# Patient Record
Sex: MALE | Race: WHITE | NOT HISPANIC OR LATINO | Employment: OTHER | URBAN - METROPOLITAN AREA
[De-identification: names, ages, dates, MRNs, and addresses within clinical notes are randomized per-mention and may not be internally consistent; named-entity substitution may affect disease eponyms.]

---

## 2023-04-03 LAB
ALANINE AMINOTRANSFERASE (SGPT) (U/L) IN SER/PLAS: 22 U/L (ref 10–52)
ALBUMIN (G/DL) IN SER/PLAS: 3.8 G/DL (ref 3.4–5)
ALKALINE PHOSPHATASE (U/L) IN SER/PLAS: 84 U/L (ref 33–136)
ANION GAP IN SER/PLAS: 12 MMOL/L (ref 10–20)
ASPARTATE AMINOTRANSFERASE (SGOT) (U/L) IN SER/PLAS: 21 U/L (ref 9–39)
BILIRUBIN TOTAL (MG/DL) IN SER/PLAS: 0.6 MG/DL (ref 0–1.2)
CALCIUM (MG/DL) IN SER/PLAS: 9 MG/DL (ref 8.6–10.3)
CARBON DIOXIDE, TOTAL (MMOL/L) IN SER/PLAS: 29 MMOL/L (ref 21–32)
CHLORIDE (MMOL/L) IN SER/PLAS: 102 MMOL/L (ref 98–107)
CHOLESTEROL (MG/DL) IN SER/PLAS: 99 MG/DL (ref 0–199)
CHOLESTEROL IN HDL (MG/DL) IN SER/PLAS: 40.3 MG/DL
CHOLESTEROL/HDL RATIO: 2.5
CREATININE (MG/DL) IN SER/PLAS: 1.6 MG/DL (ref 0.5–1.3)
ESTIMATED AVERAGE GLUCOSE FOR HBA1C: 194 MG/DL
GFR MALE: 41 ML/MIN/1.73M2
GLUCOSE (MG/DL) IN SER/PLAS: 93 MG/DL (ref 74–99)
HEMOGLOBIN A1C/HEMOGLOBIN TOTAL IN BLOOD: 8.4 %
LDL: 46 MG/DL (ref 0–99)
POTASSIUM (MMOL/L) IN SER/PLAS: 4.1 MMOL/L (ref 3.5–5.3)
PROTEIN TOTAL: 6.7 G/DL (ref 6.4–8.2)
SODIUM (MMOL/L) IN SER/PLAS: 139 MMOL/L (ref 136–145)
THYROTROPIN (MIU/L) IN SER/PLAS BY DETECTION LIMIT <= 0.05 MIU/L: 4.88 MIU/L (ref 0.44–3.98)
THYROXINE (T4) FREE (NG/DL) IN SER/PLAS: 1.33 NG/DL (ref 0.61–1.12)
TRIGLYCERIDE (MG/DL) IN SER/PLAS: 66 MG/DL (ref 0–149)
URATE (MG/DL) IN SER/PLAS: 7.9 MG/DL (ref 4–7.5)
UREA NITROGEN (MG/DL) IN SER/PLAS: 24 MG/DL (ref 6–23)
VLDL: 13 MG/DL (ref 0–40)

## 2023-04-19 ENCOUNTER — OFFICE VISIT (OUTPATIENT)
Dept: PRIMARY CARE | Facility: CLINIC | Age: 88
End: 2023-04-19
Payer: MEDICARE

## 2023-04-19 VITALS
BODY MASS INDEX: 27.79 KG/M2 | WEIGHT: 188.2 LBS | DIASTOLIC BLOOD PRESSURE: 64 MMHG | HEART RATE: 66 BPM | TEMPERATURE: 96.4 F | SYSTOLIC BLOOD PRESSURE: 144 MMHG | OXYGEN SATURATION: 97 %

## 2023-04-19 DIAGNOSIS — E11.9 TYPE 2 DIABETES MELLITUS WITHOUT COMPLICATION, WITHOUT LONG-TERM CURRENT USE OF INSULIN (MULTI): ICD-10-CM

## 2023-04-19 DIAGNOSIS — Z00.00 MEDICARE ANNUAL WELLNESS VISIT, SUBSEQUENT: Primary | ICD-10-CM

## 2023-04-19 DIAGNOSIS — Z13.39 ALCOHOL SCREENING: ICD-10-CM

## 2023-04-19 DIAGNOSIS — I48.11 LONGSTANDING PERSISTENT ATRIAL FIBRILLATION (MULTI): ICD-10-CM

## 2023-04-19 DIAGNOSIS — E03.9 ACQUIRED HYPOTHYROIDISM: ICD-10-CM

## 2023-04-19 DIAGNOSIS — Z13.31 DEPRESSION SCREEN: ICD-10-CM

## 2023-04-19 PROCEDURE — G0444 DEPRESSION SCREEN ANNUAL: HCPCS | Performed by: INTERNAL MEDICINE

## 2023-04-19 PROCEDURE — 1159F MED LIST DOCD IN RCRD: CPT | Performed by: INTERNAL MEDICINE

## 2023-04-19 PROCEDURE — 1170F FXNL STATUS ASSESSED: CPT | Performed by: INTERNAL MEDICINE

## 2023-04-19 PROCEDURE — G0439 PPPS, SUBSEQ VISIT: HCPCS | Performed by: INTERNAL MEDICINE

## 2023-04-19 PROCEDURE — G0442 ANNUAL ALCOHOL SCREEN 15 MIN: HCPCS | Performed by: INTERNAL MEDICINE

## 2023-04-19 PROCEDURE — 99213 OFFICE O/P EST LOW 20 MIN: CPT | Performed by: INTERNAL MEDICINE

## 2023-04-19 RX ORDER — GLIPIZIDE 5 MG/1
TABLET ORAL
COMMUNITY
Start: 2023-03-04

## 2023-04-19 RX ORDER — IBUPROFEN 200 MG
CAPSULE ORAL 2 TIMES DAILY
COMMUNITY
Start: 2016-06-01 | End: 2023-04-19 | Stop reason: SDUPTHER

## 2023-04-19 RX ORDER — WARFARIN 2.5 MG/1
1 TABLET ORAL DAILY
COMMUNITY
Start: 2015-08-27 | End: 2024-02-27 | Stop reason: SDUPTHER

## 2023-04-19 RX ORDER — ATORVASTATIN CALCIUM 10 MG/1
1 TABLET, FILM COATED ORAL NIGHTLY
COMMUNITY
Start: 2018-02-05 | End: 2024-02-27 | Stop reason: SDUPTHER

## 2023-04-19 RX ORDER — PANTOPRAZOLE SODIUM 40 MG/1
1 TABLET, DELAYED RELEASE ORAL DAILY
COMMUNITY
Start: 2022-09-26 | End: 2024-02-27 | Stop reason: SDUPTHER

## 2023-04-19 RX ORDER — KETOCONAZOLE 20 MG/ML
SHAMPOO, SUSPENSION TOPICAL WEEKLY
COMMUNITY

## 2023-04-19 RX ORDER — LEVOTHYROXINE SODIUM 100 UG/1
1 TABLET ORAL DAILY
COMMUNITY
Start: 2015-11-18 | End: 2023-09-08 | Stop reason: SDUPTHER

## 2023-04-19 RX ORDER — FERROUS SULFATE 325(65) MG
1 TABLET ORAL 2 TIMES DAILY
COMMUNITY
Start: 2018-02-05

## 2023-04-19 RX ORDER — PEN NEEDLE, DIABETIC 31 GX5/16"
NEEDLE, DISPOSABLE MISCELLANEOUS
COMMUNITY
Start: 2022-10-12 | End: 2023-10-18 | Stop reason: SDUPTHER

## 2023-04-19 RX ORDER — TORSEMIDE 10 MG/1
1 TABLET ORAL DAILY
COMMUNITY
Start: 2018-02-05 | End: 2024-02-27 | Stop reason: SDUPTHER

## 2023-04-19 RX ORDER — INSULIN DEGLUDEC 100 U/ML
INJECTION, SOLUTION SUBCUTANEOUS
COMMUNITY
Start: 2020-12-02 | End: 2023-04-19 | Stop reason: SDUPTHER

## 2023-04-19 RX ORDER — INSULIN DEGLUDEC 100 U/ML
8 INJECTION, SOLUTION SUBCUTANEOUS EVERY MORNING
Qty: 3 ML | Refills: 3 | Status: SHIPPED | OUTPATIENT
Start: 2023-04-19 | End: 2023-10-18 | Stop reason: SDUPTHER

## 2023-04-19 RX ORDER — IBUPROFEN 200 MG
1 CAPSULE ORAL 2 TIMES DAILY
Qty: 200 STRIP | Refills: 3 | Status: SHIPPED | OUTPATIENT
Start: 2023-04-19 | End: 2023-10-18 | Stop reason: SDUPTHER

## 2023-04-19 ASSESSMENT — ACTIVITIES OF DAILY LIVING (ADL)
DRESSING: INDEPENDENT
TAKING_MEDICATION: INDEPENDENT
GROCERY_SHOPPING: INDEPENDENT
DOING_HOUSEWORK: INDEPENDENT
BATHING: INDEPENDENT
MANAGING_FINANCES: INDEPENDENT

## 2023-04-19 ASSESSMENT — PATIENT HEALTH QUESTIONNAIRE - PHQ9
2. FEELING DOWN, DEPRESSED OR HOPELESS: NOT AT ALL
1. LITTLE INTEREST OR PLEASURE IN DOING THINGS: NOT AT ALL
SUM OF ALL RESPONSES TO PHQ9 QUESTIONS 1 AND 2: 0

## 2023-04-19 NOTE — PATIENT INSTRUCTIONS
Plan:   Medicare wellness done   Recent lab results were discussed with patient. Questions related to it answered. Patient felt satisfied with it.  Current medications are effective. advised to continue current medications.  F/U with cardiologist as advised   F/U 6 months or as needed

## 2023-04-19 NOTE — PROGRESS NOTES
My nurse note reviewed. Patient is here for:  Here for medicare wellness and follow up    Hx of a fib,thyroid, DM    Hx of L knee surgery 8 yrs ago. Uses cane    Patient is independent with ADLs. Patient does drive.     No recent falls.    During Medicare wellness apart from looking at assessment done by nurse,  I also asked following :    Alcohol use : Alcohol screening  was  done during this visit. Patient is not having any issue with increase alcohol use . No ETOH abuse was observed by history .occ wine drinking   HIV test: patient was not found to be high risk for HIV     Cognitive issue : None     Advanced Directive: Patient has advanced directive including living will and Health care power of . Health POA is son      I have reviewed all active medications patient is currently on . Questions related to medication answered to patient's satisfaction.    Patient denies any shortness of breath, PND, orthopnea, chest pain , palpitation, syncope or edema in legs  patient denies any abdominal pain, tenderness, nausea, vomiting, change in bowel habits or blood in stool.  Patient denies any weakness in extremities.. Denies any headache, visual symptoms , speech problems or  tremors . No TIA or stroke like symptoms..    Taking meds ok     OBJECTIVE :  Vitals noted   Not in acute distress  Conj Pink, No icterus  Neck:No Cervical LN enlargement, No Thyroid enlargement No carotid bruit  Lungs: good air entry bilaterally, no rales or rhonchi  CVS: S1 S2 + ,irr irr, rate controlled  no S3. Sys heart murmur heard.   Abdomen: Soft, non tender , BS +. no organomegaly , no CVA tenderness  MSK: No spine tenderness or muscle tenderness noted on gross examination  CNS: Pt is alert, moving all 4 extremities. no motor weakness or abnormal movements noted on gross examination.  Extremities: No edema, No calf tenderness, Francisco's sign negative.      Assessment:  1. Medicare annual wellness visit, subsequent - done   2. Alcohol  screening    3. Depression screen    4. Type 2 diabetes mellitus without complication, without long-term current use of insulin (CMS/[facility])- sees Dr Rushing    5. Longstanding persistent atrial fibrillation (CMS/[facility]) -rate controlled   6. Acquired hypothyroidism -stable on med   7 sys murmur- had echo done. Reviewed.       Plan:   Medicare wellness done  Alcohol screen was  done during this visit. Patient is not having any issue with increase alcohol use . No ETOH abuse was observed by history .  Occ wine drinking    Recent lab results were discussed with patient. Questions related to it answered. Patient felt satisfied with it.  Current medications are effective. advised to continue current medications.  F/U with cardiologist as advised   F/U 6 months or as needed

## 2023-08-24 LAB
ALANINE AMINOTRANSFERASE (SGPT) (U/L) IN SER/PLAS: 29 U/L (ref 10–52)
ALBUMIN (G/DL) IN SER/PLAS: 4 G/DL (ref 3.4–5)
ALBUMIN (MG/L) IN URINE: 21.8 MG/L
ALBUMIN/CREATININE (UG/MG) IN URINE: 66.5 UG/MG CRT (ref 0–30)
ALKALINE PHOSPHATASE (U/L) IN SER/PLAS: 89 U/L (ref 33–136)
ANION GAP IN SER/PLAS: 12 MMOL/L (ref 10–20)
ASPARTATE AMINOTRANSFERASE (SGOT) (U/L) IN SER/PLAS: 29 U/L (ref 9–39)
BASOPHILS (10*3/UL) IN BLOOD BY AUTOMATED COUNT: 0.03 X10E9/L (ref 0–0.1)
BASOPHILS/100 LEUKOCYTES IN BLOOD BY AUTOMATED COUNT: 0.5 % (ref 0–2)
BILIRUBIN TOTAL (MG/DL) IN SER/PLAS: 0.7 MG/DL (ref 0–1.2)
CALCIUM (MG/DL) IN SER/PLAS: 8.7 MG/DL (ref 8.6–10.3)
CARBON DIOXIDE, TOTAL (MMOL/L) IN SER/PLAS: 26 MMOL/L (ref 21–32)
CHLORIDE (MMOL/L) IN SER/PLAS: 103 MMOL/L (ref 98–107)
CREATININE (MG/DL) IN SER/PLAS: 1.44 MG/DL (ref 0.5–1.3)
CREATININE (MG/DL) IN URINE: 32.8 MG/DL (ref 20–370)
EOSINOPHILS (10*3/UL) IN BLOOD BY AUTOMATED COUNT: 0.19 X10E9/L (ref 0–0.4)
EOSINOPHILS/100 LEUKOCYTES IN BLOOD BY AUTOMATED COUNT: 3.4 % (ref 0–6)
ERYTHROCYTE DISTRIBUTION WIDTH (RATIO) BY AUTOMATED COUNT: 14.6 % (ref 11.5–14.5)
ERYTHROCYTE MEAN CORPUSCULAR HEMOGLOBIN CONCENTRATION (G/DL) BY AUTOMATED: 31 G/DL (ref 32–36)
ERYTHROCYTE MEAN CORPUSCULAR VOLUME (FL) BY AUTOMATED COUNT: 98 FL (ref 80–100)
ERYTHROCYTES (10*6/UL) IN BLOOD BY AUTOMATED COUNT: 3.45 X10E12/L (ref 4.5–5.9)
ESTIMATED AVERAGE GLUCOSE FOR HBA1C: 186 MG/DL
GFR MALE: 46 ML/MIN/1.73M2
GLUCOSE (MG/DL) IN SER/PLAS: 201 MG/DL (ref 74–99)
HEMATOCRIT (%) IN BLOOD BY AUTOMATED COUNT: 33.9 % (ref 41–52)
HEMOGLOBIN (G/DL) IN BLOOD: 10.5 G/DL (ref 13.5–17.5)
HEMOGLOBIN A1C/HEMOGLOBIN TOTAL IN BLOOD: 8.1 %
IMMATURE GRANULOCYTES/100 LEUKOCYTES IN BLOOD BY AUTOMATED COUNT: 0.4 % (ref 0–0.9)
LEUKOCYTES (10*3/UL) IN BLOOD BY AUTOMATED COUNT: 5.6 X10E9/L (ref 4.4–11.3)
LYMPHOCYTES (10*3/UL) IN BLOOD BY AUTOMATED COUNT: 0.82 X10E9/L (ref 0.8–3)
LYMPHOCYTES/100 LEUKOCYTES IN BLOOD BY AUTOMATED COUNT: 14.6 % (ref 13–44)
MONOCYTES (10*3/UL) IN BLOOD BY AUTOMATED COUNT: 0.52 X10E9/L (ref 0.05–0.8)
MONOCYTES/100 LEUKOCYTES IN BLOOD BY AUTOMATED COUNT: 9.2 % (ref 2–10)
NEUTROPHILS (10*3/UL) IN BLOOD BY AUTOMATED COUNT: 4.05 X10E9/L (ref 1.6–5.5)
NEUTROPHILS/100 LEUKOCYTES IN BLOOD BY AUTOMATED COUNT: 71.9 % (ref 40–80)
NRBC (PER 100 WBCS) BY AUTOMATED COUNT: 0 /100 WBC (ref 0–0)
PARATHYRIN INTACT (PG/ML) IN SER/PLAS: 107.6 PG/ML (ref 18.5–88)
PHOSPHATE (MG/DL) IN SER/PLAS: 3.4 MG/DL (ref 2.5–4.9)
PLATELETS (10*3/UL) IN BLOOD AUTOMATED COUNT: 171 X10E9/L (ref 150–450)
POTASSIUM (MMOL/L) IN SER/PLAS: 4.9 MMOL/L (ref 3.5–5.3)
PROTEIN TOTAL: 6.8 G/DL (ref 6.4–8.2)
SODIUM (MMOL/L) IN SER/PLAS: 136 MMOL/L (ref 136–145)
THYROTROPIN (MIU/L) IN SER/PLAS BY DETECTION LIMIT <= 0.05 MIU/L: 3.86 MIU/L (ref 0.44–3.98)
UREA NITROGEN (MG/DL) IN SER/PLAS: 24 MG/DL (ref 6–23)

## 2023-09-08 DIAGNOSIS — E03.9 ACQUIRED HYPOTHYROIDISM: Primary | ICD-10-CM

## 2023-09-08 RX ORDER — LEVOTHYROXINE SODIUM 100 UG/1
100 TABLET ORAL DAILY
Qty: 90 TABLET | Refills: 3 | Status: SHIPPED | OUTPATIENT
Start: 2023-09-08 | End: 2024-04-22 | Stop reason: SDUPTHER

## 2023-10-12 ENCOUNTER — ANTICOAGULATION - WARFARIN VISIT (OUTPATIENT)
Dept: PHARMACY | Facility: CLINIC | Age: 88
End: 2023-10-12
Payer: MEDICARE

## 2023-10-12 DIAGNOSIS — I48.91 ATRIAL FIBRILLATION, UNSPECIFIED TYPE (MULTI): Primary | ICD-10-CM

## 2023-10-12 PROBLEM — K80.20 ASYMPTOMATIC CHOLELITHIASIS: Status: ACTIVE | Noted: 2023-10-12

## 2023-10-12 PROBLEM — M25.551 RIGHT HIP PAIN: Status: ACTIVE | Noted: 2023-10-12

## 2023-10-12 PROBLEM — M79.10 MUSCULAR PAIN: Status: ACTIVE | Noted: 2023-10-12

## 2023-10-12 PROBLEM — E03.9 ACQUIRED HYPOTHYROIDISM: Status: ACTIVE | Noted: 2023-10-12

## 2023-10-12 PROBLEM — E66.9 OBESITY, CLASS I, BMI 30.0-34.9 (SEE ACTUAL BMI): Status: ACTIVE | Noted: 2023-10-12

## 2023-10-12 PROBLEM — R10.9 ABDOMINAL PAIN: Status: ACTIVE | Noted: 2023-10-12

## 2023-10-12 PROBLEM — K44.9 HH (HIATUS HERNIA): Status: ACTIVE | Noted: 2023-10-12

## 2023-10-12 PROBLEM — K25.9 GASTRIC ULCER: Status: ACTIVE | Noted: 2023-10-12

## 2023-10-12 PROBLEM — Z86.19 PERSONAL HISTORY OF HELICOBACTER INFECTION: Status: ACTIVE | Noted: 2023-10-12

## 2023-10-12 PROBLEM — Z96.659 PRESENCE OF ARTIFICIAL KNEE JOINT: Status: ACTIVE | Noted: 2023-10-12

## 2023-10-12 PROBLEM — K22.70 BARRETT'S ESOPHAGUS: Status: ACTIVE | Noted: 2023-10-12

## 2023-10-12 PROBLEM — R93.89 ABNORMAL CHEST XRAY: Status: ACTIVE | Noted: 2023-10-12

## 2023-10-12 PROBLEM — D50.9 IRON DEFICIENCY ANEMIA: Status: ACTIVE | Noted: 2023-10-12

## 2023-10-12 PROBLEM — R14.0 ABDOMINAL BLOATING: Status: ACTIVE | Noted: 2023-10-12

## 2023-10-12 PROBLEM — J18.9 PNEUMONIA: Status: ACTIVE | Noted: 2023-10-12

## 2023-10-12 PROBLEM — D36.9 TUBULAR ADENOMA: Status: ACTIVE | Noted: 2023-10-12

## 2023-10-12 PROBLEM — Z86.010 HISTORY OF COLON POLYPS: Status: ACTIVE | Noted: 2023-10-12

## 2023-10-12 PROBLEM — Z86.0100 HISTORY OF COLON POLYPS: Status: ACTIVE | Noted: 2023-10-12

## 2023-10-12 PROBLEM — E78.5 HYPERLIPIDEMIA: Status: ACTIVE | Noted: 2023-10-12

## 2023-10-12 PROBLEM — A04.8 HELICOBACTER PYLORI (H. PYLORI) INFECTION: Status: ACTIVE | Noted: 2023-10-12

## 2023-10-12 PROBLEM — I35.0 MODERATE AORTIC STENOSIS: Status: ACTIVE | Noted: 2023-10-12

## 2023-10-12 PROBLEM — M25.50 LEG JOINT PAIN: Status: ACTIVE | Noted: 2023-10-12

## 2023-10-12 PROBLEM — E11.9 DIABETES MELLITUS (MULTI): Status: ACTIVE | Noted: 2023-10-12

## 2023-10-12 PROBLEM — E66.811 OBESITY, CLASS I, BMI 30.0-34.9 (SEE ACTUAL BMI): Status: ACTIVE | Noted: 2023-10-12

## 2023-10-12 PROBLEM — K58.9 IRRITABLE BOWEL SYNDROME: Status: ACTIVE | Noted: 2023-10-12

## 2023-10-12 LAB
POC INR: 2.2
POC PROTHROMBIN TIME: NORMAL

## 2023-10-12 PROCEDURE — 99212 OFFICE O/P EST SF 10 MIN: CPT | Performed by: PHARMACIST

## 2023-10-12 PROCEDURE — 85610 PROTHROMBIN TIME: CPT | Performed by: PHARMACIST

## 2023-10-12 RX ORDER — GLYBURIDE 5 MG/1
2 TABLET ORAL 2 TIMES DAILY
COMMUNITY
Start: 2015-06-26

## 2023-10-12 RX ORDER — PSYLLIUM HUSK 3.4 G/5.8G
1 POWDER ORAL 2 TIMES DAILY
COMMUNITY
Start: 2020-10-05

## 2023-10-12 RX ORDER — SIMETHICONE 125 MG
125 CAPSULE ORAL EVERY 12 HOURS PRN
COMMUNITY
Start: 2020-10-05

## 2023-10-12 RX ORDER — PIOGLITAZONEHYDROCHLORIDE 45 MG/1
45 TABLET ORAL EVERY MORNING
COMMUNITY

## 2023-10-12 RX ORDER — BRIMONIDINE TARTRATE, TIMOLOL MALEATE 2; 5 MG/ML; MG/ML
1 SOLUTION/ DROPS OPHTHALMIC EVERY 12 HOURS
COMMUNITY

## 2023-10-12 RX ORDER — METRONIDAZOLE 7.5 MG/G
LOTION TOPICAL 2 TIMES DAILY
COMMUNITY

## 2023-10-12 RX ORDER — OXYMETAZOLINE HYDROCHLORIDE 0.05 G/100ML
2 SPRAY NASAL EVERY 12 HOURS PRN
COMMUNITY
Start: 2022-06-06

## 2023-10-12 NOTE — PROGRESS NOTES
Coumadin Clinic Visit Note    Patient verified warfarin dose  No missed doses  No unusual bruising or bleeding  No changes to medications  Consistent dietary green intake  No anticipated procedures at this time  INR Therapeutic today at 2.2  No changes to warfarin dose today  Next appointment 5 weeks      Paolo MaresD

## 2023-10-18 ENCOUNTER — OFFICE VISIT (OUTPATIENT)
Dept: PRIMARY CARE | Facility: CLINIC | Age: 88
End: 2023-10-18
Payer: MEDICARE

## 2023-10-18 VITALS
WEIGHT: 183.6 LBS | BODY MASS INDEX: 27.11 KG/M2 | HEART RATE: 49 BPM | DIASTOLIC BLOOD PRESSURE: 80 MMHG | SYSTOLIC BLOOD PRESSURE: 124 MMHG | TEMPERATURE: 98 F | OXYGEN SATURATION: 98 %

## 2023-10-18 DIAGNOSIS — N18.4 STAGE 4 CHRONIC KIDNEY DISEASE (MULTI): ICD-10-CM

## 2023-10-18 DIAGNOSIS — E03.9 ACQUIRED HYPOTHYROIDISM: ICD-10-CM

## 2023-10-18 DIAGNOSIS — E11.9 TYPE 2 DIABETES MELLITUS WITHOUT COMPLICATION, WITHOUT LONG-TERM CURRENT USE OF INSULIN (MULTI): Primary | ICD-10-CM

## 2023-10-18 PROCEDURE — 1159F MED LIST DOCD IN RCRD: CPT | Performed by: INTERNAL MEDICINE

## 2023-10-18 PROCEDURE — 99214 OFFICE O/P EST MOD 30 MIN: CPT | Performed by: INTERNAL MEDICINE

## 2023-10-18 PROCEDURE — 1126F AMNT PAIN NOTED NONE PRSNT: CPT | Performed by: INTERNAL MEDICINE

## 2023-10-18 RX ORDER — INSULIN DEGLUDEC 100 U/ML
8 INJECTION, SOLUTION SUBCUTANEOUS EVERY MORNING
Qty: 3 ML | Refills: 3 | Status: SHIPPED | OUTPATIENT
Start: 2023-10-18

## 2023-10-18 RX ORDER — IBUPROFEN 200 MG
1 CAPSULE ORAL 2 TIMES DAILY
Qty: 200 STRIP | Refills: 3 | Status: SHIPPED | OUTPATIENT
Start: 2023-10-18

## 2023-10-18 RX ORDER — PEN NEEDLE, DIABETIC 30 GX3/16"
NEEDLE, DISPOSABLE MISCELLANEOUS
Qty: 100 EACH | Refills: 3 | Status: SHIPPED | OUTPATIENT
Start: 2023-10-18

## 2023-10-18 RX ORDER — INSULIN DEGLUDEC 100 U/ML
8 INJECTION, SOLUTION SUBCUTANEOUS EVERY MORNING
Qty: 3 ML | Refills: 3 | Status: SHIPPED | OUTPATIENT
Start: 2023-10-18 | End: 2023-10-18 | Stop reason: SDUPTHER

## 2023-10-18 RX ORDER — IBUPROFEN 200 MG
1 CAPSULE ORAL 2 TIMES DAILY
Qty: 200 STRIP | Refills: 3 | Status: SHIPPED | OUTPATIENT
Start: 2023-10-18 | End: 2023-10-18 | Stop reason: SDUPTHER

## 2023-10-18 RX ORDER — PEN NEEDLE, DIABETIC 31 GX5/16"
NEEDLE, DISPOSABLE MISCELLANEOUS
Qty: 100 EACH | Refills: 3 | Status: SHIPPED | OUTPATIENT
Start: 2023-10-18 | End: 2023-10-18 | Stop reason: SDUPTHER

## 2023-10-18 ASSESSMENT — PATIENT HEALTH QUESTIONNAIRE - PHQ9
SUM OF ALL RESPONSES TO PHQ9 QUESTIONS 1 AND 2: 0
1. LITTLE INTEREST OR PLEASURE IN DOING THINGS: NOT AT ALL
2. FEELING DOWN, DEPRESSED OR HOPELESS: NOT AT ALL

## 2023-10-18 NOTE — PROGRESS NOTES
My nurse note reviewed. Patient is here for:  Follow-up (6 mo follow up/Needs refills on needles, tresiba, and test strips - would like them printed out)       Here for f/U on DM , thyrod, a fib    Patient denies any shortness of breath, PND, orthopnea, chest pain , palpitation, syncope or edema in legs  Taking meds ok  Accuchecks 110-170 range most of the time , check it twice daily   Non smoker for 65 yrs   Gets echo done by cardiologist, results from 2/2023 reviewed  Lives alone  Patient is independent with ADLs. Patient does drive.   walks with cane's assistance.   No recent falls.    OBJECTIVE :  /80   Pulse (!) 49   Temp 36.7 °C (98 °F) (Temporal)   Wt 83.3 kg (183 lb 9.6 oz)   SpO2 98%   BMI 27.11 kg/m²   Repeat bp is ok  CVS: S1 S2 + , no S3. Sys heart murmur appreciated. Lungs clear, No edema  [place]   Alert, oriented     Assessment:  1. Type 2 diabetes mellitus without complication, without long-term current use of insulin (CMS/[facility])  Doing well. Continue med. refill      2. Stage 4 chronic kidney disease (CMS/HCC)  Hx of.       3. Acquired hypothyroidism  Hx of. On med.         Plan  Current medications are effective. advised to continue current medications.  Monitor sugar at home   Requested prescription/s refill done to the pharmacy of patient choice .  Declines flu shot   F/U in April as scheduled

## 2023-10-18 NOTE — PATIENT INSTRUCTIONS
Plan  Current medications are effective. advised to continue current medications.  Monitor sugar at home   Requested prescription/s refill done to the pharmacy of patient choice .  Declines flu shot   F/U in April as scheduled

## 2023-11-16 ENCOUNTER — ANTICOAGULATION - WARFARIN VISIT (OUTPATIENT)
Dept: PHARMACY | Facility: CLINIC | Age: 88
End: 2023-11-16
Payer: MEDICARE

## 2023-11-16 DIAGNOSIS — I48.91 ATRIAL FIBRILLATION, UNSPECIFIED TYPE (MULTI): Primary | ICD-10-CM

## 2023-11-16 LAB
POC INR: 2.3
POC PROTHROMBIN TIME: NORMAL

## 2023-11-16 PROCEDURE — 99212 OFFICE O/P EST SF 10 MIN: CPT | Performed by: PHARMACIST

## 2023-11-16 PROCEDURE — 85610 PROTHROMBIN TIME: CPT | Performed by: PHARMACIST

## 2023-11-16 NOTE — PROGRESS NOTES
Verified current dose with pt.    No new meds or med changes since last visit.  Pt denies unusual bleed/bruise.  No upcoming procedures.  Inr = 2.3  Continue same dose and check again in 5 weeks.    Pt had skin cancer removed and held 1 & 1/2 doses without letting us know.  He will have another skin cancer removal on his chest area and he does not need to hold warfarin again.

## 2023-12-13 ENCOUNTER — LAB (OUTPATIENT)
Dept: LAB | Facility: LAB | Age: 88
End: 2023-12-13
Payer: MEDICARE

## 2023-12-13 DIAGNOSIS — E11.9 TYPE 2 DIABETES MELLITUS WITHOUT COMPLICATIONS (MULTI): ICD-10-CM

## 2023-12-13 DIAGNOSIS — E78.5 HYPERLIPIDEMIA, UNSPECIFIED: Primary | ICD-10-CM

## 2023-12-13 LAB
ALBUMIN SERPL BCP-MCNC: 4 G/DL (ref 3.4–5)
ALP SERPL-CCNC: 89 U/L (ref 33–136)
ALT SERPL W P-5'-P-CCNC: 26 U/L (ref 10–52)
ANION GAP SERPL CALC-SCNC: 11 MMOL/L (ref 10–20)
AST SERPL W P-5'-P-CCNC: 25 U/L (ref 9–39)
BILIRUB SERPL-MCNC: 0.6 MG/DL (ref 0–1.2)
BUN SERPL-MCNC: 28 MG/DL (ref 6–23)
CALCIUM SERPL-MCNC: 8.7 MG/DL (ref 8.6–10.3)
CHLORIDE SERPL-SCNC: 104 MMOL/L (ref 98–107)
CO2 SERPL-SCNC: 29 MMOL/L (ref 21–32)
CREAT SERPL-MCNC: 1.55 MG/DL (ref 0.5–1.3)
GFR SERPL CREATININE-BSD FRML MDRD: 42 ML/MIN/1.73M*2
GLUCOSE SERPL-MCNC: 184 MG/DL (ref 74–99)
POTASSIUM SERPL-SCNC: 4.6 MMOL/L (ref 3.5–5.3)
PROT SERPL-MCNC: 6.7 G/DL (ref 6.4–8.2)
SODIUM SERPL-SCNC: 139 MMOL/L (ref 136–145)

## 2023-12-13 PROCEDURE — 36415 COLL VENOUS BLD VENIPUNCTURE: CPT

## 2023-12-13 PROCEDURE — 80053 COMPREHEN METABOLIC PANEL: CPT

## 2023-12-13 PROCEDURE — 83036 HEMOGLOBIN GLYCOSYLATED A1C: CPT

## 2023-12-14 LAB
EST. AVERAGE GLUCOSE BLD GHB EST-MCNC: 186 MG/DL
HBA1C MFR BLD: 8.1 %

## 2023-12-21 ENCOUNTER — ANTICOAGULATION - WARFARIN VISIT (OUTPATIENT)
Dept: PHARMACY | Facility: CLINIC | Age: 88
End: 2023-12-21
Payer: MEDICARE

## 2023-12-21 DIAGNOSIS — I48.91 ATRIAL FIBRILLATION, UNSPECIFIED TYPE (MULTI): Primary | ICD-10-CM

## 2023-12-21 LAB
POC INR: 2.4
POC PROTHROMBIN TIME: NORMAL

## 2023-12-21 PROCEDURE — 85610 PROTHROMBIN TIME: CPT

## 2023-12-21 PROCEDURE — 99212 OFFICE O/P EST SF 10 MIN: CPT

## 2024-01-25 ENCOUNTER — CLINICAL SUPPORT (OUTPATIENT)
Dept: PHARMACY | Facility: CLINIC | Age: 89
End: 2024-01-25
Payer: MEDICARE

## 2024-01-25 ENCOUNTER — ANTICOAGULATION - WARFARIN VISIT (OUTPATIENT)
Dept: PHARMACY | Facility: CLINIC | Age: 89
End: 2024-01-25
Payer: MEDICARE

## 2024-01-25 DIAGNOSIS — I48.91 ATRIAL FIBRILLATION, UNSPECIFIED TYPE (MULTI): Primary | ICD-10-CM

## 2024-01-25 LAB
POC INR: 2.8
POC PROTHROMBIN TIME: NORMAL

## 2024-01-25 PROCEDURE — 85610 PROTHROMBIN TIME: CPT | Mod: QW | Performed by: PHARMACIST

## 2024-01-25 PROCEDURE — 99212 OFFICE O/P EST SF 10 MIN: CPT | Performed by: PHARMACIST

## 2024-01-25 NOTE — PROGRESS NOTES
Saul Asif is a 89 y.o. male with history of A fib who presents today for anticoagulation monitoring and adjustment.  INR 2.8 is therapeutic for this patient (goal range 2-3) and is reflective of 11.25 mg TWD  Patient verifies current dosing regimen, patient able to verbally recall dose  Patient reports no  missed doses since last INR  Last INR 2.4 on 12/21/24 (5 week interval)  Patient denies s/sx clotting and/or stroke  Patient denies hematuria, epistaxis, rectal bleeding  Patient denies changes in diet, alcohol, or tobacco use  Vegetable intake consistent from week to week  Reviewed medication list and drug allergies with patient, updated any medication additions or modifications accordingly  Acetaminophen intake: no changes   Patient also denies any pending medical or dental procedures scheduled at this time  Patient was instructed to continue current TWD 11.25mg and RTC 5 weeks    Annie Merritt, PharmD, BCPS   1/25/2024 9:22 AM

## 2024-02-02 ENCOUNTER — TELEPHONE (OUTPATIENT)
Dept: PRIMARY CARE | Facility: CLINIC | Age: 89
End: 2024-02-02
Payer: MEDICARE

## 2024-02-06 DIAGNOSIS — N18.4 STAGE 4 CHRONIC KIDNEY DISEASE (MULTI): ICD-10-CM

## 2024-02-06 DIAGNOSIS — R26.9 GAIT DISORDER: Primary | ICD-10-CM

## 2024-02-15 DIAGNOSIS — I48.0 PAROXYSMAL ATRIAL FIBRILLATION (MULTI): Primary | ICD-10-CM

## 2024-02-22 PROBLEM — C61 PROSTATE CANCER (MULTI): Status: ACTIVE | Noted: 2017-11-09

## 2024-02-22 PROBLEM — C44.329 SQUAMOUS CELL CANCER OF SKIN OF LEFT CHEEK: Status: ACTIVE | Noted: 2017-11-13

## 2024-02-27 ENCOUNTER — OFFICE VISIT (OUTPATIENT)
Dept: CARDIOLOGY | Facility: CLINIC | Age: 89
End: 2024-02-27
Payer: MEDICARE

## 2024-02-27 ENCOUNTER — HOSPITAL ENCOUNTER (OUTPATIENT)
Dept: CARDIOLOGY | Facility: CLINIC | Age: 89
Discharge: HOME | End: 2024-02-27
Payer: MEDICARE

## 2024-02-27 VITALS
BODY MASS INDEX: 25.77 KG/M2 | WEIGHT: 174 LBS | SYSTOLIC BLOOD PRESSURE: 112 MMHG | OXYGEN SATURATION: 97 % | DIASTOLIC BLOOD PRESSURE: 72 MMHG | HEART RATE: 57 BPM | HEIGHT: 69 IN

## 2024-02-27 VITALS
DIASTOLIC BLOOD PRESSURE: 80 MMHG | HEIGHT: 69 IN | BODY MASS INDEX: 26.96 KG/M2 | WEIGHT: 182 LBS | SYSTOLIC BLOOD PRESSURE: 124 MMHG

## 2024-02-27 DIAGNOSIS — E78.5 HYPERLIPIDEMIA, UNSPECIFIED HYPERLIPIDEMIA TYPE: ICD-10-CM

## 2024-02-27 DIAGNOSIS — I35.0 SEVERE AORTIC STENOSIS: Primary | ICD-10-CM

## 2024-02-27 DIAGNOSIS — K80.20 ASYMPTOMATIC CHOLELITHIASIS: ICD-10-CM

## 2024-02-27 DIAGNOSIS — I48.0 PAROXYSMAL ATRIAL FIBRILLATION (MULTI): ICD-10-CM

## 2024-02-27 DIAGNOSIS — I48.91 ATRIAL FIBRILLATION, UNSPECIFIED TYPE (MULTI): ICD-10-CM

## 2024-02-27 DIAGNOSIS — I06.2 RHEUMATIC AORTIC STENOSIS WITH INSUFFICIENCY: ICD-10-CM

## 2024-02-27 DIAGNOSIS — I35.0 NONRHEUMATIC AORTIC (VALVE) STENOSIS: ICD-10-CM

## 2024-02-27 DIAGNOSIS — Z96.659 ARTIFICIAL KNEE JOINT PRESENT, UNSPECIFIED LATERALITY: ICD-10-CM

## 2024-02-27 DIAGNOSIS — C61 PROSTATE CANCER (MULTI): ICD-10-CM

## 2024-02-27 DIAGNOSIS — I48.91 UNSPECIFIED ATRIAL FIBRILLATION (MULTI): ICD-10-CM

## 2024-02-27 LAB
AORTIC VALVE MEAN GRADIENT: 60.3 MMHG
AORTIC VALVE PEAK VELOCITY: 5.43 M/S
AV PEAK GRADIENT: 118 MMHG
AVA (PEAK VEL): 0.67 CM2
AVA (VTI): 0.7 CM2
BODY SURFACE AREA: 1.96 M2
EJECTION FRACTION APICAL 4 CHAMBER: 55.9
EJECTION FRACTION: 46 %
LEFT ATRIUM VOLUME AREA LENGTH INDEX BSA: 76.6 ML/M2
LEFT VENTRICLE INTERNAL DIMENSION DIASTOLE: 4.56 CM (ref 3.5–6)
LEFT VENTRICULAR OUTFLOW TRACT DIAMETER: 2.34 CM
RIGHT VENTRICLE FREE WALL PEAK S': 1 CM/S
RIGHT VENTRICLE PEAK SYSTOLIC PRESSURE: 40.2 MMHG
TRICUSPID ANNULAR PLANE SYSTOLIC EXCURSION: 2.1 CM

## 2024-02-27 PROCEDURE — 1159F MED LIST DOCD IN RCRD: CPT | Performed by: INTERNAL MEDICINE

## 2024-02-27 PROCEDURE — 93306 TTE W/DOPPLER COMPLETE: CPT

## 2024-02-27 PROCEDURE — 99215 OFFICE O/P EST HI 40 MIN: CPT | Performed by: INTERNAL MEDICINE

## 2024-02-27 PROCEDURE — 93000 ELECTROCARDIOGRAM COMPLETE: CPT | Performed by: INTERNAL MEDICINE

## 2024-02-27 PROCEDURE — 1126F AMNT PAIN NOTED NONE PRSNT: CPT | Performed by: INTERNAL MEDICINE

## 2024-02-27 PROCEDURE — 1160F RVW MEDS BY RX/DR IN RCRD: CPT | Performed by: INTERNAL MEDICINE

## 2024-02-27 PROCEDURE — 1036F TOBACCO NON-USER: CPT | Performed by: INTERNAL MEDICINE

## 2024-02-27 PROCEDURE — 93306 TTE W/DOPPLER COMPLETE: CPT | Performed by: INTERNAL MEDICINE

## 2024-02-27 RX ORDER — ATORVASTATIN CALCIUM 10 MG/1
10 TABLET, FILM COATED ORAL NIGHTLY
Qty: 90 TABLET | Refills: 3 | Status: SHIPPED | OUTPATIENT
Start: 2024-02-27 | End: 2024-04-23 | Stop reason: SDUPTHER

## 2024-02-27 RX ORDER — WARFARIN 2.5 MG/1
2.5 TABLET ORAL NIGHTLY
Qty: 90 TABLET | Refills: 3 | Status: SHIPPED | OUTPATIENT
Start: 2024-02-27 | End: 2024-04-23 | Stop reason: SDUPTHER

## 2024-02-27 RX ORDER — PANTOPRAZOLE SODIUM 40 MG/1
40 TABLET, DELAYED RELEASE ORAL DAILY
Qty: 90 TABLET | Refills: 3 | Status: SHIPPED | OUTPATIENT
Start: 2024-02-27 | End: 2024-04-15 | Stop reason: SDUPTHER

## 2024-02-27 RX ORDER — TORSEMIDE 10 MG/1
10 TABLET ORAL DAILY
Qty: 90 TABLET | Refills: 3 | Status: SHIPPED | OUTPATIENT
Start: 2024-02-27

## 2024-02-27 NOTE — PROGRESS NOTES
"  Subjective  Saul Asif  is a 89 y.o. year old male who presents for aortic stenosis and atrial fibrillation.  No dyspnea, no chest pain, no edema. No palapitions.  He notes loss of balance butno syncope.  He notes a \"delay from his head to his leg\" but no lightheadedness  Blood pressure 112/72, pulse 57, height 1.753 m (5' 9\"), weight 78.9 kg (174 lb), SpO2 97 %.   Patient has no known allergies.  History reviewed. No pertinent past medical history.  History reviewed. No pertinent surgical history.  Family History   Problem Relation Name Age of Onset    No Known Problems Mother      No Known Problems Father       @SOC    Current Outpatient Medications   Medication Sig Dispense Refill    atorvastatin (Lipitor) 10 mg tablet Take 1 tablet (10 mg) by mouth once daily at bedtime. 90 tablet 3    blood sugar diagnostic (Blood Glucose Test) strip 1 strip by abdominal subcutaneous route 2 times a day. twice a day. 200 strip 3    Combigan 0.2-0.5 % ophthalmic solution Administer 1 drop into both eyes every 12 hours.      ferrous sulfate 325 (65 Fe) MG tablet Take 1 tablet (325 mg) by mouth 2 times a day.      glipiZIDE (Glucotrol) 5 mg tablet       glyBURIDE (Diabeta) 5 mg tablet Take 2 tablets (10 mg) by mouth 2 times a day. In the morning and in the evening      insulin degludec (Tresiba FlexTouch U-100) 100 unit/mL (3 mL) injection Inject 8 Units under the skin once daily in the morning. 3 mL 3    ketoconazole (NIZOral) 2 % shampoo Apply topically per week.      levothyroxine (Synthroid, Levoxyl) 100 mcg tablet Take 1 tablet (100 mcg) by mouth once daily. 90 tablet 3    metroNIDAZOLE (Metrolotion) 0.75 % lotion lotion Apply topically twice a day. to affected area      oxymetazoline (Afrin, oxymetazoline,) 0.05 % nasal spray Administer 2 sprays into each nostril every 12 hours if needed (nose bleed).      pantoprazole (ProtoNix) 40 mg EC tablet Take 1 tablet (40 mg) by mouth once daily. 90 tablet 3    pen needle, " "diabetic (BD Ultra-Fine Mini Pen Needle) 31 gauge x 3/16\" needle TEST BLOOD SUGAR TWICE A  each 3    pioglitazone (Actos) 45 mg tablet Take 1 tablet (45 mg) by mouth once daily in the morning.      psyllium husk, aspartame, (Metamucil Sugar-Free, aspart,) 3.4 gram/5.8 gram powder Take 1 packet by mouth twice a day.      simethicone (Gas-X Extra Strength) 125 mg capsule Take 1 capsule (125 mg) by mouth every 12 hours if needed (abd cramps and bloating).      SITagliptin phosphate (Januvia) 50 mg tablet Take 1 tablet (50 mg) by mouth once daily in the evening.      torsemide (Demadex) 10 mg tablet Take 1 tablet (10 mg) by mouth once daily. 90 tablet 3    warfarin (Coumadin) 2.5 mg tablet Take 1 tablet (2.5 mg) by mouth once daily at bedtime. 90 tablet 3     No current facility-administered medications for this visit.        ROS  Review of Systems   All other systems reviewed and are negative.      Physical Exam  Physical Exam  Constitutional:       Appearance: Normal appearance.   HENT:      Head: Normocephalic and atraumatic.   Eyes:      Extraocular Movements: Extraocular movements intact.      Pupils: Pupils are equal, round, and reactive to light.   Cardiovascular:      Rate and Rhythm: Rhythm irregularly irregular.      Heart sounds: Murmur heard.      Decrescendo systolic murmur is present with a grade of 4/6.   Musculoskeletal:      Right lower leg: No edema.      Left lower leg: No edema.   Skin:     General: Skin is warm and dry.   Neurological:      General: No focal deficit present.      Mental Status: He is alert and oriented to person, place, and time.   Psychiatric:         Mood and Affect: Mood normal.         Behavior: Behavior normal.          EKG  Encounter Date: 02/27/24   ECG 12 Lead    Narrative    Atrial fibrillation at 57/min., RBBB       Problem List Items Addressed This Visit       Asymptomatic cholelithiasis    Relevant Medications    pantoprazole (ProtoNix) 40 mg EC tablet    Atrial " fibrillation (CMS/HCC)    Relevant Medications    torsemide (Demadex) 10 mg tablet    warfarin (Coumadin) 2.5 mg tablet    Other Relevant Orders    Follow Up In Cardiology    ECG 12 Lead (Completed)    Transthoracic Echo (TTE) Complete    Hyperlipidemia    Relevant Medications    atorvastatin (Lipitor) 10 mg tablet    Severe aortic stenosis - Primary     2/27/24 echocardiogram LVEF =45-50&, Severe aoritiuc stenosis, moderate aortic insufficiency, modrate MR, TR with mild to moderately increased RVSP (Reviewed today)         Relevant Orders    Transthoracic Echo (TTE) Complete    Follow Up In Cardiology    Presence of artificial knee joint    Paroxysmal atrial fibrillation (CMS/HCC)    Relevant Orders    Follow Up In Cardiology    Prostate cancer (CMS/HCC)         Same meds with repeat echocardiogram 6 months      Matthew Tubbs MD

## 2024-02-28 ENCOUNTER — LAB (OUTPATIENT)
Dept: LAB | Facility: LAB | Age: 89
End: 2024-02-28
Payer: MEDICARE

## 2024-02-28 DIAGNOSIS — N25.81 SECONDARY HYPERPARATHYROIDISM OF RENAL ORIGIN (MULTI): ICD-10-CM

## 2024-02-28 DIAGNOSIS — N18.30 CHRONIC KIDNEY DISEASE, STAGE 3 UNSPECIFIED (MULTI): Primary | ICD-10-CM

## 2024-02-28 LAB
25(OH)D3 SERPL-MCNC: 33 NG/ML (ref 30–100)
ALBUMIN SERPL BCP-MCNC: 3.9 G/DL (ref 3.4–5)
ANION GAP SERPL CALC-SCNC: 12 MMOL/L (ref 10–20)
BASOPHILS # BLD AUTO: 0.05 X10*3/UL (ref 0–0.1)
BASOPHILS NFR BLD AUTO: 0.9 %
BUN SERPL-MCNC: 29 MG/DL (ref 6–23)
CALCIUM SERPL-MCNC: 8.7 MG/DL (ref 8.6–10.3)
CHLORIDE SERPL-SCNC: 104 MMOL/L (ref 98–107)
CO2 SERPL-SCNC: 28 MMOL/L (ref 21–32)
CREAT SERPL-MCNC: 1.64 MG/DL (ref 0.5–1.3)
CREAT UR-MCNC: 100.1 MG/DL (ref 20–370)
EGFRCR SERPLBLD CKD-EPI 2021: 39 ML/MIN/1.73M*2
EOSINOPHIL # BLD AUTO: 0.08 X10*3/UL (ref 0–0.4)
EOSINOPHIL NFR BLD AUTO: 1.5 %
ERYTHROCYTE [DISTWIDTH] IN BLOOD BY AUTOMATED COUNT: 13.6 % (ref 11.5–14.5)
GLUCOSE SERPL-MCNC: 182 MG/DL (ref 74–99)
HCT VFR BLD AUTO: 35.1 % (ref 41–52)
HGB BLD-MCNC: 11.3 G/DL (ref 13.5–17.5)
IMM GRANULOCYTES # BLD AUTO: 0.01 X10*3/UL (ref 0–0.5)
IMM GRANULOCYTES NFR BLD AUTO: 0.2 % (ref 0–0.9)
LYMPHOCYTES # BLD AUTO: 0.99 X10*3/UL (ref 0.8–3)
LYMPHOCYTES NFR BLD AUTO: 18.5 %
MCH RBC QN AUTO: 31.6 PG (ref 26–34)
MCHC RBC AUTO-ENTMCNC: 32.2 G/DL (ref 32–36)
MCV RBC AUTO: 98 FL (ref 80–100)
MICROALBUMIN UR-MCNC: 11 MG/L
MICROALBUMIN/CREAT UR: 11 UG/MG CREAT
MONOCYTES # BLD AUTO: 0.53 X10*3/UL (ref 0.05–0.8)
MONOCYTES NFR BLD AUTO: 9.9 %
NEUTROPHILS # BLD AUTO: 3.7 X10*3/UL (ref 1.6–5.5)
NEUTROPHILS NFR BLD AUTO: 69 %
NRBC BLD-RTO: 0 /100 WBCS (ref 0–0)
PHOSPHATE SERPL-MCNC: 3.2 MG/DL (ref 2.5–4.9)
PLATELET # BLD AUTO: 155 X10*3/UL (ref 150–450)
POTASSIUM SERPL-SCNC: 4.5 MMOL/L (ref 3.5–5.3)
RBC # BLD AUTO: 3.58 X10*6/UL (ref 4.5–5.9)
SODIUM SERPL-SCNC: 139 MMOL/L (ref 136–145)
WBC # BLD AUTO: 5.4 X10*3/UL (ref 4.4–11.3)

## 2024-02-28 PROCEDURE — 83970 ASSAY OF PARATHORMONE: CPT

## 2024-02-28 PROCEDURE — 85025 COMPLETE CBC W/AUTO DIFF WBC: CPT

## 2024-02-28 PROCEDURE — 82570 ASSAY OF URINE CREATININE: CPT

## 2024-02-28 PROCEDURE — 36415 COLL VENOUS BLD VENIPUNCTURE: CPT

## 2024-02-28 PROCEDURE — 80069 RENAL FUNCTION PANEL: CPT

## 2024-02-28 PROCEDURE — 82043 UR ALBUMIN QUANTITATIVE: CPT

## 2024-02-28 PROCEDURE — 82306 VITAMIN D 25 HYDROXY: CPT

## 2024-02-29 ENCOUNTER — ANTICOAGULATION - WARFARIN VISIT (OUTPATIENT)
Dept: PHARMACY | Facility: CLINIC | Age: 89
End: 2024-02-29
Payer: MEDICARE

## 2024-02-29 DIAGNOSIS — I48.0 PAROXYSMAL ATRIAL FIBRILLATION (MULTI): ICD-10-CM

## 2024-02-29 DIAGNOSIS — I48.91 ATRIAL FIBRILLATION, UNSPECIFIED TYPE (MULTI): Primary | ICD-10-CM

## 2024-02-29 LAB
POC INR: 3.1
POC PROTHROMBIN TIME: NORMAL
PTH-INTACT SERPL-MCNC: 94.9 PG/ML (ref 18.5–88)

## 2024-02-29 PROCEDURE — 99212 OFFICE O/P EST SF 10 MIN: CPT | Performed by: PHARMACIST

## 2024-02-29 PROCEDURE — 85610 PROTHROMBIN TIME: CPT | Mod: QW | Performed by: PHARMACIST

## 2024-02-29 NOTE — PROGRESS NOTES
Verified current dose with pt.    No new meds or med changes since last visit.  Pt denies unusual bleed/bruise.  No upcoming procedures.  Inr = 3.1  Continue same dose and check again in 5 weeks.

## 2024-03-04 NOTE — ASSESSMENT & PLAN NOTE
2/27/24 echocardiogram LVEF =45-50%,  Severe aoritic stenosis, moderate aortic insufficiency, modrate MR, TR with mild to moderately increased RVSP (Reviewed today)

## 2024-04-04 ENCOUNTER — ANTICOAGULATION - WARFARIN VISIT (OUTPATIENT)
Dept: PHARMACY | Facility: CLINIC | Age: 89
End: 2024-04-04
Payer: MEDICARE

## 2024-04-04 DIAGNOSIS — I48.91 ATRIAL FIBRILLATION, UNSPECIFIED TYPE (MULTI): Primary | ICD-10-CM

## 2024-04-04 DIAGNOSIS — I48.0 PAROXYSMAL ATRIAL FIBRILLATION (MULTI): ICD-10-CM

## 2024-04-04 LAB
POC INR: 2.4
POC PROTHROMBIN TIME: NORMAL

## 2024-04-04 PROCEDURE — 99212 OFFICE O/P EST SF 10 MIN: CPT

## 2024-04-04 PROCEDURE — 85610 PROTHROMBIN TIME: CPT | Mod: QW

## 2024-04-04 NOTE — PROGRESS NOTES
Coumadin Clinic Visit Note    Patient verified warfarin dose  No missed doses  No unusual bruising or bleeding  No changes to medications  Consistent dietary green intake  No anticipated procedures at this time  INR Therapeutic today at 2.4  No changes to warfarin dose today  Next appointment 5 weeks    Chyna Herrera, Pharm D

## 2024-04-11 ENCOUNTER — APPOINTMENT (OUTPATIENT)
Dept: PRIMARY CARE | Facility: CLINIC | Age: 89
End: 2024-04-11
Payer: MEDICARE

## 2024-04-15 ENCOUNTER — OFFICE VISIT (OUTPATIENT)
Dept: GASTROENTEROLOGY | Facility: CLINIC | Age: 89
End: 2024-04-15
Payer: MEDICARE

## 2024-04-15 ENCOUNTER — LAB (OUTPATIENT)
Dept: LAB | Facility: LAB | Age: 89
End: 2024-04-15
Payer: MEDICARE

## 2024-04-15 VITALS
HEART RATE: 60 BPM | DIASTOLIC BLOOD PRESSURE: 74 MMHG | WEIGHT: 178 LBS | BODY MASS INDEX: 26.36 KG/M2 | SYSTOLIC BLOOD PRESSURE: 156 MMHG | HEIGHT: 69 IN

## 2024-04-15 DIAGNOSIS — R14.0 ABDOMINAL BLOATING: ICD-10-CM

## 2024-04-15 DIAGNOSIS — E11.9 TYPE 2 DIABETES MELLITUS WITHOUT COMPLICATIONS (MULTI): ICD-10-CM

## 2024-04-15 DIAGNOSIS — K22.70 BARRETT'S ESOPHAGUS WITHOUT DYSPLASIA: Primary | ICD-10-CM

## 2024-04-15 DIAGNOSIS — R10.9 ABDOMINAL PAIN, UNSPECIFIED ABDOMINAL LOCATION: ICD-10-CM

## 2024-04-15 DIAGNOSIS — K59.09 CHRONIC CONSTIPATION: ICD-10-CM

## 2024-04-15 DIAGNOSIS — E03.9 HYPOTHYROIDISM, UNSPECIFIED: Primary | ICD-10-CM

## 2024-04-15 DIAGNOSIS — R12 HEARTBURN: ICD-10-CM

## 2024-04-15 DIAGNOSIS — E55.9 VITAMIN D DEFICIENCY, UNSPECIFIED: ICD-10-CM

## 2024-04-15 DIAGNOSIS — K80.20 ASYMPTOMATIC CHOLELITHIASIS: ICD-10-CM

## 2024-04-15 LAB
25(OH)D3 SERPL-MCNC: 33 NG/ML (ref 30–100)
ALBUMIN SERPL BCP-MCNC: 4 G/DL (ref 3.4–5)
ALP SERPL-CCNC: 90 U/L (ref 33–136)
ALT SERPL W P-5'-P-CCNC: 21 U/L (ref 10–52)
ANION GAP SERPL CALC-SCNC: 12 MMOL/L (ref 10–20)
AST SERPL W P-5'-P-CCNC: 20 U/L (ref 9–39)
BILIRUB SERPL-MCNC: 0.6 MG/DL (ref 0–1.2)
BUN SERPL-MCNC: 26 MG/DL (ref 6–23)
CALCIUM SERPL-MCNC: 8.7 MG/DL (ref 8.6–10.3)
CHLORIDE SERPL-SCNC: 103 MMOL/L (ref 98–107)
CO2 SERPL-SCNC: 28 MMOL/L (ref 21–32)
CREAT SERPL-MCNC: 1.48 MG/DL (ref 0.5–1.3)
EGFRCR SERPLBLD CKD-EPI 2021: 44 ML/MIN/1.73M*2
EST. AVERAGE GLUCOSE BLD GHB EST-MCNC: 183 MG/DL
GLUCOSE SERPL-MCNC: 206 MG/DL (ref 74–99)
HBA1C MFR BLD: 8 %
POTASSIUM SERPL-SCNC: 4.7 MMOL/L (ref 3.5–5.3)
PROT SERPL-MCNC: 6.3 G/DL (ref 6.4–8.2)
SODIUM SERPL-SCNC: 138 MMOL/L (ref 136–145)
T4 FREE SERPL-MCNC: 1.1 NG/DL (ref 0.61–1.12)
TSH SERPL-ACNC: 3.32 MIU/L (ref 0.44–3.98)

## 2024-04-15 PROCEDURE — 1160F RVW MEDS BY RX/DR IN RCRD: CPT | Performed by: STUDENT IN AN ORGANIZED HEALTH CARE EDUCATION/TRAINING PROGRAM

## 2024-04-15 PROCEDURE — 80053 COMPREHEN METABOLIC PANEL: CPT

## 2024-04-15 PROCEDURE — 1036F TOBACCO NON-USER: CPT | Performed by: STUDENT IN AN ORGANIZED HEALTH CARE EDUCATION/TRAINING PROGRAM

## 2024-04-15 PROCEDURE — 99214 OFFICE O/P EST MOD 30 MIN: CPT | Performed by: STUDENT IN AN ORGANIZED HEALTH CARE EDUCATION/TRAINING PROGRAM

## 2024-04-15 PROCEDURE — 82306 VITAMIN D 25 HYDROXY: CPT

## 2024-04-15 PROCEDURE — 36415 COLL VENOUS BLD VENIPUNCTURE: CPT

## 2024-04-15 PROCEDURE — 84443 ASSAY THYROID STIM HORMONE: CPT

## 2024-04-15 PROCEDURE — 83036 HEMOGLOBIN GLYCOSYLATED A1C: CPT

## 2024-04-15 PROCEDURE — 1159F MED LIST DOCD IN RCRD: CPT | Performed by: STUDENT IN AN ORGANIZED HEALTH CARE EDUCATION/TRAINING PROGRAM

## 2024-04-15 PROCEDURE — 84439 ASSAY OF FREE THYROXINE: CPT

## 2024-04-15 RX ORDER — SIMETHICONE 80 MG
80 TABLET,CHEWABLE ORAL EVERY 6 HOURS PRN
Qty: 30 TABLET | Refills: 11 | Status: SHIPPED | OUTPATIENT
Start: 2024-04-15 | End: 2025-04-15

## 2024-04-15 RX ORDER — AMOXICILLIN 250 MG
2 CAPSULE ORAL DAILY
Qty: 60 TABLET | Refills: 11 | Status: SHIPPED | OUTPATIENT
Start: 2024-04-15 | End: 2025-04-15

## 2024-04-15 RX ORDER — PANTOPRAZOLE SODIUM 40 MG/1
40 TABLET, DELAYED RELEASE ORAL DAILY
Qty: 90 TABLET | Refills: 3 | Status: SHIPPED | OUTPATIENT
Start: 2024-04-15

## 2024-04-15 NOTE — PATIENT INSTRUCTIONS
1- continue taking Protonix daily so it can protect your stomach, I sent you a new prescription, continue taking iron supplementation and vitamin C 500 mg daily, will monitor your blood levels levels, please avoid NSAIDs  2-please start taking senna Colace 1 or 2 tablets daily, Gas-X 3 or 4 times a day

## 2024-04-15 NOTE — PROGRESS NOTES
2021  87-year-old gentleman with history of hiatal hernia, gallstones, colon tubular adenomas, H. pylori status post treatment with repeat biopsy negative as per previous chart, CKD, antral ulcer, active NSAIDs use presenting for follow-up for iron deficiency anemia. The patient was seen previously for abdominal discomfort for 2-month, right periumbilical, occurring when he lies down on his right side, not related to diet or bowel movements, lasting for few minutes, he reports a bulging on the right periumbilical area. CAT scan performed showing only gallstones. Currently patient denies any abdominal pain and denies any other GI symptoms     3/1/2021 patient seen for follow-up, denies any GI complaints, mentions that he still taking iron twice daily, stopped Protonix, still on Coumadin     9/2022  Patient is here for follow-up, feeling well, denies any melena hematochezia or hematemesis, denies any other GI symptoms     3/17/2023  Patient is here for follow-up, feeling well, denies any melena hematochezia or hematemesis, asking for a sleeping pill, I referred him to his primary doctor, hemoglobin 11.4 better than before     9/15/2023  Patient is here for follow-up, feeling well, denies any heartburn, taking Protonix every day, still taking iron supplementation, denies any melena hematochezia or hematemesis.     4/15/2024  Patient is here for follow-up, requesting stool softeners because he had stool have a lot of prunes in order to go to the bathroom, mentions having significant amount of gas    5/2016 EGD antral ulcers, hiatal hernia  8/2014 colonoscopy for history of tubular adenomas, hemorrhoids, repeat colonoscopy in 5 years  Family history not pertinent to clinical presentation  Normally has 2 bowel movements per day, has a prune daily, normal, no blood, no weight loss  Denies NSAIDs, social alcohol drinker, denies IV drug use smoking marijuana                  The note was created using voice recognition  transcription software. Despite proofreading, unintentional typographical errors may be present. Please contact the GI office with any questions or concerns.     Current Medications: reviewed    A 10 point review of system is negative except for what is mentioned in the HPI    Follow up with GI was advised       Vital Signs: Reviewed    Physical Exam:  General: no apparent distress, pleasant and cooperative  Skin:  Warm and dry, no jaundice  HEENT: No scleral icterus, no conjunctival pallor, normocephalic, atraumatic, mucous membranes moist  Neck:  atraumatic, trachea midline, no JVD  Chest:  decreased air entry to auscultation bilaterally. No wheezes, rales, or rhonchi  CV: Positive heart murmur, regular rate and rhythm.  Positive S1/S2  Abdomen: no distension, +BS, soft, non-tender to palpation, no rebound tenderness, no guarding, no rigidity, no discernible ascites   Extremities: no lower extremity edema, Chronic pigmentary changes, no cyanosis  Neurological:  A&Ox3 , no asterixis  Psychiatric: cooperative     Investigations:  Labs, radiological imaging and cardiac work up were reviewed         1-born in 1933, screen for hepatitis C     2-BMI 26, healthy lifestyle advised     3-Healthcare maintenance, screening colonoscopy not recommended in the patient's age group     4-previous abdominal pain for 2 months currently resolved, right periumbilical, not related to diet or BMs, worse with palpation,CAT scan of the abdomen noncontrast on 10/2/2020 showing gallstones with signifcant stools, currently pain resolved, mentions significant gas, start senna Colace 1 or 2 tablets daily, Gas-X, lifestyle changes advised     5-reported iron deficiency anemia, iron studies 1/2023 suggestive of anemia of chronic disease, EGD and colonoscopy as above, continue with iron supplementation, continue Protonix daily in view of prior history of gastric ulcers, Coumadin intake, advanced age, previously offered egd and colonoscopy  prefers conservative management for now, risks and benefits explained to the patient who agreed and verbalized understanding to conservative management, monitor CBC     6-history of Kraus's 2014, antral ulcer, history of H. pylori status post treatment, repeat biopsies negative as per previous charts, history of NSAIDs use stopped, repeat upper endoscopy offered to the patient however he prefers conservative management as mentioned above, risks and benefits explained as above, continue Protonix daily     7-history of colon polyps, last colonoscopy 8/2014 hemorrhoids, recommended to repeat colonoscopy in 5 years, repeat colonoscopy offered however patient prefers conservative management as mentioned above, in addition to the fact that screening is not recommended for the patient's age group

## 2024-04-20 DIAGNOSIS — E78.5 HYPERLIPIDEMIA, UNSPECIFIED HYPERLIPIDEMIA TYPE: ICD-10-CM

## 2024-04-20 DIAGNOSIS — E03.9 ACQUIRED HYPOTHYROIDISM: ICD-10-CM

## 2024-04-20 DIAGNOSIS — I48.91 ATRIAL FIBRILLATION, UNSPECIFIED TYPE (MULTI): ICD-10-CM

## 2024-04-22 DIAGNOSIS — E03.9 ACQUIRED HYPOTHYROIDISM: ICD-10-CM

## 2024-04-22 RX ORDER — LEVOTHYROXINE SODIUM 100 UG/1
100 TABLET ORAL DAILY
Qty: 90 TABLET | Refills: 3 | Status: SHIPPED | OUTPATIENT
Start: 2024-04-22

## 2024-04-23 ENCOUNTER — OFFICE VISIT (OUTPATIENT)
Dept: PRIMARY CARE | Facility: CLINIC | Age: 89
End: 2024-04-23
Payer: MEDICARE

## 2024-04-23 VITALS
WEIGHT: 182 LBS | OXYGEN SATURATION: 97 % | BODY MASS INDEX: 27.58 KG/M2 | SYSTOLIC BLOOD PRESSURE: 112 MMHG | DIASTOLIC BLOOD PRESSURE: 64 MMHG | TEMPERATURE: 97.3 F | HEIGHT: 68 IN

## 2024-04-23 DIAGNOSIS — Z71.89 ADVANCE DIRECTIVE DISCUSSED WITH PATIENT: ICD-10-CM

## 2024-04-23 DIAGNOSIS — E78.5 HYPERLIPIDEMIA, UNSPECIFIED HYPERLIPIDEMIA TYPE: ICD-10-CM

## 2024-04-23 DIAGNOSIS — Z13.39 ALCOHOL SCREENING: ICD-10-CM

## 2024-04-23 DIAGNOSIS — N18.4 STAGE 4 CHRONIC KIDNEY DISEASE (MULTI): ICD-10-CM

## 2024-04-23 DIAGNOSIS — E11.9 TYPE 2 DIABETES MELLITUS WITHOUT COMPLICATION, WITHOUT LONG-TERM CURRENT USE OF INSULIN (MULTI): ICD-10-CM

## 2024-04-23 DIAGNOSIS — Z00.00 MEDICARE ANNUAL WELLNESS VISIT, SUBSEQUENT: Primary | ICD-10-CM

## 2024-04-23 DIAGNOSIS — I48.91 ATRIAL FIBRILLATION, UNSPECIFIED TYPE (MULTI): ICD-10-CM

## 2024-04-23 DIAGNOSIS — Z85.46 HISTORY OF PROSTATE CANCER: ICD-10-CM

## 2024-04-23 DIAGNOSIS — E03.9 ACQUIRED HYPOTHYROIDISM: ICD-10-CM

## 2024-04-23 DIAGNOSIS — Z13.31 DEPRESSION SCREEN: ICD-10-CM

## 2024-04-23 DIAGNOSIS — N25.81 SECONDARY HYPERPARATHYROIDISM OF RENAL ORIGIN (MULTI): ICD-10-CM

## 2024-04-23 PROCEDURE — 1036F TOBACCO NON-USER: CPT | Performed by: INTERNAL MEDICINE

## 2024-04-23 PROCEDURE — 99214 OFFICE O/P EST MOD 30 MIN: CPT | Performed by: INTERNAL MEDICINE

## 2024-04-23 PROCEDURE — 1170F FXNL STATUS ASSESSED: CPT | Performed by: INTERNAL MEDICINE

## 2024-04-23 PROCEDURE — G0442 ANNUAL ALCOHOL SCREEN 15 MIN: HCPCS | Performed by: INTERNAL MEDICINE

## 2024-04-23 PROCEDURE — G0439 PPPS, SUBSEQ VISIT: HCPCS | Performed by: INTERNAL MEDICINE

## 2024-04-23 PROCEDURE — 1160F RVW MEDS BY RX/DR IN RCRD: CPT | Performed by: INTERNAL MEDICINE

## 2024-04-23 PROCEDURE — G0444 DEPRESSION SCREEN ANNUAL: HCPCS | Performed by: INTERNAL MEDICINE

## 2024-04-23 PROCEDURE — 1159F MED LIST DOCD IN RCRD: CPT | Performed by: INTERNAL MEDICINE

## 2024-04-23 PROCEDURE — 99497 ADVNCD CARE PLAN 30 MIN: CPT | Performed by: INTERNAL MEDICINE

## 2024-04-23 RX ORDER — ATORVASTATIN CALCIUM 10 MG/1
10 TABLET, FILM COATED ORAL NIGHTLY
Qty: 90 TABLET | Refills: 3 | Status: SHIPPED | OUTPATIENT
Start: 2024-04-23

## 2024-04-23 RX ORDER — WARFARIN 2.5 MG/1
2.5 TABLET ORAL
Qty: 90 TABLET | Refills: 3 | Status: SHIPPED | OUTPATIENT
Start: 2024-04-23

## 2024-04-23 ASSESSMENT — ACTIVITIES OF DAILY LIVING (ADL)
DOING_HOUSEWORK: INDEPENDENT
BATHING: INDEPENDENT
DRESSING: INDEPENDENT
TAKING_MEDICATION: INDEPENDENT
GROCERY_SHOPPING: INDEPENDENT
MANAGING_FINANCES: INDEPENDENT

## 2024-04-23 ASSESSMENT — PATIENT HEALTH QUESTIONNAIRE - PHQ9
2. FEELING DOWN, DEPRESSED OR HOPELESS: SEVERAL DAYS
1. LITTLE INTEREST OR PLEASURE IN DOING THINGS: SEVERAL DAYS
SUM OF ALL RESPONSES TO PHQ9 QUESTIONS 1 AND 2: 2

## 2024-04-23 NOTE — PROGRESS NOTES
"My nurse note reviewed. Patient is here for:  Medicare Annual Wellness Visit Subsequent (Would like a paper script of anirudh prescription)       Pt is here for medicare wellness and follow up     Patient denies any shortness of breath, PND, orthopnea, chest pain , palpitation, syncope or edema in legs  patient denies any abdominal pain, tenderness, nausea, vomiting, change in bowel habits or blood in stool. Takes stool softner   Patient denies any weakness in extremities.. Denies any headache,  speech problems or  tremors . No TIA or stroke like symptoms.. he has double vision all his life as per him .     Lives alone,   Patient is independent with ADLs. Patient does drive. No recent accidents  walks with cane's assistance.   No recent falls.    Taking meds ok       During Medicare wellness apart from looking at assessment done by nurse,  I also asked following :    Alcohol use : Alcohol screening  was  done during this visit. Patient is not having any issue with increase alcohol use . No ETOH abuse was observed by history . Very seldom drinks   Non smoker   HIV test: patient was not found to be high risk for HIV     Cognitive issue : None     Advanced Directive: Patient has advanced directive including living will and Health care power of . Health HILDA is son Lex and daughter in law Baylee  . All questions related to it answered. Total time > 16 min.     I have reviewed all active medications patient is currently on . Questions related to medication answered to patient's satisfaction.    REVIEW OF SYSTEMS:   All other systems have been reviewed and are negative in relation to patient's complaint and other than what is mentioned in History of present illness.    OBJECTIVE :  Looks younger than his age  /64   Temp 36.3 °C (97.3 °F)   Ht 1.727 m (5' 8\")   Wt 82.6 kg (182 lb)   SpO2 97%   BMI 27.67 kg/m²   Vitals noted   Not in acute distress  Conj Pink, No icterus  Neck:No Cervical LN enlargement, " No Thyroid enlargement No carotid bruit  Lungs: good air entry bilaterally, no rales or rhonchi  CVS: S1 S2 + , no S3. No loud heart murmur heard.   Abdomen: Soft, non tender , BS +. no organomegaly , no CVA tenderness  MSK: No spine tenderness or muscle tenderness noted on gross examination  CNS: Pt is alert, moving all 4 extremities. no motor weakness or abnormal movements noted on gross examination.  Extremities: No edema, No calf tenderness, Francisco's sign negative.    Assessment:  1. Medicare annual wellness visit, subsequent  Done. Tests ordered      2. Alcohol screening        3. Advance directive discussed with patient        4. Depression screen        5. Type 2 diabetes mellitus without complication, without long-term current use of insulin ([facility])  Lipid Panel Non-Fasting    Albumin , Urine Random      6. Acquired hypothyroidism  Hx of. stable      7. Stage 4 chronic kidney disease ([facility])  Sees Dr Higgins      8. Atrial fibrillation, unspecified type ([facility])  Rate controlled      9. Hyperlipidemia, unspecified hyperlipidemia type  Blood tests      10. History of prostate cancer        11. Secondary hyperparathyroidism of renal origin ([facility])  Sees Nephrologist Dr Higgins        Plan  Medicare wellness done  Discussed about Med alert button or necklace. All questions answered  Fall prevention discussed  Current medications are effective. advised to continue current medications.  Blood and urine test ordered  Has appt with Dr Rushing in few days for his Diabetes  F/U  6 months and in 12 months for Medicare wellness

## 2024-04-23 NOTE — PATIENT INSTRUCTIONS
Plan  Medicare wellness done  Discussed about Med alert button or necklace.  Fall prevention discussed  Current medications are effective. advised to continue current medications.  Blood and urine test ordered  Has appt with Dr Rushing in few days for his Diabetes  F/U  6 months and in 12 months for Medicare wellness

## 2024-04-24 RX ORDER — LEVOTHYROXINE SODIUM 100 UG/1
100 TABLET ORAL DAILY
Qty: 90 TABLET | Refills: 3 | Status: SHIPPED | OUTPATIENT
Start: 2024-04-24

## 2024-05-03 ENCOUNTER — APPOINTMENT (OUTPATIENT)
Dept: GASTROENTEROLOGY | Facility: CLINIC | Age: 89
End: 2024-05-03
Payer: MEDICARE

## 2024-05-08 ENCOUNTER — ANTICOAGULATION - WARFARIN VISIT (OUTPATIENT)
Dept: PHARMACY | Facility: CLINIC | Age: 89
End: 2024-05-08
Payer: MEDICARE

## 2024-05-08 DIAGNOSIS — I48.0 PAROXYSMAL ATRIAL FIBRILLATION (MULTI): ICD-10-CM

## 2024-05-08 DIAGNOSIS — I48.91 ATRIAL FIBRILLATION, UNSPECIFIED TYPE (MULTI): Primary | ICD-10-CM

## 2024-05-08 LAB
POC INR: 2.3
POC PROTHROMBIN TIME: NORMAL

## 2024-05-08 PROCEDURE — 99212 OFFICE O/P EST SF 10 MIN: CPT

## 2024-05-08 PROCEDURE — 85610 PROTHROMBIN TIME: CPT | Mod: QW

## 2024-05-08 NOTE — PROGRESS NOTES
Coumadin Clinic Visit Note    Patient verified warfarin dose  No missed doses  No unusual bruising or bleeding  No changes to medications  Consistent dietary green intake  No anticipated procedures at this time  INR Therapeutic today at 2.3  No changes to warfarin dose today  Next appointment 5 weeks      Paolo LopezD

## 2024-05-09 ENCOUNTER — APPOINTMENT (OUTPATIENT)
Dept: PHARMACY | Facility: CLINIC | Age: 89
End: 2024-05-09
Payer: MEDICARE

## 2024-06-12 ENCOUNTER — ANTICOAGULATION - WARFARIN VISIT (OUTPATIENT)
Dept: PHARMACY | Facility: CLINIC | Age: 89
End: 2024-06-12
Payer: MEDICARE

## 2024-06-12 DIAGNOSIS — I48.0 PAROXYSMAL ATRIAL FIBRILLATION (MULTI): ICD-10-CM

## 2024-06-12 DIAGNOSIS — I48.91 ATRIAL FIBRILLATION, UNSPECIFIED TYPE (MULTI): Primary | ICD-10-CM

## 2024-06-12 LAB
POC INR: 2.2
POC PROTHROMBIN TIME: NORMAL

## 2024-06-12 PROCEDURE — 99212 OFFICE O/P EST SF 10 MIN: CPT | Performed by: PHARMACIST

## 2024-06-12 PROCEDURE — 85610 PROTHROMBIN TIME: CPT | Mod: QW | Performed by: PHARMACIST

## 2024-06-12 NOTE — PROGRESS NOTES
Coumadin Clinic Visit Note    Patient presents today for anticoagulation monitoring and adjustment.  Patient verified warfarin dosing schedule  Patient denies missing any doses  Patient denies unusual bruising or bleeding  Patient denies changes to medications, alcohol or tobacco use.  Consistent dietary green intake  There are no anticipated procedures at this time  INR Therapeutic today at 2.2  No changes to warfarin dose today  Next appointment 5 weeks      Esther Rae, PharmD

## 2024-07-14 ENCOUNTER — HOSPITAL ENCOUNTER (EMERGENCY)
Facility: HOSPITAL | Age: 89
Discharge: HOME | End: 2024-07-14
Payer: MEDICARE

## 2024-07-14 VITALS
RESPIRATION RATE: 18 BRPM | TEMPERATURE: 96.8 F | DIASTOLIC BLOOD PRESSURE: 84 MMHG | BODY MASS INDEX: 25.92 KG/M2 | WEIGHT: 175 LBS | HEART RATE: 71 BPM | HEIGHT: 69 IN | SYSTOLIC BLOOD PRESSURE: 137 MMHG | OXYGEN SATURATION: 96 %

## 2024-07-14 DIAGNOSIS — T14.8XXA ABRASION: Primary | ICD-10-CM

## 2024-07-14 PROCEDURE — 99282 EMERGENCY DEPT VISIT SF MDM: CPT | Mod: 25

## 2024-07-14 PROCEDURE — 90471 IMMUNIZATION ADMIN: CPT | Performed by: PHYSICIAN ASSISTANT

## 2024-07-14 PROCEDURE — 90715 TDAP VACCINE 7 YRS/> IM: CPT | Performed by: PHYSICIAN ASSISTANT

## 2024-07-14 PROCEDURE — 2500000004 HC RX 250 GENERAL PHARMACY W/ HCPCS (ALT 636 FOR OP/ED): Performed by: PHYSICIAN ASSISTANT

## 2024-07-14 ASSESSMENT — COLUMBIA-SUICIDE SEVERITY RATING SCALE - C-SSRS
6. HAVE YOU EVER DONE ANYTHING, STARTED TO DO ANYTHING, OR PREPARED TO DO ANYTHING TO END YOUR LIFE?: NO
2. HAVE YOU ACTUALLY HAD ANY THOUGHTS OF KILLING YOURSELF?: NO
1. IN THE PAST MONTH, HAVE YOU WISHED YOU WERE DEAD OR WISHED YOU COULD GO TO SLEEP AND NOT WAKE UP?: NO

## 2024-07-14 ASSESSMENT — LIFESTYLE VARIABLES
HAVE PEOPLE ANNOYED YOU BY CRITICIZING YOUR DRINKING: NO
TOTAL SCORE: 0
EVER FELT BAD OR GUILTY ABOUT YOUR DRINKING: NO
EVER HAD A DRINK FIRST THING IN THE MORNING TO STEADY YOUR NERVES TO GET RID OF A HANGOVER: NO
HAVE YOU EVER FELT YOU SHOULD CUT DOWN ON YOUR DRINKING: NO

## 2024-07-14 NOTE — ED PROVIDER NOTES
HPI   Chief Complaint   Patient presents with    Abrasion       HPI This is a 89-year-old male who states he sustained an abrasion on his left arm when he scraped the railing when he missed stepped 1 step.  He did not pass out he not was conscious he did not fall.  He is on a blood thinner for heart issues.  He is not up-to-date on his tetanus.  He states he cleaned it wrapped upper presents here because he figured he needed expert help to look at it.  He is followed by Dr. Araujo.  Denies any pus or oozing from the area.  It is slightly making the bandage bleed a bit he states.  Therefore he presents.  He is able to move his hand without any difficulty.  No other symptoms.                    No data recorded                   Patient History   No past medical history on file.  No past surgical history on file.  Family History   Problem Relation Name Age of Onset    No Known Problems Mother      No Known Problems Father       Social History     Tobacco Use    Smoking status: Former     Types: Cigarettes    Smokeless tobacco: Never   Substance Use Topics    Alcohol use: Not on file    Drug use: Not on file       Physical Exam   ED Triage Vitals [07/14/24 1119]   Temperature Heart Rate Respirations BP   36 °C (96.8 °F) 71 18 137/84      Pulse Ox Temp src Heart Rate Source Patient Position   96 % -- -- --      BP Location FiO2 (%)     -- --       Physical Exam  Family history, social history, and allergies reviewed    Review of systems as noted in history of present illness otherwise negative    EXAM:  General: Vitals noted, no distress. Afebrile.  EENT: TMs clear. Eyes unremarkable. Posterior oropharynx unremarkable.  Cardiac: Regular, rate, rhythm, no murmur.  Pulmonary: Lungs clear bilaterally with good aeration. No adventitious breath sounds.  Abdomen: Soft, nontender, nonsurgical. No peritoneal signs. Normoactive bowel sounds.  Extremities: No peripheral edema. Exam of the hand shows : The skin abraded on the  left arm with thin flap noted curled up.  There is an area of bleeding in the dorsal radial aspect.. Is neurovascularly intact distally. Specifically, has full strength with flexion and extension of the digits. Is nontender over the wrist. Remainder the extremity is nontender.  Skin: No rash.  Neuro: No focal neurologic deficits,     ED Course & MDM   Diagnoses as of 07/14/24 1145   Abrasion   Quick clot was applied bandage was applied tetanus vaccine given    Medical Decision Making  HomeGoing follow-up with your primary care doctor.  Leave bandage in place for several days then may change outer dressing    Procedure  Procedures     Jenny Camarena PA-C  07/14/24 1149

## 2024-07-14 NOTE — DISCHARGE INSTRUCTIONS
Leave the 1 bandage on your arm but may remove the outer bandage and reapply.  You may reapply if it gets wet.  Keep clean and dry.  See your tetanus vaccine as well today.

## 2024-07-17 ENCOUNTER — ANTICOAGULATION - WARFARIN VISIT (OUTPATIENT)
Dept: PHARMACY | Facility: CLINIC | Age: 89
End: 2024-07-17
Payer: MEDICARE

## 2024-07-17 DIAGNOSIS — I48.0 PAROXYSMAL ATRIAL FIBRILLATION (MULTI): ICD-10-CM

## 2024-07-17 DIAGNOSIS — I48.91 ATRIAL FIBRILLATION, UNSPECIFIED TYPE (MULTI): Primary | ICD-10-CM

## 2024-07-17 LAB
POC INR: 2.2
POC PROTHROMBIN TIME: NORMAL

## 2024-07-17 PROCEDURE — 99212 OFFICE O/P EST SF 10 MIN: CPT

## 2024-07-17 PROCEDURE — 85610 PROTHROMBIN TIME: CPT | Mod: QW

## 2024-07-17 NOTE — PROGRESS NOTES
"Coumadin Clinic Visit Note    Patient verified warfarin dose of 2.5 mg on Mon Wed, 1.25 mg all other days of week  No missed doses  No unusual bruising or bleeding, although patient does have a lot of silver dollar size bruises on his arms, states that is \"usual\" for him   No changes to medications  Consistent dietary green intake  No anticipated procedures at this time  INR Therapeutic today at 2.2  No changes to warfarin dose today  Next appointment 5 weeks    Chyna Herrera, Pharm D   "

## 2024-08-14 ENCOUNTER — LAB (OUTPATIENT)
Dept: LAB | Facility: LAB | Age: 89
End: 2024-08-14
Payer: MEDICARE

## 2024-08-14 DIAGNOSIS — E55.9 VITAMIN D DEFICIENCY, UNSPECIFIED: ICD-10-CM

## 2024-08-14 DIAGNOSIS — E03.9 HYPOTHYROIDISM, UNSPECIFIED: ICD-10-CM

## 2024-08-14 DIAGNOSIS — E78.5 HYPERLIPIDEMIA, UNSPECIFIED: Primary | ICD-10-CM

## 2024-08-14 DIAGNOSIS — E11.9 TYPE 2 DIABETES MELLITUS WITHOUT COMPLICATIONS (MULTI): ICD-10-CM

## 2024-08-14 LAB
25(OH)D3 SERPL-MCNC: 29 NG/ML (ref 30–100)
ALBUMIN SERPL BCP-MCNC: 3.8 G/DL (ref 3.4–5)
ALP SERPL-CCNC: 92 U/L (ref 33–136)
ALT SERPL W P-5'-P-CCNC: 31 U/L (ref 10–52)
ANION GAP SERPL CALC-SCNC: 11 MMOL/L (ref 10–20)
AST SERPL W P-5'-P-CCNC: 25 U/L (ref 9–39)
BILIRUB SERPL-MCNC: 0.6 MG/DL (ref 0–1.2)
BUN SERPL-MCNC: 30 MG/DL (ref 6–23)
CALCIUM SERPL-MCNC: 8.4 MG/DL (ref 8.6–10.3)
CHLORIDE SERPL-SCNC: 104 MMOL/L (ref 98–107)
CO2 SERPL-SCNC: 28 MMOL/L (ref 21–32)
CREAT SERPL-MCNC: 1.75 MG/DL (ref 0.5–1.3)
EGFRCR SERPLBLD CKD-EPI 2021: 36 ML/MIN/1.73M*2
EST. AVERAGE GLUCOSE BLD GHB EST-MCNC: 180 MG/DL
GLUCOSE SERPL-MCNC: 230 MG/DL (ref 74–99)
HBA1C MFR BLD: 7.9 %
POTASSIUM SERPL-SCNC: 4.4 MMOL/L (ref 3.5–5.3)
PROT SERPL-MCNC: 6.3 G/DL (ref 6.4–8.2)
SODIUM SERPL-SCNC: 139 MMOL/L (ref 136–145)
T4 FREE SERPL-MCNC: 1.18 NG/DL (ref 0.61–1.12)
TSH SERPL-ACNC: 3.14 MIU/L (ref 0.44–3.98)

## 2024-08-14 PROCEDURE — 80053 COMPREHEN METABOLIC PANEL: CPT

## 2024-08-14 PROCEDURE — 82306 VITAMIN D 25 HYDROXY: CPT

## 2024-08-14 PROCEDURE — 83036 HEMOGLOBIN GLYCOSYLATED A1C: CPT

## 2024-08-14 PROCEDURE — 36415 COLL VENOUS BLD VENIPUNCTURE: CPT

## 2024-08-14 PROCEDURE — 84439 ASSAY OF FREE THYROXINE: CPT

## 2024-08-14 PROCEDURE — 84443 ASSAY THYROID STIM HORMONE: CPT

## 2024-08-21 ENCOUNTER — ANTICOAGULATION - WARFARIN VISIT (OUTPATIENT)
Dept: PHARMACY | Facility: CLINIC | Age: 89
End: 2024-08-21
Payer: MEDICARE

## 2024-08-21 DIAGNOSIS — I48.0 PAROXYSMAL ATRIAL FIBRILLATION (MULTI): ICD-10-CM

## 2024-08-21 DIAGNOSIS — I48.91 ATRIAL FIBRILLATION, UNSPECIFIED TYPE (MULTI): Primary | ICD-10-CM

## 2024-08-21 LAB
POC INR: 2.6
POC PROTHROMBIN TIME: NORMAL

## 2024-08-21 PROCEDURE — 99212 OFFICE O/P EST SF 10 MIN: CPT

## 2024-08-21 PROCEDURE — 85610 PROTHROMBIN TIME: CPT | Mod: QW

## 2024-08-21 NOTE — PROGRESS NOTES
Saul Asif is a 89 y.o. male with history of atrial fibrillation who presents today for anticoagulation monitoring and adjustment. Last INR 2.2 on 7/17/24 (5 week follow up)    Current dose: 2.5 mg every Mon, Wed; 1.25 mg all other days   INR goal: 2-3     Today's INR: 2.6  - therapeutic for this patient     Missed doses since last INR visit: No   Signs or symptoms of clotting and/or stroke: No   Any hematuria, epistaxis, rectal bleeding: No   Changes in diet, alcohol, or tobacco use: No   Reviewed medication list and drug allergies with patient, updated any medication additions or modifications accordingly  Acetaminophen intake: no changes  Any medical or dental procedures scheduled at this time:  No     Plan: Patient was instructed to continue taking maintenance dose   Follow-up: 5 weeks    Justina Tillman, PharmD  8/21/2024 9:50 AM

## 2024-08-23 ENCOUNTER — LAB (OUTPATIENT)
Dept: LAB | Facility: LAB | Age: 89
End: 2024-08-23
Payer: MEDICARE

## 2024-08-23 DIAGNOSIS — N18.30 CHRONIC KIDNEY DISEASE, STAGE 3 UNSPECIFIED (MULTI): Primary | ICD-10-CM

## 2024-08-23 DIAGNOSIS — N25.81 SECONDARY HYPERPARATHYROIDISM OF RENAL ORIGIN (MULTI): ICD-10-CM

## 2024-08-23 LAB
25(OH)D3 SERPL-MCNC: 34 NG/ML (ref 30–100)
ALBUMIN SERPL BCP-MCNC: 4 G/DL (ref 3.4–5)
ANION GAP SERPL CALC-SCNC: 13 MMOL/L (ref 10–20)
BASOPHILS # BLD AUTO: 0.04 X10*3/UL (ref 0–0.1)
BASOPHILS NFR BLD AUTO: 0.7 %
BUN SERPL-MCNC: 26 MG/DL (ref 6–23)
CALCIUM SERPL-MCNC: 8.8 MG/DL (ref 8.6–10.3)
CHLORIDE SERPL-SCNC: 104 MMOL/L (ref 98–107)
CO2 SERPL-SCNC: 27 MMOL/L (ref 21–32)
CREAT SERPL-MCNC: 1.63 MG/DL (ref 0.5–1.3)
CREAT UR-MCNC: 77.9 MG/DL (ref 20–370)
EGFRCR SERPLBLD CKD-EPI 2021: 40 ML/MIN/1.73M*2
EOSINOPHIL # BLD AUTO: 0.12 X10*3/UL (ref 0–0.4)
EOSINOPHIL NFR BLD AUTO: 2.2 %
ERYTHROCYTE [DISTWIDTH] IN BLOOD BY AUTOMATED COUNT: 14.8 % (ref 11.5–14.5)
GLUCOSE SERPL-MCNC: 167 MG/DL (ref 74–99)
HCT VFR BLD AUTO: 32.6 % (ref 41–52)
HGB BLD-MCNC: 10.3 G/DL (ref 13.5–17.5)
IMM GRANULOCYTES # BLD AUTO: 0.01 X10*3/UL (ref 0–0.5)
IMM GRANULOCYTES NFR BLD AUTO: 0.2 % (ref 0–0.9)
LYMPHOCYTES # BLD AUTO: 0.86 X10*3/UL (ref 0.8–3)
LYMPHOCYTES NFR BLD AUTO: 15.6 %
MCH RBC QN AUTO: 31.3 PG (ref 26–34)
MCHC RBC AUTO-ENTMCNC: 31.6 G/DL (ref 32–36)
MCV RBC AUTO: 99 FL (ref 80–100)
MICROALBUMIN UR-MCNC: <7 MG/L
MICROALBUMIN/CREAT UR: NORMAL MG/G{CREAT}
MONOCYTES # BLD AUTO: 0.55 X10*3/UL (ref 0.05–0.8)
MONOCYTES NFR BLD AUTO: 10 %
NEUTROPHILS # BLD AUTO: 3.93 X10*3/UL (ref 1.6–5.5)
NEUTROPHILS NFR BLD AUTO: 71.3 %
NRBC BLD-RTO: 0 /100 WBCS (ref 0–0)
PHOSPHATE SERPL-MCNC: 3.5 MG/DL (ref 2.5–4.9)
PLATELET # BLD AUTO: 136 X10*3/UL (ref 150–450)
POTASSIUM SERPL-SCNC: 4.8 MMOL/L (ref 3.5–5.3)
RBC # BLD AUTO: 3.29 X10*6/UL (ref 4.5–5.9)
SODIUM SERPL-SCNC: 139 MMOL/L (ref 136–145)
WBC # BLD AUTO: 5.5 X10*3/UL (ref 4.4–11.3)

## 2024-08-23 PROCEDURE — 82306 VITAMIN D 25 HYDROXY: CPT

## 2024-08-23 PROCEDURE — 82043 UR ALBUMIN QUANTITATIVE: CPT

## 2024-08-23 PROCEDURE — 36415 COLL VENOUS BLD VENIPUNCTURE: CPT

## 2024-08-23 PROCEDURE — 83970 ASSAY OF PARATHORMONE: CPT

## 2024-08-23 PROCEDURE — 85025 COMPLETE CBC W/AUTO DIFF WBC: CPT

## 2024-08-23 PROCEDURE — 82570 ASSAY OF URINE CREATININE: CPT

## 2024-08-23 PROCEDURE — 80069 RENAL FUNCTION PANEL: CPT

## 2024-08-24 LAB — PTH-INTACT SERPL-MCNC: 129.7 PG/ML (ref 18.5–88)

## 2024-08-26 PROBLEM — E66.811 OBESITY, CLASS I, BMI 30.0-34.9 (SEE ACTUAL BMI): Status: RESOLVED | Noted: 2023-10-12 | Resolved: 2024-08-26

## 2024-08-26 PROBLEM — E66.9 OBESITY, CLASS I, BMI 30.0-34.9 (SEE ACTUAL BMI): Status: RESOLVED | Noted: 2023-10-12 | Resolved: 2024-08-26

## 2024-08-27 ENCOUNTER — HOSPITAL ENCOUNTER (OUTPATIENT)
Dept: CARDIOLOGY | Facility: CLINIC | Age: 89
Discharge: HOME | End: 2024-08-27
Payer: MEDICARE

## 2024-08-27 ENCOUNTER — APPOINTMENT (OUTPATIENT)
Dept: CARDIOLOGY | Facility: CLINIC | Age: 89
End: 2024-08-27
Payer: MEDICARE

## 2024-08-27 VITALS
BODY MASS INDEX: 25.92 KG/M2 | SYSTOLIC BLOOD PRESSURE: 137 MMHG | HEIGHT: 69 IN | WEIGHT: 175 LBS | DIASTOLIC BLOOD PRESSURE: 84 MMHG

## 2024-08-27 VITALS
OXYGEN SATURATION: 95 % | SYSTOLIC BLOOD PRESSURE: 130 MMHG | HEIGHT: 69 IN | BODY MASS INDEX: 25.92 KG/M2 | DIASTOLIC BLOOD PRESSURE: 70 MMHG | HEART RATE: 56 BPM | WEIGHT: 175 LBS

## 2024-08-27 DIAGNOSIS — I06.2 RHEUMATIC AORTIC STENOSIS WITH INSUFFICIENCY: ICD-10-CM

## 2024-08-27 DIAGNOSIS — I48.91 ATRIAL FIBRILLATION, UNSPECIFIED TYPE (MULTI): ICD-10-CM

## 2024-08-27 DIAGNOSIS — I35.0 SEVERE AORTIC STENOSIS: ICD-10-CM

## 2024-08-27 DIAGNOSIS — N18.4 STAGE 4 CHRONIC KIDNEY DISEASE (MULTI): ICD-10-CM

## 2024-08-27 DIAGNOSIS — I48.0 PAROXYSMAL ATRIAL FIBRILLATION (MULTI): ICD-10-CM

## 2024-08-27 DIAGNOSIS — C61 PROSTATE CANCER (MULTI): ICD-10-CM

## 2024-08-27 DIAGNOSIS — Z96.659 ARTIFICIAL KNEE JOINT PRESENT, UNSPECIFIED LATERALITY: ICD-10-CM

## 2024-08-27 DIAGNOSIS — E78.5 HYPERLIPIDEMIA, UNSPECIFIED HYPERLIPIDEMIA TYPE: ICD-10-CM

## 2024-08-27 DIAGNOSIS — I35.0 SEVERE AORTIC STENOSIS: Primary | ICD-10-CM

## 2024-08-27 DIAGNOSIS — K80.20 ASYMPTOMATIC CHOLELITHIASIS: ICD-10-CM

## 2024-08-27 LAB
AORTIC VALVE MEAN GRADIENT: 62.6 MMHG
AORTIC VALVE PEAK VELOCITY: 5.3 M/S
AV PEAK GRADIENT: 112.2 MMHG
AVA (PEAK VEL): 0.56 CM2
AVA (VTI): 0.61 CM2
EJECTION FRACTION APICAL 4 CHAMBER: 64.6
EJECTION FRACTION: 63 %
LEFT ATRIUM VOLUME AREA LENGTH INDEX BSA: 77.4 ML/M2
LEFT VENTRICLE INTERNAL DIMENSION DIASTOLE: 4.72 CM (ref 3.5–6)
LEFT VENTRICULAR OUTFLOW TRACT DIAMETER: 2.28 CM
RIGHT VENTRICLE FREE WALL PEAK S': 0.8 CM/S
RIGHT VENTRICLE PEAK SYSTOLIC PRESSURE: 52.7 MMHG
TRICUSPID ANNULAR PLANE SYSTOLIC EXCURSION: 2 CM

## 2024-08-27 PROCEDURE — 99215 OFFICE O/P EST HI 40 MIN: CPT | Performed by: INTERNAL MEDICINE

## 2024-08-27 PROCEDURE — 1160F RVW MEDS BY RX/DR IN RCRD: CPT | Performed by: INTERNAL MEDICINE

## 2024-08-27 PROCEDURE — 93005 ELECTROCARDIOGRAM TRACING: CPT | Performed by: INTERNAL MEDICINE

## 2024-08-27 PROCEDURE — 93306 TTE W/DOPPLER COMPLETE: CPT | Performed by: INTERNAL MEDICINE

## 2024-08-27 PROCEDURE — 93306 TTE W/DOPPLER COMPLETE: CPT

## 2024-08-27 PROCEDURE — 1036F TOBACCO NON-USER: CPT | Performed by: INTERNAL MEDICINE

## 2024-08-27 PROCEDURE — 1159F MED LIST DOCD IN RCRD: CPT | Performed by: INTERNAL MEDICINE

## 2024-08-27 PROCEDURE — 93010 ELECTROCARDIOGRAM REPORT: CPT | Performed by: INTERNAL MEDICINE

## 2024-08-27 RX ORDER — ATORVASTATIN CALCIUM 10 MG/1
10 TABLET, FILM COATED ORAL NIGHTLY
Qty: 90 TABLET | Refills: 3 | Status: SHIPPED | OUTPATIENT
Start: 2024-08-27

## 2024-08-27 RX ORDER — TORSEMIDE 10 MG/1
10 TABLET ORAL DAILY
Qty: 90 TABLET | Refills: 3 | Status: SHIPPED | OUTPATIENT
Start: 2024-08-27

## 2024-08-27 RX ORDER — PANTOPRAZOLE SODIUM 40 MG/1
40 TABLET, DELAYED RELEASE ORAL DAILY
Qty: 90 TABLET | Refills: 3 | Status: SHIPPED | OUTPATIENT
Start: 2024-08-27

## 2024-08-27 RX ORDER — WARFARIN 2.5 MG/1
2.5 TABLET ORAL NIGHTLY
Qty: 90 TABLET | Refills: 3 | Status: SHIPPED | OUTPATIENT
Start: 2024-08-27

## 2024-08-27 NOTE — ASSESSMENT & PLAN NOTE
8/27/24 severe aortic stenosis with mild aortic insufficiency, moderate to severe LVH, mod. To severe MR, TR with moderately elevated RVSP (reviewed today)

## 2024-08-27 NOTE — PROGRESS NOTES
"  Subjective  Saul Asif  is a 89 y.o. year old male who presents for F/U atrial fibrillation severe aortic stenosis, no chest pain, no dyspnea, no edema but he says he wade not exert himself anymore    Blood pressure 130/70, pulse 56, height 1.753 m (5' 9\"), weight 79.4 kg (175 lb), SpO2 95%.   Patient has no known allergies.  History reviewed. No pertinent past medical history.  History reviewed. No pertinent surgical history.  Family History   Problem Relation Name Age of Onset    No Known Problems Mother      No Known Problems Father       @SOC    Current Outpatient Medications   Medication Sig Dispense Refill    atorvastatin (Lipitor) 10 mg tablet Take 1 tablet (10 mg) by mouth once daily at bedtime. 90 tablet 3    blood sugar diagnostic (Blood Glucose Test) strip 1 strip by abdominal subcutaneous route 2 times a day. twice a day. 200 strip 3    Combigan 0.2-0.5 % ophthalmic solution Administer 1 drop into both eyes every 12 hours.      ferrous sulfate 325 (65 Fe) MG tablet Take 1 tablet by mouth 2 times a day.      glipiZIDE (Glucotrol) 5 mg tablet       glyBURIDE (Diabeta) 5 mg tablet Take 2 tablets (10 mg) by mouth 2 times a day. In the morning and in the evening      insulin degludec (Tresiba FlexTouch U-100) 100 unit/mL (3 mL) injection Inject 8 Units under the skin once daily in the morning. 3 mL 3    ketoconazole (NIZOral) 2 % shampoo Apply topically per week.      levothyroxine (Synthroid, Levoxyl) 100 mcg tablet TAKE 1 TABLET BY MOUTH ONCE  DAILY 90 tablet 3    levothyroxine (Synthroid, Levoxyl) 100 mcg tablet Take 1 tablet (100 mcg) by mouth once daily. 90 tablet 3    metroNIDAZOLE (Metrolotion) 0.75 % lotion lotion Apply topically twice a day. to affected area      oxymetazoline (Afrin, oxymetazoline,) 0.05 % nasal spray Administer 2 sprays into each nostril every 12 hours if needed (nose bleed).      pantoprazole (ProtoNix) 40 mg EC tablet Take 1 tablet (40 mg) by mouth once daily. 90 tablet 3    " "pen needle, diabetic (BD Ultra-Fine Mini Pen Needle) 31 gauge x 3/16\" needle TEST BLOOD SUGAR TWICE A  each 3    pioglitazone (Actos) 45 mg tablet Take 1 tablet (45 mg) by mouth once daily in the morning.      psyllium husk, aspartame, (Metamucil Sugar-Free, aspart,) 3.4 gram/5.8 gram powder Take 1 packet by mouth twice a day.      sennosides-docusate sodium (Elaine-Colace) 8.6-50 mg tablet Take 2 tablets by mouth once daily. 60 tablet 11    simethicone (Gas-X Extra Strength) 125 mg capsule Take 1 capsule (125 mg) by mouth every 12 hours if needed (abd cramps and bloating).      simethicone (Mylicon) 80 mg chewable tablet Chew 1 tablet (80 mg) every 6 hours if needed for flatulence. 30 tablet 11    SITagliptin phosphate (Januvia) 50 mg tablet Take 1 tablet (50 mg) by mouth once daily in the evening.      torsemide (Demadex) 10 mg tablet Take 1 tablet (10 mg) by mouth once daily. 90 tablet 3    warfarin (Coumadin) 2.5 mg tablet Take 1 tablet (2.5 mg) by mouth once daily at bedtime. 90 tablet 3     No current facility-administered medications for this visit.        ROS  Review of Systems   All other systems reviewed and are negative.      Physical Exam  Physical Exam  Constitutional:       Appearance: Normal appearance.   HENT:      Head: Normocephalic and atraumatic.   Cardiovascular:      Rate and Rhythm: Rhythm irregularly irregular.      Heart sounds: Murmur heard.      Crescendo decrescendo systolic murmur is present with a grade of 4/6.   Musculoskeletal:      Right lower leg: No edema.      Left lower leg: No edema.   Skin:     General: Skin is warm and dry.   Neurological:      General: No focal deficit present.      Mental Status: He is alert and oriented to person, place, and time.   Psychiatric:         Mood and Affect: Mood normal.         Behavior: Behavior normal.          EKG  Encounter Date: 08/27/24   ECG 12 Lead    Narrative    Atrial fibrillation at 54/min., RBBB       Problem List Items " Addressed This Visit       Asymptomatic cholelithiasis    Relevant Medications    pantoprazole (ProtoNix) 40 mg EC tablet    Atrial fibrillation (Multi)    Relevant Medications    torsemide (Demadex) 10 mg tablet    warfarin (Coumadin) 2.5 mg tablet    Other Relevant Orders    ECG 12 Lead (Completed)    Hyperlipidemia    Relevant Medications    atorvastatin (Lipitor) 10 mg tablet    Severe aortic stenosis - Primary     8/27/24 severe aortic stenosis with mild aortic insufficiency, moderate to severe LVH, mod. To severe MR, TR with moderately elevated RVSP (reviewed today)         Presence of artificial knee joint    Stage 4 chronic kidney disease (Multi)    Paroxysmal atrial fibrillation (Multi)    Relevant Orders    ECG 12 Lead (Completed)    Prostate cancer (Multi)         Return 6 months with EKG and echocardiogram      Matthew Tubbs MD

## 2024-09-18 DIAGNOSIS — I48.0 PAROXYSMAL ATRIAL FIBRILLATION (MULTI): ICD-10-CM

## 2024-09-25 ENCOUNTER — ANTICOAGULATION - WARFARIN VISIT (OUTPATIENT)
Dept: PHARMACY | Facility: CLINIC | Age: 89
End: 2024-09-25
Payer: MEDICARE

## 2024-09-25 DIAGNOSIS — I48.91 ATRIAL FIBRILLATION, UNSPECIFIED TYPE (MULTI): Primary | ICD-10-CM

## 2024-09-25 DIAGNOSIS — I48.0 PAROXYSMAL ATRIAL FIBRILLATION (MULTI): ICD-10-CM

## 2024-09-25 LAB
POC INR: 2.2
POC PROTHROMBIN TIME: NORMAL

## 2024-09-25 PROCEDURE — 99212 OFFICE O/P EST SF 10 MIN: CPT

## 2024-09-25 PROCEDURE — 85610 PROTHROMBIN TIME: CPT | Mod: QW

## 2024-10-22 ENCOUNTER — APPOINTMENT (OUTPATIENT)
Dept: PRIMARY CARE | Facility: CLINIC | Age: 89
End: 2024-10-22
Payer: MEDICARE

## 2024-10-22 VITALS
HEART RATE: 72 BPM | DIASTOLIC BLOOD PRESSURE: 78 MMHG | SYSTOLIC BLOOD PRESSURE: 124 MMHG | OXYGEN SATURATION: 98 % | TEMPERATURE: 97.3 F

## 2024-10-22 DIAGNOSIS — I48.91 ATRIAL FIBRILLATION, UNSPECIFIED TYPE (MULTI): ICD-10-CM

## 2024-10-22 DIAGNOSIS — H91.90 HOH (HARD OF HEARING): ICD-10-CM

## 2024-10-22 DIAGNOSIS — I35.0 SEVERE AORTIC STENOSIS: ICD-10-CM

## 2024-10-22 DIAGNOSIS — E78.5 HYPERLIPIDEMIA, UNSPECIFIED HYPERLIPIDEMIA TYPE: ICD-10-CM

## 2024-10-22 DIAGNOSIS — E11.9 TYPE 2 DIABETES MELLITUS WITHOUT COMPLICATION, WITHOUT LONG-TERM CURRENT USE OF INSULIN (MULTI): Primary | ICD-10-CM

## 2024-10-22 PROCEDURE — 1036F TOBACCO NON-USER: CPT | Performed by: INTERNAL MEDICINE

## 2024-10-22 PROCEDURE — G2211 COMPLEX E/M VISIT ADD ON: HCPCS | Performed by: INTERNAL MEDICINE

## 2024-10-22 PROCEDURE — 99214 OFFICE O/P EST MOD 30 MIN: CPT | Performed by: INTERNAL MEDICINE

## 2024-10-22 NOTE — PROGRESS NOTES
My nurse note reviewed. Patient is here for:  Follow-up       Here for f/U on DM , HLD, a fib, heart valve issue     Has [place] , wants ears to be checked.     Patient is independent with ADLs. Patient does drive.   walks with cane's assistance.   No recent falls.  Lives alone    Patient denies any shortness of breath, PND, orthopnea, chest pain , palpitation, syncope or edema in legs  Patient denies any weakness in extremities.. Denies any headache, visual symptoms , speech problems or  tremors . No TIA or stroke like symptoms..    OBJECTIVE :  /78   Pulse 72   Temp 36.3 °C (97.3 °F)   SpO2 98%   Ears - wax + in L ear, R ear looks fine  [place]   CVS: S1 S2 + , no S3. No loud heart murmur appreciated. Lungs clear, No edema  Alert, oreinted, moving all 4 extremities    As per pt he had flu shot at [facility] about a month ago   Lab Results   Component Value Date    HGBA1C 7.9 (H) 08/14/2024     During office visit today patient's chronic diagnosis were reviewed and questions related to it answered to patient's satisfaction . Risk factors for heart, stroke were discussed with patient . Discussion about preventative tests were done with patient also . pain issue was discussed as appropriate for patient . I plan to follow up patient's chronic medical conditions as appropriate.     Assessment:  1. Type 2 diabetes mellitus without complication, without long-term current use of insulin (Multi) -controlled  On insulin. Sees Dr Rushing   2. Atrial fibrillation, unspecified type (Multi) -rate controlled. On coumadin   3. Hyperlipidemia, unspecified hyperlipidemia type hx of   4. [place] (hard of hearing) -ear wax removal drops   5. Severe aortic stenosis -hx of. Sees Dr Rome, cardiologist      Plan  Current medications are effective. advised to continue current medications.   F/U with Cardiologist as advised.   Patient was advised to use over the counter wax removal drops 3-4 drops in each ear for 4-5 days each month to  prevent build up of wax .  Patient agreed with plan and felt satisfied  F/U 6 months for medicare wellness as scheduled.

## 2024-10-22 NOTE — PATIENT INSTRUCTIONS
Plan  Current medications are effective. advised to continue current medications.   F/U with Cardiologist as advised.   Patient was advised to use over the counter wax removal drops 3-4 drops in each ear for 4-5 days each month to prevent build up of wax .  Patient agreed with plan and felt satisfied  F/U 6 months for medicare wellness as scheduled.

## 2024-10-30 ENCOUNTER — ANTICOAGULATION - WARFARIN VISIT (OUTPATIENT)
Dept: PHARMACY | Facility: CLINIC | Age: 89
End: 2024-10-30
Payer: MEDICARE

## 2024-10-30 DIAGNOSIS — I48.0 PAROXYSMAL ATRIAL FIBRILLATION (MULTI): ICD-10-CM

## 2024-10-30 DIAGNOSIS — I48.91 ATRIAL FIBRILLATION, UNSPECIFIED TYPE (MULTI): Primary | ICD-10-CM

## 2024-10-30 LAB
POC INR: 2.8
POC PROTHROMBIN TIME: NORMAL

## 2024-10-30 PROCEDURE — 99212 OFFICE O/P EST SF 10 MIN: CPT

## 2024-10-30 PROCEDURE — 85610 PROTHROMBIN TIME: CPT | Mod: QW

## 2024-11-05 ENCOUNTER — LAB (OUTPATIENT)
Dept: LAB | Facility: LAB | Age: 89
End: 2024-11-05
Payer: MEDICARE

## 2024-11-05 DIAGNOSIS — E03.9 HYPOTHYROIDISM, UNSPECIFIED: Primary | ICD-10-CM

## 2024-11-05 DIAGNOSIS — E55.9 VITAMIN D DEFICIENCY, UNSPECIFIED: ICD-10-CM

## 2024-11-05 DIAGNOSIS — E11.9 TYPE 2 DIABETES MELLITUS WITHOUT COMPLICATIONS (MULTI): ICD-10-CM

## 2024-11-05 LAB
25(OH)D3 SERPL-MCNC: 32 NG/ML (ref 30–100)
ALBUMIN SERPL BCP-MCNC: 4.1 G/DL (ref 3.4–5)
ALP SERPL-CCNC: 98 U/L (ref 33–136)
ALT SERPL W P-5'-P-CCNC: 27 U/L (ref 10–52)
ANION GAP SERPL CALC-SCNC: 9 MMOL/L (ref 10–20)
AST SERPL W P-5'-P-CCNC: 27 U/L (ref 9–39)
BILIRUB SERPL-MCNC: 0.8 MG/DL (ref 0–1.2)
BUN SERPL-MCNC: 23 MG/DL (ref 6–23)
CALCIUM SERPL-MCNC: 8.8 MG/DL (ref 8.6–10.3)
CHLORIDE SERPL-SCNC: 108 MMOL/L (ref 98–107)
CHOLEST SERPL-MCNC: 91 MG/DL (ref 0–199)
CHOLESTEROL/HDL RATIO: 2.2
CO2 SERPL-SCNC: 31 MMOL/L (ref 21–32)
CREAT SERPL-MCNC: 1.44 MG/DL (ref 0.5–1.3)
EGFRCR SERPLBLD CKD-EPI 2021: 46 ML/MIN/1.73M*2
EST. AVERAGE GLUCOSE BLD GHB EST-MCNC: 177 MG/DL
GLUCOSE SERPL-MCNC: 106 MG/DL (ref 74–99)
HBA1C MFR BLD: 7.8 %
HDLC SERPL-MCNC: 42 MG/DL
LDLC SERPL CALC-MCNC: 38 MG/DL
LDLC SERPL DIRECT ASSAY-MCNC: 33 MG/DL (ref 0–129)
NON HDL CHOLESTEROL: 49 MG/DL (ref 0–149)
POTASSIUM SERPL-SCNC: 4.6 MMOL/L (ref 3.5–5.3)
PROT SERPL-MCNC: 6.7 G/DL (ref 6.4–8.2)
SODIUM SERPL-SCNC: 143 MMOL/L (ref 136–145)
T4 FREE SERPL-MCNC: 1.39 NG/DL (ref 0.61–1.12)
TRIGL SERPL-MCNC: 56 MG/DL (ref 0–149)
TSH SERPL-ACNC: 4.23 MIU/L (ref 0.44–3.98)
VLDL: 11 MG/DL (ref 0–40)

## 2024-11-05 PROCEDURE — 84439 ASSAY OF FREE THYROXINE: CPT

## 2024-11-05 PROCEDURE — 84443 ASSAY THYROID STIM HORMONE: CPT

## 2024-11-05 PROCEDURE — 80053 COMPREHEN METABOLIC PANEL: CPT

## 2024-11-05 PROCEDURE — 82306 VITAMIN D 25 HYDROXY: CPT

## 2024-11-05 PROCEDURE — 83721 ASSAY OF BLOOD LIPOPROTEIN: CPT

## 2024-11-05 PROCEDURE — 80061 LIPID PANEL: CPT

## 2024-11-05 PROCEDURE — 83036 HEMOGLOBIN GLYCOSYLATED A1C: CPT

## 2024-11-05 PROCEDURE — 36415 COLL VENOUS BLD VENIPUNCTURE: CPT

## 2024-11-15 ENCOUNTER — APPOINTMENT (OUTPATIENT)
Dept: GASTROENTEROLOGY | Facility: CLINIC | Age: 89
End: 2024-11-15
Payer: MEDICARE

## 2024-11-15 VITALS
DIASTOLIC BLOOD PRESSURE: 70 MMHG | SYSTOLIC BLOOD PRESSURE: 137 MMHG | HEIGHT: 69 IN | WEIGHT: 184 LBS | HEART RATE: 55 BPM | BODY MASS INDEX: 27.25 KG/M2

## 2024-11-15 DIAGNOSIS — K80.20 ASYMPTOMATIC CHOLELITHIASIS: ICD-10-CM

## 2024-11-15 DIAGNOSIS — R10.9 ABDOMINAL PAIN, UNSPECIFIED ABDOMINAL LOCATION: ICD-10-CM

## 2024-11-15 DIAGNOSIS — D12.6 COLON ADENOMA: ICD-10-CM

## 2024-11-15 DIAGNOSIS — K22.70 BARRETT'S ESOPHAGUS WITHOUT DYSPLASIA: Primary | ICD-10-CM

## 2024-11-15 DIAGNOSIS — K59.09 CHRONIC CONSTIPATION: ICD-10-CM

## 2024-11-15 DIAGNOSIS — Z11.59 ENCOUNTER FOR SCREENING FOR OTHER VIRAL DISEASES: ICD-10-CM

## 2024-11-15 DIAGNOSIS — R14.0 ABDOMINAL BLOATING: ICD-10-CM

## 2024-11-15 DIAGNOSIS — K58.1 IRRITABLE BOWEL SYNDROME WITH CONSTIPATION: ICD-10-CM

## 2024-11-15 DIAGNOSIS — D64.9 ANEMIA, UNSPECIFIED TYPE: ICD-10-CM

## 2024-11-15 DIAGNOSIS — R12 HEARTBURN: ICD-10-CM

## 2024-11-15 PROCEDURE — 1160F RVW MEDS BY RX/DR IN RCRD: CPT | Performed by: STUDENT IN AN ORGANIZED HEALTH CARE EDUCATION/TRAINING PROGRAM

## 2024-11-15 PROCEDURE — 99214 OFFICE O/P EST MOD 30 MIN: CPT | Performed by: STUDENT IN AN ORGANIZED HEALTH CARE EDUCATION/TRAINING PROGRAM

## 2024-11-15 PROCEDURE — 1159F MED LIST DOCD IN RCRD: CPT | Performed by: STUDENT IN AN ORGANIZED HEALTH CARE EDUCATION/TRAINING PROGRAM

## 2024-11-15 PROCEDURE — 1036F TOBACCO NON-USER: CPT | Performed by: STUDENT IN AN ORGANIZED HEALTH CARE EDUCATION/TRAINING PROGRAM

## 2024-11-15 RX ORDER — POLYETHYLENE GLYCOL 3350 17 G/17G
17 POWDER, FOR SOLUTION ORAL DAILY
Qty: 30 PACKET | Refills: 11 | Status: SHIPPED | OUTPATIENT
Start: 2024-11-15 | End: 2025-11-15

## 2024-11-15 NOTE — PROGRESS NOTES
2021  87-year-old gentleman with history of hiatal hernia, gallstones, colon tubular adenomas, H. pylori status post treatment with repeat biopsy negative as per previous chart, CKD, antral ulcer, active NSAIDs use presenting for follow-up for iron deficiency anemia. The patient was seen previously for abdominal discomfort for 2-month, right periumbilical, occurring when he lies down on his right side, not related to diet or bowel movements, lasting for few minutes, he reports a bulging on the right periumbilical area. CAT scan performed showing only gallstones. Currently patient denies any abdominal pain and denies any other GI symptoms     3/1/2021 patient seen for follow-up, denies any GI complaints, mentions that he still taking iron twice daily, stopped Protonix, still on Coumadin     9/2022  Patient is here for follow-up, feeling well, denies any melena hematochezia or hematemesis, denies any other GI symptoms     3/17/2023  Patient is here for follow-up, feeling well, denies any melena hematochezia or hematemesis, asking for a sleeping pill, I referred him to his primary doctor, hemoglobin 11.4 better than before     9/15/2023  Patient is here for follow-up, feeling well, denies any heartburn, taking Protonix every day, still taking iron supplementation, denies any melena hematochezia or hematemesis.     4/15/2024  Patient is here for follow-up, requesting stool softeners because he had stool have a lot of prunes in order to go to the bathroom, mentions having significant amount of gas    11/16/2024  Patient seen for follow-up, feeling well, mentions having abdominal gas, mentions straining during defecation, having 1-2 bowel movement every day    5/2016 EGD antral ulcers, hiatal hernia  8/2014 colonoscopy for history of tubular adenomas, hemorrhoids, repeat colonoscopy in 5 years  Family history not pertinent to clinical presentation  Normally has 2 bowel movements per day, has a prune daily, normal, no  blood, no weight loss  Denies NSAIDs, social alcohol drinker, denies IV drug use smoking marijuana                  The note was created using voice recognition transcription software. Despite proofreading, unintentional typographical errors may be present. Please contact the GI office with any questions or concerns.     Current Medications: reviewed    A 10 point review of system is negative except for what is mentioned in the HPI    Follow up with GI was advised       Vital Signs: Reviewed    Physical Exam:  General: no apparent distress, pleasant and cooperative  Skin:  Warm and dry, no jaundice  HEENT: No scleral icterus, no conjunctival pallor, normocephalic, atraumatic, mucous membranes moist  Neck:  atraumatic, trachea midline, no JVD  Chest:  decreased air entry to auscultation bilaterally. No wheezes, rales, or rhonchi  CV: Positive heart murmur, regular rate and rhythm.  Positive S1/S2  Abdomen: no distension, +BS, soft, non-tender to palpation, no rebound tenderness, no guarding, no rigidity, no discernible ascites   Extremities: no lower extremity edema, Chronic pigmentary changes, no cyanosis  Neurological:  A&Ox3 , no asterixis  Psychiatric: cooperative     Investigations:  Labs, radiological imaging and cardiac work up were reviewed         1-screen for hepatitis C     2-BMI 27, healthy lifestyle advised     3-Healthcare maintenance, screening colonoscopy not recommended in the patient's age group     4-previous abdominal pain for 2 months currently resolved, right periumbilical, not related to diet or BMs, worse with palpation,CAT scan of the abdomen noncontrast on 10/2/2020 showing gallstones with signifcant stools, currently pain resolved, he stopped taking senna Colace, start MiraLAX daily, Gas-X as needed     5-anemia of chronic disease suggested by iron studies 1/2023, EGD and colonoscopy as above, continue Protonix daily in view of prior history of gastric ulcers, Coumadin intake, advanced age,  previously offered egd and colonoscopy prefers conservative management for now, risks and benefits explained to the patient who agreed and verbalized understanding to conservative management, monitor CBC     6-history of Kraus's 2014, antral ulcer, history of H. pylori status post treatment, repeat biopsies negative as per previous charts, history of NSAIDs use stopped, repeat upper endoscopy offered to the patient however he prefers conservative management as mentioned above, risks and benefits explained as above, continue Protonix daily

## 2024-11-15 NOTE — PATIENT INSTRUCTIONS
1- continue taking Protonix daily so it can protect your stomach, please avoid NSAIDs  2-for your bowel movements please start taking MiraLAX 1 capful daily, you can decrease to half a capful daily if 1 capful was too much

## 2024-12-04 ENCOUNTER — ANTICOAGULATION - WARFARIN VISIT (OUTPATIENT)
Dept: PHARMACY | Facility: CLINIC | Age: 89
End: 2024-12-04
Payer: MEDICARE

## 2024-12-04 DIAGNOSIS — I48.91 ATRIAL FIBRILLATION, UNSPECIFIED TYPE (MULTI): Primary | ICD-10-CM

## 2024-12-04 DIAGNOSIS — I48.0 PAROXYSMAL ATRIAL FIBRILLATION (MULTI): ICD-10-CM

## 2024-12-04 LAB
POC INR: 2.4
POC PROTHROMBIN TIME: NORMAL

## 2024-12-04 PROCEDURE — 99212 OFFICE O/P EST SF 10 MIN: CPT

## 2024-12-04 PROCEDURE — 85610 PROTHROMBIN TIME: CPT | Mod: QW

## 2024-12-27 ENCOUNTER — HOSPITAL ENCOUNTER (INPATIENT)
Facility: HOSPITAL | Age: 89
End: 2024-12-27
Attending: STUDENT IN AN ORGANIZED HEALTH CARE EDUCATION/TRAINING PROGRAM | Admitting: INTERNAL MEDICINE
Payer: MEDICARE

## 2024-12-27 ENCOUNTER — APPOINTMENT (OUTPATIENT)
Dept: RADIOLOGY | Facility: HOSPITAL | Age: 89
End: 2024-12-27
Payer: MEDICARE

## 2024-12-27 ENCOUNTER — APPOINTMENT (OUTPATIENT)
Dept: CARDIOLOGY | Facility: HOSPITAL | Age: 89
End: 2024-12-27
Payer: MEDICARE

## 2024-12-27 DIAGNOSIS — R53.1 GENERALIZED WEAKNESS: ICD-10-CM

## 2024-12-27 DIAGNOSIS — U07.1 COVID-19: ICD-10-CM

## 2024-12-27 DIAGNOSIS — U07.1 COVID: Primary | ICD-10-CM

## 2024-12-27 LAB
ALBUMIN SERPL BCP-MCNC: 4.3 G/DL (ref 3.4–5)
ALP SERPL-CCNC: 100 U/L (ref 33–136)
ALT SERPL W P-5'-P-CCNC: 20 U/L (ref 10–52)
ANION GAP SERPL CALC-SCNC: 11 MMOL/L (ref 10–20)
AST SERPL W P-5'-P-CCNC: 21 U/L (ref 9–39)
BASOPHILS # BLD AUTO: 0.04 X10*3/UL (ref 0–0.1)
BASOPHILS NFR BLD AUTO: 0.5 %
BILIRUB SERPL-MCNC: 1 MG/DL (ref 0–1.2)
BUN SERPL-MCNC: 25 MG/DL (ref 6–23)
CALCIUM SERPL-MCNC: 9.4 MG/DL (ref 8.6–10.3)
CARDIAC TROPONIN I PNL SERPL HS: 23 NG/L (ref 0–20)
CARDIAC TROPONIN I PNL SERPL HS: 26 NG/L (ref 0–20)
CHLORIDE SERPL-SCNC: 100 MMOL/L (ref 98–107)
CO2 SERPL-SCNC: 27 MMOL/L (ref 21–32)
CREAT SERPL-MCNC: 1.56 MG/DL (ref 0.5–1.3)
EGFRCR SERPLBLD CKD-EPI 2021: 42 ML/MIN/1.73M*2
EOSINOPHIL # BLD AUTO: 0.04 X10*3/UL (ref 0–0.4)
EOSINOPHIL NFR BLD AUTO: 0.5 %
ERYTHROCYTE [DISTWIDTH] IN BLOOD BY AUTOMATED COUNT: 14.2 % (ref 11.5–14.5)
FLUAV RNA RESP QL NAA+PROBE: NOT DETECTED
FLUBV RNA RESP QL NAA+PROBE: NOT DETECTED
GLUCOSE BLD MANUAL STRIP-MCNC: 145 MG/DL (ref 74–99)
GLUCOSE SERPL-MCNC: 205 MG/DL (ref 74–99)
HCT VFR BLD AUTO: 34.3 % (ref 41–52)
HGB BLD-MCNC: 11.1 G/DL (ref 13.5–17.5)
IMM GRANULOCYTES # BLD AUTO: 0.03 X10*3/UL (ref 0–0.5)
IMM GRANULOCYTES NFR BLD AUTO: 0.4 % (ref 0–0.9)
INR PPP: 2.2 (ref 0.9–1.1)
LACTATE SERPL-SCNC: 0.9 MMOL/L (ref 0.4–2)
LYMPHOCYTES # BLD AUTO: 0.42 X10*3/UL (ref 0.8–3)
LYMPHOCYTES NFR BLD AUTO: 5.3 %
MAGNESIUM SERPL-MCNC: 1.87 MG/DL (ref 1.6–2.4)
MCH RBC QN AUTO: 30.9 PG (ref 26–34)
MCHC RBC AUTO-ENTMCNC: 32.4 G/DL (ref 32–36)
MCV RBC AUTO: 96 FL (ref 80–100)
MONOCYTES # BLD AUTO: 0.63 X10*3/UL (ref 0.05–0.8)
MONOCYTES NFR BLD AUTO: 8 %
NEUTROPHILS # BLD AUTO: 6.74 X10*3/UL (ref 1.6–5.5)
NEUTROPHILS NFR BLD AUTO: 85.3 %
NRBC BLD-RTO: 0 /100 WBCS (ref 0–0)
PLATELET # BLD AUTO: 127 X10*3/UL (ref 150–450)
POTASSIUM SERPL-SCNC: 4.6 MMOL/L (ref 3.5–5.3)
PROT SERPL-MCNC: 7.5 G/DL (ref 6.4–8.2)
PROTHROMBIN TIME: 24.5 SECONDS (ref 9.8–12.8)
RBC # BLD AUTO: 3.59 X10*6/UL (ref 4.5–5.9)
RSV RNA RESP QL NAA+PROBE: NOT DETECTED
SARS-COV-2 RNA RESP QL NAA+PROBE: DETECTED
SODIUM SERPL-SCNC: 133 MMOL/L (ref 136–145)
WBC # BLD AUTO: 7.9 X10*3/UL (ref 4.4–11.3)

## 2024-12-27 PROCEDURE — 2500000001 HC RX 250 WO HCPCS SELF ADMINISTERED DRUGS (ALT 637 FOR MEDICARE OP): Performed by: STUDENT IN AN ORGANIZED HEALTH CARE EDUCATION/TRAINING PROGRAM

## 2024-12-27 PROCEDURE — 99285 EMERGENCY DEPT VISIT HI MDM: CPT | Mod: 25 | Performed by: STUDENT IN AN ORGANIZED HEALTH CARE EDUCATION/TRAINING PROGRAM

## 2024-12-27 PROCEDURE — 87637 SARSCOV2&INF A&B&RSV AMP PRB: CPT | Performed by: NURSE PRACTITIONER

## 2024-12-27 PROCEDURE — 36415 COLL VENOUS BLD VENIPUNCTURE: CPT | Performed by: NURSE PRACTITIONER

## 2024-12-27 PROCEDURE — 85610 PROTHROMBIN TIME: CPT | Performed by: STUDENT IN AN ORGANIZED HEALTH CARE EDUCATION/TRAINING PROGRAM

## 2024-12-27 PROCEDURE — 84075 ASSAY ALKALINE PHOSPHATASE: CPT | Performed by: NURSE PRACTITIONER

## 2024-12-27 PROCEDURE — 99223 1ST HOSP IP/OBS HIGH 75: CPT | Performed by: INTERNAL MEDICINE

## 2024-12-27 PROCEDURE — 84484 ASSAY OF TROPONIN QUANT: CPT | Performed by: NURSE PRACTITIONER

## 2024-12-27 PROCEDURE — 71046 X-RAY EXAM CHEST 2 VIEWS: CPT

## 2024-12-27 PROCEDURE — 83735 ASSAY OF MAGNESIUM: CPT | Performed by: NURSE PRACTITIONER

## 2024-12-27 PROCEDURE — 93005 ELECTROCARDIOGRAM TRACING: CPT

## 2024-12-27 PROCEDURE — 85025 COMPLETE CBC W/AUTO DIFF WBC: CPT | Performed by: NURSE PRACTITIONER

## 2024-12-27 PROCEDURE — G0378 HOSPITAL OBSERVATION PER HR: HCPCS

## 2024-12-27 PROCEDURE — 83605 ASSAY OF LACTIC ACID: CPT | Performed by: NURSE PRACTITIONER

## 2024-12-27 PROCEDURE — 71046 X-RAY EXAM CHEST 2 VIEWS: CPT | Performed by: RADIOLOGY

## 2024-12-27 PROCEDURE — 2500000001 HC RX 250 WO HCPCS SELF ADMINISTERED DRUGS (ALT 637 FOR MEDICARE OP): Performed by: INTERNAL MEDICINE

## 2024-12-27 PROCEDURE — 82947 ASSAY GLUCOSE BLOOD QUANT: CPT

## 2024-12-27 PROCEDURE — 36415 COLL VENOUS BLD VENIPUNCTURE: CPT | Performed by: STUDENT IN AN ORGANIZED HEALTH CARE EDUCATION/TRAINING PROGRAM

## 2024-12-27 PROCEDURE — 84484 ASSAY OF TROPONIN QUANT: CPT | Performed by: STUDENT IN AN ORGANIZED HEALTH CARE EDUCATION/TRAINING PROGRAM

## 2024-12-27 RX ORDER — DEXTROSE 50 % IN WATER (D50W) INTRAVENOUS SYRINGE
12.5
Status: DISCONTINUED | OUTPATIENT
Start: 2024-12-27 | End: 2024-12-30 | Stop reason: HOSPADM

## 2024-12-27 RX ORDER — ERGOCALCIFEROL 1.25 MG/1
50000 CAPSULE ORAL
COMMUNITY
Start: 2024-12-23

## 2024-12-27 RX ORDER — METRONIDAZOLE 7.5 MG/G
1 CREAM TOPICAL NIGHTLY
COMMUNITY
Start: 2024-07-03

## 2024-12-27 RX ORDER — WARFARIN 2.5 MG/1
2.5 TABLET ORAL
COMMUNITY

## 2024-12-27 RX ORDER — ATORVASTATIN CALCIUM 10 MG/1
10 TABLET, FILM COATED ORAL NIGHTLY
Status: DISCONTINUED | OUTPATIENT
Start: 2024-12-27 | End: 2024-12-30 | Stop reason: HOSPADM

## 2024-12-27 RX ORDER — ACETAMINOPHEN 325 MG/1
650 TABLET ORAL ONCE
Status: COMPLETED | OUTPATIENT
Start: 2024-12-27 | End: 2024-12-27

## 2024-12-27 RX ORDER — INSULIN LISPRO 100 [IU]/ML
0-10 INJECTION, SOLUTION INTRAVENOUS; SUBCUTANEOUS
Status: DISCONTINUED | OUTPATIENT
Start: 2024-12-28 | End: 2024-12-30 | Stop reason: HOSPADM

## 2024-12-27 RX ORDER — DEXTROSE 50 % IN WATER (D50W) INTRAVENOUS SYRINGE
25
Status: DISCONTINUED | OUTPATIENT
Start: 2024-12-27 | End: 2024-12-30 | Stop reason: HOSPADM

## 2024-12-27 RX ORDER — TORSEMIDE 20 MG/1
10 TABLET ORAL DAILY
Status: DISCONTINUED | OUTPATIENT
Start: 2024-12-28 | End: 2024-12-30 | Stop reason: HOSPADM

## 2024-12-27 RX ORDER — ONDANSETRON HYDROCHLORIDE 2 MG/ML
4 INJECTION, SOLUTION INTRAVENOUS EVERY 6 HOURS PRN
Status: DISCONTINUED | OUTPATIENT
Start: 2024-12-27 | End: 2024-12-30 | Stop reason: HOSPADM

## 2024-12-27 RX ORDER — ACETAMINOPHEN 325 MG/1
975 TABLET ORAL EVERY 6 HOURS PRN
Status: DISCONTINUED | OUTPATIENT
Start: 2024-12-27 | End: 2024-12-30 | Stop reason: HOSPADM

## 2024-12-27 RX ORDER — AZELAIC ACID 0.15 G/G
1 GEL TOPICAL EVERY MORNING
COMMUNITY
Start: 2024-04-02

## 2024-12-27 RX ORDER — LEVOTHYROXINE SODIUM 100 UG/1
100 TABLET ORAL
Status: DISCONTINUED | OUTPATIENT
Start: 2024-12-28 | End: 2024-12-30 | Stop reason: HOSPADM

## 2024-12-27 RX ADMIN — ACETAMINOPHEN 650 MG: 325 TABLET, FILM COATED ORAL at 17:30

## 2024-12-27 RX ADMIN — ATORVASTATIN CALCIUM 10 MG: 10 TABLET, FILM COATED ORAL at 22:43

## 2024-12-27 SDOH — SOCIAL STABILITY: SOCIAL INSECURITY: HAS ANYONE EVER THREATENED TO HURT YOUR FAMILY OR YOUR PETS?: NO

## 2024-12-27 SDOH — ECONOMIC STABILITY: INCOME INSECURITY: IN THE PAST 12 MONTHS HAS THE ELECTRIC, GAS, OIL, OR WATER COMPANY THREATENED TO SHUT OFF SERVICES IN YOUR HOME?: NO

## 2024-12-27 SDOH — SOCIAL STABILITY: SOCIAL INSECURITY
WITHIN THE LAST YEAR, HAVE YOU BEEN KICKED, HIT, SLAPPED, OR OTHERWISE PHYSICALLY HURT BY YOUR PARTNER OR EX-PARTNER?: NO

## 2024-12-27 SDOH — ECONOMIC STABILITY: FOOD INSECURITY: WITHIN THE PAST 12 MONTHS, THE FOOD YOU BOUGHT JUST DIDN'T LAST AND YOU DIDN'T HAVE MONEY TO GET MORE.: NEVER TRUE

## 2024-12-27 SDOH — SOCIAL STABILITY: SOCIAL INSECURITY: HAVE YOU HAD ANY THOUGHTS OF HARMING ANYONE ELSE?: NO

## 2024-12-27 SDOH — ECONOMIC STABILITY: FOOD INSECURITY: WITHIN THE PAST 12 MONTHS, YOU WORRIED THAT YOUR FOOD WOULD RUN OUT BEFORE YOU GOT THE MONEY TO BUY MORE.: NEVER TRUE

## 2024-12-27 SDOH — SOCIAL STABILITY: SOCIAL INSECURITY: WITHIN THE LAST YEAR, HAVE YOU BEEN AFRAID OF YOUR PARTNER OR EX-PARTNER?: NO

## 2024-12-27 SDOH — SOCIAL STABILITY: SOCIAL INSECURITY: DO YOU FEEL UNSAFE GOING BACK TO THE PLACE WHERE YOU ARE LIVING?: NO

## 2024-12-27 SDOH — SOCIAL STABILITY: SOCIAL INSECURITY: WITHIN THE LAST YEAR, HAVE YOU BEEN HUMILIATED OR EMOTIONALLY ABUSED IN OTHER WAYS BY YOUR PARTNER OR EX-PARTNER?: NO

## 2024-12-27 SDOH — SOCIAL STABILITY: SOCIAL INSECURITY: DO YOU FEEL ANYONE HAS EXPLOITED OR TAKEN ADVANTAGE OF YOU FINANCIALLY OR OF YOUR PERSONAL PROPERTY?: NO

## 2024-12-27 SDOH — SOCIAL STABILITY: SOCIAL INSECURITY
WITHIN THE LAST YEAR, HAVE YOU BEEN RAPED OR FORCED TO HAVE ANY KIND OF SEXUAL ACTIVITY BY YOUR PARTNER OR EX-PARTNER?: NO

## 2024-12-27 SDOH — SOCIAL STABILITY: SOCIAL INSECURITY: ARE THERE ANY APPARENT SIGNS OF INJURIES/BEHAVIORS THAT COULD BE RELATED TO ABUSE/NEGLECT?: NO

## 2024-12-27 SDOH — SOCIAL STABILITY: SOCIAL INSECURITY: ABUSE: ADULT

## 2024-12-27 SDOH — SOCIAL STABILITY: SOCIAL INSECURITY: WERE YOU ABLE TO COMPLETE ALL THE BEHAVIORAL HEALTH SCREENINGS?: YES

## 2024-12-27 SDOH — SOCIAL STABILITY: SOCIAL INSECURITY: HAVE YOU HAD THOUGHTS OF HARMING ANYONE ELSE?: NO

## 2024-12-27 SDOH — SOCIAL STABILITY: SOCIAL INSECURITY: DOES ANYONE TRY TO KEEP YOU FROM HAVING/CONTACTING OTHER FRIENDS OR DOING THINGS OUTSIDE YOUR HOME?: NO

## 2024-12-27 SDOH — SOCIAL STABILITY: SOCIAL INSECURITY: ARE YOU OR HAVE YOU BEEN THREATENED OR ABUSED PHYSICALLY, EMOTIONALLY, OR SEXUALLY BY ANYONE?: NO

## 2024-12-27 ASSESSMENT — COGNITIVE AND FUNCTIONAL STATUS - GENERAL
DAILY ACTIVITIY SCORE: 24
PATIENT BASELINE BEDBOUND: NO
MOBILITY SCORE: 23
CLIMB 3 TO 5 STEPS WITH RAILING: A LITTLE

## 2024-12-27 ASSESSMENT — COLUMBIA-SUICIDE SEVERITY RATING SCALE - C-SSRS
2. HAVE YOU ACTUALLY HAD ANY THOUGHTS OF KILLING YOURSELF?: NO
6. HAVE YOU EVER DONE ANYTHING, STARTED TO DO ANYTHING, OR PREPARED TO DO ANYTHING TO END YOUR LIFE?: NO
1. IN THE PAST MONTH, HAVE YOU WISHED YOU WERE DEAD OR WISHED YOU COULD GO TO SLEEP AND NOT WAKE UP?: NO

## 2024-12-27 ASSESSMENT — ACTIVITIES OF DAILY LIVING (ADL)
HEARING - RIGHT EAR: DIFFICULTY WITH NOISE
PATIENT'S MEMORY ADEQUATE TO SAFELY COMPLETE DAILY ACTIVITIES?: YES
WALKS IN HOME: NEEDS ASSISTANCE
DRESSING YOURSELF: NEEDS ASSISTANCE
TOILETING: NEEDS ASSISTANCE
HEARING - LEFT EAR: DIFFICULTY WITH NOISE
LACK_OF_TRANSPORTATION: NO
FEEDING YOURSELF: NEEDS ASSISTANCE
BATHING: NEEDS ASSISTANCE
ADEQUATE_TO_COMPLETE_ADL: YES
JUDGMENT_ADEQUATE_SAFELY_COMPLETE_DAILY_ACTIVITIES: YES
GROOMING: NEEDS ASSISTANCE

## 2024-12-27 ASSESSMENT — LIFESTYLE VARIABLES
HAVE PEOPLE ANNOYED YOU BY CRITICIZING YOUR DRINKING: NO
EVER FELT BAD OR GUILTY ABOUT YOUR DRINKING: NO
AUDIT-C TOTAL SCORE: 0
HOW OFTEN DO YOU HAVE 6 OR MORE DRINKS ON ONE OCCASION: NEVER
HOW MANY STANDARD DRINKS CONTAINING ALCOHOL DO YOU HAVE ON A TYPICAL DAY: PATIENT DOES NOT DRINK
AUDIT-C TOTAL SCORE: 0
SKIP TO QUESTIONS 9-10: 1
EVER HAD A DRINK FIRST THING IN THE MORNING TO STEADY YOUR NERVES TO GET RID OF A HANGOVER: NO
HOW OFTEN DO YOU HAVE A DRINK CONTAINING ALCOHOL: NEVER
TOTAL SCORE: 0
HAVE YOU EVER FELT YOU SHOULD CUT DOWN ON YOUR DRINKING: NO

## 2024-12-27 ASSESSMENT — PAIN SCALES - GENERAL
PAINLEVEL_OUTOF10: 0 - NO PAIN
PAINLEVEL_OUTOF10: 7
PAINLEVEL_OUTOF10: 0 - NO PAIN

## 2024-12-27 ASSESSMENT — PATIENT HEALTH QUESTIONNAIRE - PHQ9
SUM OF ALL RESPONSES TO PHQ9 QUESTIONS 1 & 2: 0
2. FEELING DOWN, DEPRESSED OR HOPELESS: NOT AT ALL
1. LITTLE INTEREST OR PLEASURE IN DOING THINGS: NOT AT ALL

## 2024-12-27 NOTE — ED TRIAGE NOTES
Pt presents to ED for report of generalized weakness, body aches, chills, and cough that began yesterday afternoon. Pt denies CP, SOB. Reports discomfort in chest when coughing.

## 2024-12-27 NOTE — ED TRIAGE NOTES
This patient was seen and examined in triage    HPI:  Patient is nontoxic-appearing 89-year-old male who presents the emergency room today for complaint of generalized weakness, cough, generalized bodyaches and pains and low-grade fever.  Patient states cough began yesterday with intermittent production.  Patient denies any shortness of breath difficulty breathing, chest pain, abdominal pain, urinary complaints, nausea, vomiting, diarrhea or constipation.  Patient denies any contact with known ill dividual's or recent travel.    Focused PE:  Gen: Well-appearing, not in acute distress  Cardiovascular: Regular rate, normal rhythm, no murmur, no gallop  Respiratory: No adventitious lung sounds auscultated.  Abdomen: No reproducible abdominal tenderness upon palpation,  physical exam may be limited by patient positioning sitting up in a chair  Neuro:  Alert and Oriented, speech clear and coherent    Plan:  Lab work was ordered from triage including EKG and chest x-ray    For the remainder the patient's work-up and ED course, please see the main ED provider note.  We discussed need for diagnostic testing including lab studies and imaging.  We also discussed that they may be asked to wait in the waiting room while these test are pending.  They understand that if they choose to leave without having the testing completed or resulted that we cannot rule out acute life-threatening illnesses and the risks involved to lead to worsening condition, permanent disability or even death.  Alden Moreno APRN-CNP     Portions of this note were generated using digital voice recognition software, and may contain grammatical errors

## 2024-12-28 PROBLEM — U07.1 COVID: Status: ACTIVE | Noted: 2024-12-28

## 2024-12-28 LAB
ANION GAP SERPL CALC-SCNC: 10 MMOL/L (ref 10–20)
BUN SERPL-MCNC: 22 MG/DL (ref 6–23)
CALCIUM SERPL-MCNC: 8.7 MG/DL (ref 8.6–10.3)
CHLORIDE SERPL-SCNC: 103 MMOL/L (ref 98–107)
CO2 SERPL-SCNC: 25 MMOL/L (ref 21–32)
CREAT SERPL-MCNC: 1.38 MG/DL (ref 0.5–1.3)
EGFRCR SERPLBLD CKD-EPI 2021: 48 ML/MIN/1.73M*2
ERYTHROCYTE [DISTWIDTH] IN BLOOD BY AUTOMATED COUNT: 14.3 % (ref 11.5–14.5)
GLUCOSE BLD MANUAL STRIP-MCNC: 121 MG/DL (ref 74–99)
GLUCOSE BLD MANUAL STRIP-MCNC: 165 MG/DL (ref 74–99)
GLUCOSE BLD MANUAL STRIP-MCNC: 175 MG/DL (ref 74–99)
GLUCOSE BLD MANUAL STRIP-MCNC: 221 MG/DL (ref 74–99)
GLUCOSE SERPL-MCNC: 171 MG/DL (ref 74–99)
HCT VFR BLD AUTO: 29.9 % (ref 41–52)
HGB BLD-MCNC: 9.7 G/DL (ref 13.5–17.5)
INR PPP: 2 (ref 0.9–1.1)
MCH RBC QN AUTO: 30.4 PG (ref 26–34)
MCHC RBC AUTO-ENTMCNC: 32.4 G/DL (ref 32–36)
MCV RBC AUTO: 94 FL (ref 80–100)
NRBC BLD-RTO: 0 /100 WBCS (ref 0–0)
PLATELET # BLD AUTO: 110 X10*3/UL (ref 150–450)
POTASSIUM SERPL-SCNC: 4.1 MMOL/L (ref 3.5–5.3)
PROTHROMBIN TIME: 23.1 SECONDS (ref 9.8–12.8)
RBC # BLD AUTO: 3.19 X10*6/UL (ref 4.5–5.9)
SODIUM SERPL-SCNC: 134 MMOL/L (ref 136–145)
WBC # BLD AUTO: 4.6 X10*3/UL (ref 4.4–11.3)

## 2024-12-28 PROCEDURE — 97161 PT EVAL LOW COMPLEX 20 MIN: CPT | Mod: GP

## 2024-12-28 PROCEDURE — 82947 ASSAY GLUCOSE BLOOD QUANT: CPT

## 2024-12-28 PROCEDURE — 2500000002 HC RX 250 W HCPCS SELF ADMINISTERED DRUGS (ALT 637 FOR MEDICARE OP, ALT 636 FOR OP/ED): Performed by: INTERNAL MEDICINE

## 2024-12-28 PROCEDURE — 2500000001 HC RX 250 WO HCPCS SELF ADMINISTERED DRUGS (ALT 637 FOR MEDICARE OP): Performed by: INTERNAL MEDICINE

## 2024-12-28 PROCEDURE — 85610 PROTHROMBIN TIME: CPT | Performed by: INTERNAL MEDICINE

## 2024-12-28 PROCEDURE — 97165 OT EVAL LOW COMPLEX 30 MIN: CPT | Mod: GO

## 2024-12-28 PROCEDURE — 1200000002 HC GENERAL ROOM WITH TELEMETRY DAILY

## 2024-12-28 PROCEDURE — 85027 COMPLETE CBC AUTOMATED: CPT | Performed by: INTERNAL MEDICINE

## 2024-12-28 PROCEDURE — 99233 SBSQ HOSP IP/OBS HIGH 50: CPT | Performed by: INTERNAL MEDICINE

## 2024-12-28 PROCEDURE — 2500000004 HC RX 250 GENERAL PHARMACY W/ HCPCS (ALT 636 FOR OP/ED): Performed by: INTERNAL MEDICINE

## 2024-12-28 PROCEDURE — 36415 COLL VENOUS BLD VENIPUNCTURE: CPT | Performed by: INTERNAL MEDICINE

## 2024-12-28 PROCEDURE — 80048 BASIC METABOLIC PNL TOTAL CA: CPT | Performed by: INTERNAL MEDICINE

## 2024-12-28 PROCEDURE — XW033E5 INTRODUCTION OF REMDESIVIR ANTI-INFECTIVE INTO PERIPHERAL VEIN, PERCUTANEOUS APPROACH, NEW TECHNOLOGY GROUP 5: ICD-10-PCS | Performed by: INTERNAL MEDICINE

## 2024-12-28 RX ADMIN — INSULIN LISPRO 2 UNITS: 100 INJECTION, SOLUTION INTRAVENOUS; SUBCUTANEOUS at 08:19

## 2024-12-28 RX ADMIN — LEVOTHYROXINE SODIUM 100 MCG: 0.1 TABLET ORAL at 05:02

## 2024-12-28 RX ADMIN — REMDESIVIR 200 MG: 100 INJECTION, POWDER, LYOPHILIZED, FOR SOLUTION INTRAVENOUS at 11:39

## 2024-12-28 RX ADMIN — INSULIN LISPRO 4 UNITS: 100 INJECTION, SOLUTION INTRAVENOUS; SUBCUTANEOUS at 18:29

## 2024-12-28 RX ADMIN — TORSEMIDE 10 MG: 20 TABLET ORAL at 08:20

## 2024-12-28 RX ADMIN — INSULIN LISPRO 2 UNITS: 100 INJECTION, SOLUTION INTRAVENOUS; SUBCUTANEOUS at 11:37

## 2024-12-28 RX ADMIN — ATORVASTATIN CALCIUM 10 MG: 10 TABLET, FILM COATED ORAL at 20:17

## 2024-12-28 ASSESSMENT — COGNITIVE AND FUNCTIONAL STATUS - GENERAL
MOVING FROM LYING ON BACK TO SITTING ON SIDE OF FLAT BED WITH BEDRAILS: A LITTLE
PERSONAL GROOMING: A LITTLE
WALKING IN HOSPITAL ROOM: A LITTLE
EATING MEALS: A LITTLE
MOBILITY SCORE: 18
DRESSING REGULAR LOWER BODY CLOTHING: A LITTLE
TURNING FROM BACK TO SIDE WHILE IN FLAT BAD: A LITTLE
MOVING TO AND FROM BED TO CHAIR: A LITTLE
EATING MEALS: A LITTLE
DRESSING REGULAR LOWER BODY CLOTHING: A LITTLE
DRESSING REGULAR UPPER BODY CLOTHING: A LITTLE
WALKING IN HOSPITAL ROOM: A LITTLE
DAILY ACTIVITIY SCORE: 18
DAILY ACTIVITIY SCORE: 18
DRESSING REGULAR LOWER BODY CLOTHING: A LITTLE
MOVING TO AND FROM BED TO CHAIR: A LITTLE
TURNING FROM BACK TO SIDE WHILE IN FLAT BAD: A LITTLE
STANDING UP FROM CHAIR USING ARMS: A LITTLE
STANDING UP FROM CHAIR USING ARMS: A LITTLE
MOBILITY SCORE: 18
HELP NEEDED FOR BATHING: A LITTLE
CLIMB 3 TO 5 STEPS WITH RAILING: A LITTLE
WALKING IN HOSPITAL ROOM: A LITTLE
CLIMB 3 TO 5 STEPS WITH RAILING: A LITTLE
TURNING FROM BACK TO SIDE WHILE IN FLAT BAD: A LITTLE
MOVING FROM LYING ON BACK TO SITTING ON SIDE OF FLAT BED WITH BEDRAILS: A LITTLE
STANDING UP FROM CHAIR USING ARMS: A LITTLE
MOVING TO AND FROM BED TO CHAIR: A LITTLE
DRESSING REGULAR UPPER BODY CLOTHING: A LITTLE
CLIMB 3 TO 5 STEPS WITH RAILING: A LITTLE
MOVING FROM LYING ON BACK TO SITTING ON SIDE OF FLAT BED WITH BEDRAILS: A LITTLE
TOILETING: A LITTLE
DRESSING REGULAR UPPER BODY CLOTHING: A LITTLE
MOBILITY SCORE: 18
TOILETING: A LITTLE
HELP NEEDED FOR BATHING: A LITTLE
HELP NEEDED FOR BATHING: A LITTLE
PERSONAL GROOMING: A LITTLE
DAILY ACTIVITIY SCORE: 20
TOILETING: A LITTLE

## 2024-12-28 ASSESSMENT — PAIN SCALES - GENERAL
PAINLEVEL_OUTOF10: 0 - NO PAIN

## 2024-12-28 ASSESSMENT — PAIN - FUNCTIONAL ASSESSMENT: PAIN_FUNCTIONAL_ASSESSMENT: 0-10

## 2024-12-28 NOTE — H&P
"                                             Hospital Medicine History & Physical       Patient Name: Saul Asif   YOB: 1933    Subjective:    Saul Asif is a 89 y.o. male who presents to the hospital with complaints of generalized weakness and flu like symptoms. Has had subjective fevers and non productive cough. Breathing is okay. Found to be positive for COVID 19. Not needing O2.     A 10 point ROS was completed and is negative expect as stated in HPI.     Past Medical History:  Atrial Fibrillation   DM II  Hypothyroidism   CKD III  PUD  Hx H pylori   HLD  Severe aortic value stenosis  Severe mitral value regurgitation   Skin cancer   Prostate CA s/p radiation     Past Surgical History:  MOHS  Endoscopy/Colonoscopy  L TKR     Social History: Tobacco - Never, Alcohol - none, Recreational Drugs - none    Family History: family history includes No Known Problems in his father and mother.     Objective:    /61   Pulse 66   Temp 37.1 °C (98.8 °F)   Resp 16   Ht 1.753 m (5' 9\")   Wt 68 kg (150 lb)   SpO2 97%   BMI 22.15 kg/m²     Physical Exam:    GENERAL: non-toxic, NAD, alert & cooperative  HEENT: normocephalic, atraumatic, sclera clear  CARDIAC: regular rate & rhythm, S1/S2 + SM  PULMONARY: CTA b/l, no respiratory distress, - wheezes  ABDOMEN: soft, non-tender, non-distended  MSK: no peripheral edema, no obvious deformity  NEURO: non-focal, CN II-XII grossly intact  SKIN: Warm and dry, no lesions, no rashes.  PSYCH: appropriate mood & affect     Assessment/Plan:    COVID 19  Atrial Fibrillation   DM II  Hypothyroidism   CKD III  PUD  Hx H pylori   HLD  Severe aortic value stenosis  Severe mitral value regurgitation       On room air and hemodynamically stable. Will order Paxlovid is available. Otherwise treatment will be supportive.   Reconcile home meds      DVT Prophylaxis: on Coumadin   Code Status: FULL CODE. Per PCP note has advanced directives but patient is unsure. Clarify with " POA in AM.   Disposition: Med Surg     Abimael White, DO  Hospital Medicine+

## 2024-12-28 NOTE — PROGRESS NOTES
Physical Therapy    Physical Therapy Evaluation    Patient Name: Saul Asif  MRN: [id number]  Today's Date: 12/28/2024   Time Calculation  Start Time: 1257  Stop Time: 1312  Time Calculation (min): 15 min  320/320-A    Assessment/Plan   PT Assessment  PT Assessment Results: Decreased strength, Decreased endurance, Decreased mobility, Impaired balance, Decreased safety awareness, Impaired hearing  Rehab Prognosis: Good  Evaluation/Treatment Tolerance: Patient limited by fatigue  Medical Staff Made Aware: Yes  Assessment Comment: Pt presents /c above impairments and decline from functional baseline 2nd acute medical status /c deconditioning and altered mobility patterns. Pt will benefit from continued PT services at low intensity /c initial 24hr supervision to address above and maximize functional mobility.  End of Session Patient Position: Bed, 2 rail up, Alarm on  IP OR SWING BED PT PLAN  Inpatient or Swing Bed: Inpatient  PT Plan  Treatment/Interventions: Bed mobility, Transfer training, Balance training, Neuromuscular re-education, Strengthening, Endurance training, Therapeutic exercise, Therapeutic activity, Stair training, Gait training  PT Plan: Ongoing PT  PT Frequency: 3 times per week  PT Discharge Recommendations: Low intensity level of continued care, 24 hr supervision due to cognition  PT - OK to Discharge: Yes    Subjective     Current Problem:  1. COVID        2. Generalized weakness          Patient Active Problem List   Diagnosis    Abdominal bloating    Abdominal pain    Abnormal chest xray    Acquired hypothyroidism    Asymptomatic cholelithiasis    Atrial fibrillation (Multi)    Kraus's esophagus    Diabetes mellitus (Multi)    Gastric ulcer    Helicobacter pylori (H. pylori) infection    HH (hiatus hernia)    History of colon polyps    Hyperlipidemia    Iron deficiency anemia    Irritable bowel syndrome    Leg joint pain    Severe aortic stenosis    Muscular pain    Personal history of  Helicobacter infection    Pneumonia    Presence of artificial knee joint    Right hip pain    Tubular adenoma    Stage 4 chronic kidney disease (Multi)    Paroxysmal atrial fibrillation (Multi)    Prostate cancer (Multi)    Squamous cell cancer of skin of left cheek    Secondary hyperparathyroidism of renal origin (Multi)    COVID-19    COVID       General Visit Information:  General  Reason for Referral: PT eval and treat  Referred By: Christopher  Past Medical History Relevant to Rehab: Afib, DM, CKD, HLD, hypothyroidism, prostate & skin Ca  Co-Treatment: OT  Co-Treatment Reason: possible two person assist, maximize functional mobility  Prior to Session Communication: Bedside nurse  Patient Position Received:  (exiting BR)  General Comment: Pt presents with weakness, aches/chills/cough. CXR: cardiomegaly. +COVID. Pt cleared for therapy by nursing. Pt exiting BR, agreeable to session.    Home Living:  Home Living  Home Living Comments: Pt lives home alone in lower level duplex. Unclear how many steps to enter. Reports having family/friends for support, but does not elaborate.    Prior Level of Function:  Prior Function Per Pt/Caregiver Report  Prior Function Comments: States being indep /c ADL/IADLs. Drives. Denies falls. Access to WW, but only uses cane at baseline.    Precautions:  Precautions  Precautions Comment: falls, safety, +covid isolation       Objective     Pain:  Pain Assessment  Pain Assessment: 0-10  0-10 (Numeric) Pain Score: 0 - No pain    Cognition:  Cognition  Orientation Level:  (A&Ox3, follows commands appropriately, but needing increased safety cues)    General Assessments:  General Observation  General Observation: activity orders: ambulate   Activity Tolerance  Endurance: Tolerates less than 10 min exercise, no significant change in vital signs  Sensation  Sensation Comment: denies n/t  Strength  Strength Comments: BLE 4/5           Dynamic Sitting Balance  Dynamic Sitting-Comments: G  Dynamic  Standing Balance  Dynamic Standing-Comments: F+    Functional Assessments:     Bed Mobility  Bed Mobility: Yes  Bed Mobility 1  Bed Mobility Comments 1: Supine<>Sit /c SBA  Transfers  Transfer: Yes  Transfer 1  Trials/Comments 1: Sit<>Stand /c SBA  Ambulation/Gait Training  Ambulation/Gait Training Performed:  (Pt ambulates 10'x3. Initially /s AD, further trials /c personal cane and CGA-SBA. Wide GORDY, step through gait, slight lateral lean, but no overt LOB. May trial WW at f/u.)          Outcome Measures:     [facility] Basic Mobility  Turning from your back to your side while in a flat bed without using bedrails: A little  Moving from lying on your back to sitting on the side of a flat bed without using bedrails: A little  Moving to and from bed to chair (including a wheelchair): A little  Standing up from a chair using your arms (e.g. wheelchair or bedside chair): A little  To walk in hospital room: A little  Climbing 3-5 steps with railing: A little  Basic Mobility - Total Score: 18                    Goals:  Encounter Problems       Encounter Problems (Active)       PT Problem       STG - Pt will transition supine <> sitting with mod I (Progressing)       Start:  12/28/24    Expected End:  01/11/25            STG - Pt will transfer STS with mod I (Progressing)       Start:  12/28/24    Expected End:  01/11/25            STG - Pt will amb 125' using LRAD with mod I (Progressing)       Start:  12/28/24    Expected End:  01/11/25            STG -  Pt will navigate >/=4 stairs using rail with supervision (Progressing)       Start:  12/28/24    Expected End:  01/11/25                 Education Documentation  Mobility Training, taught by Tracy Whyte, PT at 12/28/2024  2:55 PM.  Learner: Patient  Readiness: Acceptance  Method: Explanation  Response: Verbalizes Understanding, Needs Reinforcement    Education Comments  No comments found.

## 2024-12-28 NOTE — CARE PLAN
The patient's goals for the shift include      The clinical goals for the shift include Patient will remain safe and free from falls through the shift.    Over the shift, the patient did make progress toward the following goals.

## 2024-12-28 NOTE — PROGRESS NOTES
Saul Asif is a 89 y.o. male on day 0 of admission presenting with COVID-19.      Subjective   Patient was seen and examined at bedside, he was accompanied by his daughter and his granddaughter.  He was not in any acute distress.  A long discussion with the family about the patient's case and the next step of discharge planning. Denies any f/c, n/v, new onset headaches, vision changes, chest pain, dyspnea, abdominal pain, changes in BM, or urinary changes.         Objective     Last Recorded Vitals  /60 (BP Location: Right arm, Patient Position: Lying)   Pulse 72   Temp 36.7 °C (98.1 °F) (Temporal)   Resp 18   Wt 68 kg (150 lb)   SpO2 95%   Intake/Output last 3 Shifts:    Intake/Output Summary (Last 24 hours) at 12/28/2024 1656  Last data filed at 12/28/2024 1347  Gross per 24 hour   Intake --   Output 700 ml   Net -700 ml       Admission Weight  Weight: 68 kg (150 lb) (12/27/24 1225)    Daily Weight  12/27/24 : 68 kg (150 lb)    Image Results  XR chest 2 views  Narrative: Interpreted By:  Maycol Baca,   STUDY:  XR CHEST 2 VIEWS      INDICATION:  Signs/Symptoms:Cough.      COMPARISON:  November 29, 2021      ACCESSION NUMBER(S):  [id number]      ORDERING CLINICIAN:  BERNARD OLIVARES      FINDINGS:  Cardiomegaly. Tiny pleural effusions suggested. No renetta edema. No  consolidation.      Impression: Cardiomegaly with suspected tiny pleural effusions. No evidence of  other acute findings.      Signed by: Maycol Baca 12/27/2024 3:25 PM  Dictation workstation:   [id number]      Physical Exam  Constitutional:       Appearance: Normal appearance. He is normal weight.   HENT:      Head: Normocephalic and atraumatic.      Right Ear: Tympanic membrane normal.      Left Ear: Tympanic membrane normal.      Nose: Nose normal.      Mouth/Throat:      Mouth: Mucous membranes are moist.      Pharynx: Oropharynx is clear.   Eyes:      General: No scleral icterus.        Right eye: No discharge.         Left eye: No  discharge.      Extraocular Movements: Extraocular movements intact.      Conjunctiva/sclera: Conjunctivae normal.      Pupils: Pupils are equal, round, and reactive to light.   Neck:      Vascular: No carotid bruit.   Cardiovascular:      Rate and Rhythm: Normal rate and regular rhythm.      Pulses: Normal pulses.      Heart sounds: No murmur heard.     No friction rub. No gallop.   Pulmonary:      Effort: Pulmonary effort is normal.      Breath sounds: Normal breath sounds.   Abdominal:      General: Bowel sounds are normal. There is no distension.      Palpations: Abdomen is soft. There is no mass.      Tenderness: There is no abdominal tenderness. There is no right CVA tenderness, left CVA tenderness, guarding or rebound.      Hernia: No hernia is present.   Musculoskeletal:         General: No swelling, tenderness, deformity or signs of injury.      Cervical back: Normal range of motion and neck supple. No rigidity or tenderness.      Right lower leg: No edema.      Left lower leg: No edema.   Lymphadenopathy:      Cervical: No cervical adenopathy.   Skin:     General: Skin is warm.      Coloration: Skin is not jaundiced or pale.      Findings: No bruising, erythema, lesion or rash.   Neurological:      General: No focal deficit present.      Mental Status: He is alert and oriented to person, place, and time. Mental status is at baseline.   Psychiatric:         Mood and Affect: Mood normal.         Behavior: Behavior normal.         Thought Content: Thought content normal.         Judgment: Judgment normal.         Relevant Results               Assessment/Plan          Assessment & Plan  COVID-19    COVID  COVID 19  Atrial Fibrillation   DM II  Hypothyroidism   CKD III  PUD  Hx H pylori   HLD  Severe aortic value stenosis  Severe mitral value regurgitation         On room air and hemodynamically stable.  Started on Remdisvir with supportive supportive.   Reconcile home meds  PT/OT; scores 18 and 20  respectively, will reassess in the morning , may need HHC        DVT Prophylaxis: on Coumadin   Code Status: FULL CODE. Per PCP note has advanced directives but patient is unsure.  Will Clarify with POA tomorrow  Disposition: Likely discharge tomorrow if remains HD stable and may need HHC                Gala Campo, DO

## 2024-12-28 NOTE — PROGRESS NOTES
Occupational Therapy    Evaluation    Patient Name: Saul Asif  MRN: [id number]  Department: [facility]  Room: [address] [place] [address]  Today's Date: 12/28/2024  Time Calculation  Start Time: 1257  Stop Time: 1312  Time Calculation (min): 15 min        Assessment:  OT Assessment: OT Eval complete. Pt presents with deficits in balance, strength, endurance, safety awareness which decreases independence in ADL task completion and transfers/mobility required for safe DC home. Recommend low intensity OT services to address previously listed deficits with 24/7 assistance.  Prognosis: Fair  Barriers to Discharge Home: Cognition needs, Caregiver assistance, Physical needs  Caregiver Assistance: Patient lives alone and/or does not have reliable caregiver assistance, Caregiver assistance needed per identified barriers - however, level of patient's required assistance exceeds assistance available at home  Cognition Needs: Insight of patient limited regarding functional ability/needs, Cognition-related high falls risk  Physical Needs: Stair navigation into home limited by function/safety, Stair navigation to access bath limited by function/safety, Ambulating household distances limited by function/safety, High falls risk due to function or environment, Intermittent ADL assistance needed  Evaluation/Treatment Tolerance: Patient tolerated treatment well  End of Session Communication: Bedside nurse  End of Session Patient Position: Bed, 2 rail up, Alarm on  OT Assessment Results: Decreased safe judgment during ADL, Decreased cognition, Decreased endurance, Decreased functional mobility, Decreased ADL status  Prognosis: Fair  Barriers to Discharge: Inaccessible home environment, Decreased caregiver support  Evaluation/Treatment Tolerance: Patient tolerated treatment well  Strengths: Coping skills  Barriers to Participation: Comorbidities  Plan:  Treatment Interventions: ADL retraining, Functional transfer training, UE strengthening/ROM, Endurance  "training, Cognitive reorientation, Patient/family training, Equipment evaluation/education, Compensatory technique education, Continued evaluation  OT Frequency: 3 times per week  OT Discharge Recommendations: Low intensity level of continued care, 24 hr supervision due to cognition  Treatment Interventions: ADL retraining, Functional transfer training, UE strengthening/ROM, Endurance training, Cognitive reorientation, Patient/family training, Equipment evaluation/education, Compensatory technique education, Continued evaluation    Subjective   Current Problem:  1. COVID        2. Generalized weakness          General:  General  Reason for Referral: Decline in ADL functioning and functional transfers/mobility  Referred By: Christopher  Past Medical History Relevant to Rehab: A-fib, HLD, hypothyroidism, DM, CKD, prostrate/skin CA  Co-Treatment: PT  Co-Treatment Reason: to safely assess pt deficits  Prior to Session Communication: Bedside nurse  Patient Position Received:  (present in bathroom)  General Comment: Pt to ED from home for weakness, aches/chills. Found to have cardiomegaly via CXR with new onset of COVID.  Precautions:  Medical Precautions: Fall precautions    Vital Sign (Past 2hrs)                 Pain:  Pain Assessment  0-10 (Numeric) Pain Score: 0 - No pain    Objective   Cognition:  Memory: Exceptions to WFL (needs further assessment; grossly decreased)           Home Living:  Type of Home: House  Lives With: Alone  Home Adaptive Equipment: Cane  Home Layout: Two level (walk-in shower in basement)  Home Access: Stairs to enter with rails  Entrance Stairs-Number of Steps:  (\" alot\")  Bathroom Shower/Tub: Walk-in shower  Prior Function:  Level of Edgar: Independent with ADLs and functional transfers, Independent with homemaking with ambulation  Receives Help From: Family  Prior Function Comments: Pt relayed family can assist as needed however unsure of level of support available. Pt reports he is a " current  and is independent for all ADL/IADLs. Pt ambulated with SPC.  IADL History:     ADL:  LE Dressing Assistance: Moderate (min-mod A)  LE Dressing Deficit: Supervision/safety, Don/doff L shoe, Don/doff R shoe  Toileting Assistance with Device: Stand by  Toileting Deficit:  (Pt in bathroom upon arrival to [facility]. Pt ambulated without device from bathroom door to bed unassisted with moderate unsteadiness observed. Pt attempted to be re-directed from doorway however is very [place]. Pt able to safely be assisted to EOB with therapy)  Activity Tolerance:     Bed Mobility/Transfers: Bed Mobility  Bed Mobility:  (Pt required 2 person assist for scooting towards HOB however was able to transfer EOB-SUP with SBA.)    Transfers  Transfer: Yes (SBA required for STS transfers from EOB to standing with heavy retro lean and decreased safety awareness.)      Functional Mobility:  Functional Mobility  Functional Mobility Performed: Yes (Pt ambulated with CGA in room with SPC with VCs for pace/technqiue.)     Sensation:  Sensation Comment: Pt denies n/t in BUE/BLE  Strength:  Strength Comments: BUE ROM WFL; BUE strength 4-/5 grossly    Outcome Measures:[facility] Daily Activity  Putting on and taking off regular lower body clothing: A little  Bathing (including washing, rinsing, drying): A little  Putting on and taking off regular upper body clothing: A little  Toileting, which includes using toilet, bedpan or urinal: A little  Taking care of personal grooming such as brushing teeth: None  Eating Meals: None  Daily Activity - Total Score: 20        Education Documentation  ADL Training, taught by Loren Méndez OT at 12/28/2024  1:41 PM.  Learner: Patient  Readiness: Eager  Method: Demonstration  Response: Verbalizes Understanding, Needs Reinforcement    Education Comments  No comments found.        OP EDUCATION:       Goals:  Encounter Problems       Encounter Problems (Active)       OT Goals       Pt will transfer independently  to/from toilet safely without Vcs for safety or technique.        Start:  12/28/24    Expected End:  01/11/25            Pt will complete LB Dressing tasks independently without LOB or Vcs for safety.        Start:  12/28/24    Expected End:  01/11/25            Pt will tolerate standing for >5-minutes without LOB or seated rest breaks to increase endurance required for ADLs.        Start:  12/28/24    Expected End:  01/11/25            OT Goal 4       Start:  12/28/24    Expected End:  01/11/25

## 2024-12-28 NOTE — ASSESSMENT & PLAN NOTE
COVID 19  Atrial Fibrillation   DM II  Hypothyroidism   CKD III  PUD  Hx H pylori   HLD  Severe aortic value stenosis  Severe mitral value regurgitation         On room air and hemodynamically stable.  Started on Remdisvir with supportive supportive.   Reconcile home meds  PT/OT; scores 18 and 20 respectively, will reassess in the morning , may need HHC        DVT Prophylaxis: on Coumadin   Code Status: FULL CODE. Per PCP note has advanced directives but patient is unsure.  Will Clarify with POA tomorrow  Disposition: Likely discharge tomorrow if remains HD stable and may need HHC

## 2024-12-28 NOTE — ED PROVIDER NOTES
EMERGENCY DEPARTMENT ENCOUNTER      Pt Name: Saul Asif  MRN: [id number]  Birthdate 3/30/1933  Date of evaluation: 12/27/2024  Provider: Drew Valiente DO    CHIEF COMPLAINT       Chief Complaint   Patient presents with    Weakness, Gen    Cough       HISTORY OF PRESENT ILLNESS    Saul Asif is a 89 y.o. male who presents to the emergency department with Family for generalized weakness as well as nonproductive cough.  Patient has had intermittent fevers well-controlled with Tylenol and Motrin.  He states he began having bodyaches yesterday remainder of symptoms followed.  He denies any respiratory disease or history of tobacco use.  Due to the generalized weakness and living home alone he is presenting today for further evaluation.  Uncertain of any sick contacts.          Nursing Notes were reviewed.    REVIEW OF SYSTEMS     CONSTITUTIONAL: Endorses fever.  Denies, sweats, chills.   NEURO: Generalized weakness.  Denies difficulty walking, numbness, tingling, headache.   HEENT: Denies sore throat, rhinorrhea, changes in vision.   CARDIO: Denies chest pain, palpitations.  PULM: Endorses cough.  Denies shortness of breath  GI: Denies abdominal pain, nausea, vomiting, diarrhea, constipation, melena, hematochezia.  : Denies painful urination, frequency, hematuria.   MSK: Denies recent trauma.   SKIN: Denies rash, lesions.   ENDOCRINE: Denies unexpected weight-loss.   HEME: Denies bleeding disorder.     PAST MEDICAL HISTORY     Past Medical History:   Diagnosis Date    Diabetes mellitus (Multi)        SURGICAL HISTORY     History reviewed. No pertinent surgical history.    ALLERGIES     Patient has no known allergies.    FAMILY HISTORY       Family History   Problem Relation Name Age of Onset    No Known Problems Mother      No Known Problems Father          SOCIAL HISTORY       Social History     Socioeconomic History    Marital status: Single   Tobacco Use    Smoking status: Former     Types: Cigarettes     Smokeless tobacco: Never       PHYSICAL EXAM   VS: As documented in the triage note from today's date and EMR flowsheet were reviewed.  Gen: Well developed. No acute distress. Seated in bed. Appears nontoxic.  Alert and oriented to person time place.  Skin: Warm. Dry. Intact. No rashes or lesions.  Eyes: Pupils equally round and reactive to light. Clear sclera.   HENT: Atraumatic appearance. Mucosal membranes moist. No oral lesions, uvula midline, airway patent.   CV: Regular rate and regular rhythm. S1, S2. No pedal edema. Warm extremities.  Resp: Nonlabored breathing Clear to auscultation bilaterally. No increased work of breathing.  Saturating appropriately on room air.  GI: Soft and nontender. No rebound or guarding. Bowel sounds x4 present.   MSK: Symmetric muscle bulk. No joint swelling in the extremities. Compartments are soft. Neurovascularly intact x4 extremities. Radial pulses +2 equal bilaterally.  Pedal pulses +2 equal bilaterally.  Neuro: Alert. Speech fluent. Moving all extremities. No focal deficits. Gait normal.  Psych: Appropriate. Kempt.    DIAGNOSTIC RESULTS   RADIOLOGY:   Non-plain film images such as CT, Ultrasound and MRI are read by the radiologist. Plain radiographic images are visualized and preliminarily interpreted by the emergency physician with the below findings: Chest x-ray no evidence of pneumonia superimposed.      Interpretation per the Radiologist below, if available at the time of this note:    XR chest 2 views   Final Result   Cardiomegaly with suspected tiny pleural effusions. No evidence of   other acute findings.        Signed by: Maycol Baca 12/27/2024 3:25 PM   Dictation workstation:   [id number]            ED BEDSIDE ULTRASOUND:   Performed by ED Physician - none    LABS:  Labs Reviewed   CBC WITH AUTO DIFFERENTIAL - Abnormal       Result Value    WBC 7.9      nRBC 0.0      RBC 3.59 (*)     Hemoglobin 11.1 (*)     Hematocrit 34.3 (*)     MCV 96      MCH 30.9      MCHC  32.4      RDW 14.2      Platelets 127 (*)     Neutrophils % 85.3      Immature Granulocytes %, Automated 0.4      Lymphocytes % 5.3      Monocytes % 8.0      Eosinophils % 0.5      Basophils % 0.5      Neutrophils Absolute 6.74 (*)     Immature Granulocytes Absolute, Automated 0.03      Lymphocytes Absolute 0.42 (*)     Monocytes Absolute 0.63      Eosinophils Absolute 0.04      Basophils Absolute 0.04     TROPONIN I, HIGH SENSITIVITY - Abnormal    Troponin I, High Sensitivity 23 (*)     Narrative:     Less than 99th percentile of normal range cutoff-  Female and children under 18 years old <14 ng/L; Male <21 ng/L: Negative  Repeat testing should be performed if clinically indicated.     Female and children under 18 years old 14-50 ng/L; Male 21-50 ng/L:  Consistent with possible cardiac damage and possible increased clinical   risk. Serial measurements may help to assess extent of myocardial damage.     >50 ng/L: Consistent with cardiac damage, increased clinical risk and  myocardial infarction. Serial measurements may help assess extent of   myocardial damage.      NOTE: Children less than 1 year old may have higher baseline troponin   levels and results should be interpreted in conjunction with the overall   clinical context.     NOTE: Troponin I testing is performed using a different   testing methodology at [facility] than at other   [facility]. Direct result comparisons should only   be made within the same method.   COMPREHENSIVE METABOLIC PANEL - Abnormal    Glucose 205 (*)     Sodium 133 (*)     Potassium 4.6      Chloride 100      Bicarbonate 27      Anion Gap 11      Urea Nitrogen 25 (*)     Creatinine 1.56 (*)     eGFR 42 (*)     Calcium 9.4      Albumin 4.3      Alkaline Phosphatase 100      Total Protein 7.5      AST 21      Bilirubin, Total 1.0      ALT 20     SARS-COV-2 PCR - Abnormal    Coronavirus 2019, PCR Detected (*)     Narrative:     This assay has received FDA Emergency  Use Authorization (EUA) and is only authorized for the duration of time that circumstances exist to justify the authorization of the emergency use of in vitro diagnostic tests for the detection of SARS-CoV-2 virus and/or diagnosis of COVID-19 infection under section 564(b)(1) of the Act, 21 U.S.C. 360bbb-3(b)(1). This assay is an in vitro diagnostic nucleic acid amplification test for the qualitative detection of SARS-CoV-2 from nasopharyngeal specimens and has been validated for use at [facility]. Negative results do not preclude COVID-19 infections and should not be used as the sole basis for diagnosis, treatment, or other management decisions.     TROPONIN I, HIGH SENSITIVITY - Abnormal    Troponin I, High Sensitivity 26 (*)     Narrative:     Less than 99th percentile of normal range cutoff-  Female and children under 18 years old <14 ng/L; Male <21 ng/L: Negative  Repeat testing should be performed if clinically indicated.     Female and children under 18 years old 14-50 ng/L; Male 21-50 ng/L:  Consistent with possible cardiac damage and possible increased clinical   risk. Serial measurements may help to assess extent of myocardial damage.     >50 ng/L: Consistent with cardiac damage, increased clinical risk and  myocardial infarction. Serial measurements may help assess extent of   myocardial damage.      NOTE: Children less than 1 year old may have higher baseline troponin   levels and results should be interpreted in conjunction with the overall   clinical context.     NOTE: Troponin I testing is performed using a different   testing methodology at [facility] than at other   [facility]. Direct result comparisons should only   be made within the same method.   PROTIME-INR - Abnormal    Protime 24.5 (*)     INR 2.2 (*)    MAGNESIUM - Normal    Magnesium 1.87     LACTATE - Normal    Lactate 0.9      Narrative:     Venipuncture immediately after or during the  administration of Metamizole may lead to falsely low results. Testing should be performed immediately prior to Metamizole dosing.   INFLUENZA A AND B PCR - Normal    Flu A Result Not Detected      Flu B Result Not Detected      Narrative:     This assay is an in vitro diagnostic multiplex nucleic acid amplification test for the detection and discrimination of Influenza A & B from nasopharyngeal specimens, and has been validated for use at [facility]. Negative results do not preclude Influenza A/B infections, and should not be used as the sole basis for diagnosis, treatment, or other management decisions. If Influenza A/B and RSV PCR results are negative, testing for Parainfluenza virus, Adenovirus and Metapneumovirus is routinely performed for [facility] pediatric oncology and intensive care inpatients, and is available on other patients by placing an add-on request.   RSV PCR - Normal    RSV PCR Not Detected      Narrative:     This assay is an FDA-cleared, in vitro diagnostic nucleic acid amplification test for the detection of RSV from nasopharyngeal specimens, and has been validated for use at [facility]. Negative results do not preclude RSV infections, and should not be used as the sole basis for diagnosis, treatment, or other management decisions. If Influenza A/B and RSV PCR results are negative, testing for Parainfluenza virus, Adenovirus and Metapneumovirus is routinely performed for pediatric oncology and intensive care inpatients at [facility], and is available on other patients by placing an add-on request.       URINALYSIS WITH REFLEX CULTURE AND MICROSCOPIC    Narrative:     The following orders were created for panel order Urinalysis with Reflex Culture and Microscopic.  Procedure                               Abnormality         Status                     ---------                               -----------         ------                     Urinalysis with Reflex  C...[[id number]]                                                 Extra Urine Gray Tube[[id number]]                                                         Please view results for these tests on the individual orders.   URINALYSIS WITH REFLEX CULTURE AND MICROSCOPIC   EXTRA URINE GRAY TUBE       All other labs were within normal range or not returned as of this dictation.    EMERGENCY DEPARTMENT COURSE/MDM:   Vitals:    Vitals:    12/27/24 1930 12/27/24 1945 12/27/24 2000 12/27/24 2030   BP: 139/65   153/68   BP Location:       Patient Position:       Pulse: 66 72 69 70   Resp:       Temp:       TempSrc:       SpO2: 98% 94% 95% 97%   Weight:       Height:           I reviewed the patient's triage vitals and they are hypertensive recommend follow-up with primary physician for repeat checks.  Patient is febrile therefore Tylenol is ordered.    Due to the above findings the following was ordered lab work including viral swabs and chest x-ray obtained.    Viral swabs for influenza are negative COVID-positive.  Patient is not hypoxic at this time.  His INR is therapeutic for his warfarin usage.  Cardiac troponin no significant delta change no acute injury pattern on EKG doubt atypical ACS no runs of dysrhythmias suspect demand induced.  Patient's renal function is at baseline no self electrolyte derangements.  No leukocytosis no signs of superimposed pneumonia no indication for antibiotics at this time.  Did have a thorough shared medical decision making discussion with patient and family he is requesting hospital admission due to generalized weakness inability to care for himself feel this is reasonable.  Spoke with the admitting hospitalist who is agreed to accept the patient she did take over care at time of admission order.  Patient resting comfortably.    ED Course as of 12/27/24 2042   Fri Dec 27, 2024   1654 Interpreted by the Emergency Department Attending: ECG revealed normal sinus rhythm right bundle branch block  at a rate of 74 beats per minute with OK interval 240 , QRS of 136 , QTc of 455.  No acute injury pattern. Previous EKG on October 10 revealed nonspecific changes.    [MG]      ED Course User Index  [MG] Drew Valiente DO         Diagnoses as of 12/27/24 2042   COVID   Generalized weakness       Patient was counseled regarding labs, imaging, likely diagnosis, and plan. All questions were answered.     ------------------------------------------------------------------  Information provided by the patient and family  Consults hospitalist  Past medical history complicating workup on anticoagulation  Previous medical records reviewed office visit 7/17/2024  Considered CTA of the chest although therapeutic on anticoagulation.  Shared medical decision making patient agreeable with hospital admission for further workup and treatment.  ------------------------------------------------------------------  ED Medications administered this visit:    Medications   acetaminophen (Tylenol) tablet 650 mg (650 mg oral Given 12/27/24 1730)        Final Impression:   1. COVID    2. Generalized weakness          Drew Valiente DO    (Please note that portions of this note were completed with a voice recognition program.  Efforts were made to edit the dictations but occasionally words are mis-transcribed.)     Drew Valiente DO  12/27/24 2045

## 2024-12-29 VITALS
DIASTOLIC BLOOD PRESSURE: 62 MMHG | BODY MASS INDEX: 22.22 KG/M2 | SYSTOLIC BLOOD PRESSURE: 123 MMHG | RESPIRATION RATE: 18 BRPM | HEIGHT: 69 IN | TEMPERATURE: 97.2 F | OXYGEN SATURATION: 94 % | HEART RATE: 94 BPM | WEIGHT: 150 LBS

## 2024-12-29 LAB
ALBUMIN SERPL BCP-MCNC: 3.5 G/DL (ref 3.4–5)
ANION GAP SERPL CALC-SCNC: 12 MMOL/L (ref 10–20)
BUN SERPL-MCNC: 28 MG/DL (ref 6–23)
CALCIUM SERPL-MCNC: 8.5 MG/DL (ref 8.6–10.3)
CHLORIDE SERPL-SCNC: 100 MMOL/L (ref 98–107)
CO2 SERPL-SCNC: 27 MMOL/L (ref 21–32)
CREAT SERPL-MCNC: 1.5 MG/DL (ref 0.5–1.3)
EGFRCR SERPLBLD CKD-EPI 2021: 44 ML/MIN/1.73M*2
ERYTHROCYTE [DISTWIDTH] IN BLOOD BY AUTOMATED COUNT: 14.3 % (ref 11.5–14.5)
GLUCOSE BLD MANUAL STRIP-MCNC: 138 MG/DL (ref 74–99)
GLUCOSE BLD MANUAL STRIP-MCNC: 213 MG/DL (ref 74–99)
GLUCOSE BLD MANUAL STRIP-MCNC: 242 MG/DL (ref 74–99)
GLUCOSE BLD MANUAL STRIP-MCNC: 248 MG/DL (ref 74–99)
GLUCOSE SERPL-MCNC: 156 MG/DL (ref 74–99)
HCT VFR BLD AUTO: 30.9 % (ref 41–52)
HGB BLD-MCNC: 9.9 G/DL (ref 13.5–17.5)
MAGNESIUM SERPL-MCNC: 1.63 MG/DL (ref 1.6–2.4)
MCH RBC QN AUTO: 30.6 PG (ref 26–34)
MCHC RBC AUTO-ENTMCNC: 32 G/DL (ref 32–36)
MCV RBC AUTO: 95 FL (ref 80–100)
NRBC BLD-RTO: 0 /100 WBCS (ref 0–0)
PHOSPHATE SERPL-MCNC: 3.5 MG/DL (ref 2.5–4.9)
PLATELET # BLD AUTO: 113 X10*3/UL (ref 150–450)
POTASSIUM SERPL-SCNC: 3.8 MMOL/L (ref 3.5–5.3)
RBC # BLD AUTO: 3.24 X10*6/UL (ref 4.5–5.9)
SODIUM SERPL-SCNC: 135 MMOL/L (ref 136–145)
WBC # BLD AUTO: 4.4 X10*3/UL (ref 4.4–11.3)

## 2024-12-29 PROCEDURE — 36415 COLL VENOUS BLD VENIPUNCTURE: CPT | Performed by: INTERNAL MEDICINE

## 2024-12-29 PROCEDURE — 2500000004 HC RX 250 GENERAL PHARMACY W/ HCPCS (ALT 636 FOR OP/ED): Performed by: INTERNAL MEDICINE

## 2024-12-29 PROCEDURE — 2500000002 HC RX 250 W HCPCS SELF ADMINISTERED DRUGS (ALT 637 FOR MEDICARE OP, ALT 636 FOR OP/ED): Performed by: INTERNAL MEDICINE

## 2024-12-29 PROCEDURE — 1200000002 HC GENERAL ROOM WITH TELEMETRY DAILY

## 2024-12-29 PROCEDURE — 83735 ASSAY OF MAGNESIUM: CPT | Performed by: INTERNAL MEDICINE

## 2024-12-29 PROCEDURE — 99232 SBSQ HOSP IP/OBS MODERATE 35: CPT | Performed by: INTERNAL MEDICINE

## 2024-12-29 PROCEDURE — 80069 RENAL FUNCTION PANEL: CPT | Performed by: INTERNAL MEDICINE

## 2024-12-29 PROCEDURE — 2500000001 HC RX 250 WO HCPCS SELF ADMINISTERED DRUGS (ALT 637 FOR MEDICARE OP): Performed by: INTERNAL MEDICINE

## 2024-12-29 PROCEDURE — 85027 COMPLETE CBC AUTOMATED: CPT | Performed by: INTERNAL MEDICINE

## 2024-12-29 PROCEDURE — 82947 ASSAY GLUCOSE BLOOD QUANT: CPT

## 2024-12-29 RX ADMIN — INSULIN LISPRO 4 UNITS: 100 INJECTION, SOLUTION INTRAVENOUS; SUBCUTANEOUS at 17:40

## 2024-12-29 RX ADMIN — LEVOTHYROXINE SODIUM 100 MCG: 0.1 TABLET ORAL at 05:32

## 2024-12-29 RX ADMIN — TORSEMIDE 10 MG: 20 TABLET ORAL at 09:15

## 2024-12-29 RX ADMIN — INSULIN LISPRO 4 UNITS: 100 INJECTION, SOLUTION INTRAVENOUS; SUBCUTANEOUS at 11:42

## 2024-12-29 RX ADMIN — ATORVASTATIN CALCIUM 10 MG: 10 TABLET, FILM COATED ORAL at 19:38

## 2024-12-29 RX ADMIN — REMDESIVIR 100 MG: 100 INJECTION, POWDER, LYOPHILIZED, FOR SOLUTION INTRAVENOUS at 09:16

## 2024-12-29 ASSESSMENT — COGNITIVE AND FUNCTIONAL STATUS - GENERAL
DAILY ACTIVITIY SCORE: 20
WALKING IN HOSPITAL ROOM: A LITTLE
MOBILITY SCORE: 20
HELP NEEDED FOR BATHING: A LITTLE
CLIMB 3 TO 5 STEPS WITH RAILING: A LITTLE
STANDING UP FROM CHAIR USING ARMS: A LITTLE
EATING MEALS: A LITTLE
PERSONAL GROOMING: A LITTLE
WALKING IN HOSPITAL ROOM: A LITTLE
MOVING FROM LYING ON BACK TO SITTING ON SIDE OF FLAT BED WITH BEDRAILS: A LITTLE
STANDING UP FROM CHAIR USING ARMS: A LITTLE
MOVING TO AND FROM BED TO CHAIR: A LITTLE
TOILETING: A LITTLE
DRESSING REGULAR UPPER BODY CLOTHING: A LITTLE
HELP NEEDED FOR BATHING: A LITTLE
MOBILITY SCORE: 18
DRESSING REGULAR LOWER BODY CLOTHING: A LITTLE
DAILY ACTIVITIY SCORE: 18
TOILETING: A LITTLE
CLIMB 3 TO 5 STEPS WITH RAILING: A LITTLE
MOVING TO AND FROM BED TO CHAIR: A LITTLE
DRESSING REGULAR LOWER BODY CLOTHING: A LITTLE
DRESSING REGULAR UPPER BODY CLOTHING: A LITTLE
TURNING FROM BACK TO SIDE WHILE IN FLAT BAD: A LITTLE

## 2024-12-29 ASSESSMENT — PAIN - FUNCTIONAL ASSESSMENT: PAIN_FUNCTIONAL_ASSESSMENT: 0-10

## 2024-12-29 ASSESSMENT — PAIN SCALES - GENERAL
PAINLEVEL_OUTOF10: 0 - NO PAIN

## 2024-12-29 NOTE — CARE PLAN
The patient's goals for the shift include      The clinical goals for the shift include Patient will remain safe and free from falls for the remainder of the shift.    Over the shift, the patient did make progress toward the following goals.

## 2024-12-29 NOTE — PROGRESS NOTES
Saul Asif is a 89 y.o. male on day 1 of admission presenting with COVID-19.      Subjective   Patient was seen and examined at bedside, he was accompanied by his daughter in law .  He was not in any acute distress.  He stated that he feels tired, and he wasn't feeling safe to go home today. Denies any f/c, n/v, new onset headaches, vision changes, chest pain, dyspnea, abdominal pain, changes in BM, or urinary changes.         Objective     Last Recorded Vitals  /58   Pulse 82   Temp 36.5 °C (97.7 °F)   Resp 18   Wt 68 kg (150 lb)   SpO2 94%   Intake/Output last 3 Shifts:    Intake/Output Summary (Last 24 hours) at 12/29/2024 1507  Last data filed at 12/29/2024 1300  Gross per 24 hour   Intake --   Output 830 ml   Net -830 ml       Admission Weight  Weight: 68 kg (150 lb) (12/27/24 1225)    Daily Weight  12/27/24 : 68 kg (150 lb)    Image Results  XR chest 2 views  Narrative: Interpreted By:  Maycol Baca,   STUDY:  XR CHEST 2 VIEWS      INDICATION:  Signs/Symptoms:Cough.      COMPARISON:  November 29, 2021      ACCESSION NUMBER(S):  [id number]      ORDERING CLINICIAN:  BERNARD OLIVARES      FINDINGS:  Cardiomegaly. Tiny pleural effusions suggested. No renetta edema. No  consolidation.      Impression: Cardiomegaly with suspected tiny pleural effusions. No evidence of  other acute findings.      Signed by: Maycol Baca 12/27/2024 3:25 PM  Dictation workstation:   [id number]      Physical Exam  Constitutional:       Appearance: Normal appearance. He is normal weight.   HENT:      Head: Normocephalic and atraumatic.      Right Ear: Tympanic membrane normal.      Left Ear: Tympanic membrane normal.      Nose: Nose normal.      Mouth/Throat:      Mouth: Mucous membranes are moist.      Pharynx: Oropharynx is clear.   Eyes:      General: No scleral icterus.        Right eye: No discharge.         Left eye: No discharge.      Extraocular Movements: Extraocular movements intact.      Conjunctiva/sclera:  Conjunctivae normal.      Pupils: Pupils are equal, round, and reactive to light.   Neck:      Vascular: No carotid bruit.   Cardiovascular:      Rate and Rhythm: Normal rate and regular rhythm.      Pulses: Normal pulses.      Heart sounds: No murmur heard.     No friction rub. No gallop.   Pulmonary:      Effort: Pulmonary effort is normal.      Breath sounds: Normal breath sounds.   Abdominal:      General: Bowel sounds are normal. There is no distension.      Palpations: Abdomen is soft. There is no mass.      Tenderness: There is no abdominal tenderness. There is no right CVA tenderness, left CVA tenderness, guarding or rebound.      Hernia: No hernia is present.   Musculoskeletal:         General: No swelling, tenderness, deformity or signs of injury.      Cervical back: Normal range of motion and neck supple. No rigidity or tenderness.      Right lower leg: No edema.      Left lower leg: No edema.   Lymphadenopathy:      Cervical: No cervical adenopathy.   Skin:     General: Skin is warm.      Coloration: Skin is not jaundiced or pale.      Findings: No bruising, erythema, lesion or rash.   Neurological:      General: No focal deficit present.      Mental Status: He is alert and oriented to person, place, and time. Mental status is at baseline.   Psychiatric:         Mood and Affect: Mood normal.         Behavior: Behavior normal.         Thought Content: Thought content normal.         Judgment: Judgment normal.         Relevant Results               Assessment/Plan          Assessment & Plan  COVID-19    COVID  COVID 19  Atrial Fibrillation   DM II  Hypothyroidism   CKD III  PUD  Hx H pylori   HLD  Severe aortic value stenosis  Severe mitral value regurgitation         On room air and hemodynamically stable.  Started on Remdisvir with supportive supportive.   Reconcile home meds  PT/OT; scores 18 and 20 respectively  Patient was feeling tired today and unsafe to go home, will reasses him tomorrow and  possible discharge.         DVT Prophylaxis: on Coumadin   Code Status: FULL CODE. Per PCP note has advanced directives but patient is unsure.  Disposition: Likely discharge tomorrow if remains HD stable and may need HHC                Gala Campo, DO

## 2024-12-29 NOTE — ASSESSMENT & PLAN NOTE
COVID 19  Atrial Fibrillation   DM II  Hypothyroidism   CKD III  PUD  Hx H pylori   HLD  Severe aortic value stenosis  Severe mitral value regurgitation         On room air and hemodynamically stable.  Started on Remdisvir with supportive supportive.   Reconcile home meds  PT/OT; scores 18 and 20 respectively  Patient was feeling tired today and unsafe to go home, will reasses him tomorrow and possible discharge.         DVT Prophylaxis: on Coumadin   Code Status: FULL CODE. Per PCP note has advanced directives but patient is unsure.  Disposition: Likely discharge tomorrow if remains HD stable and may need HHC

## 2024-12-29 NOTE — PROGRESS NOTES
24 0952   Discharge Planning   Living Arrangements Alone   Support Systems Family members;Children   Assistance Needed None   Type of Residence Private residence   Home or Post Acute Services None   Expected Discharge Disposition Home   Does the patient need discharge transport arranged? Yes     TCC note: : Met with pt. at bedside, introduced self and role on the Transition of Care Team.  Verified name, , demographics, insurance, PCP is Dr. Erickson. Emergency contact is [facility] Phone: [phone]. Pt. lives alone and is Independent with ADLs, household chores and drives. Pt. denies any recent falls and does not use an assistive device for ambulation. Pt. is diabetic and does check his blood sugar at home. Pt does not use any home O2 or any other home services/supplies. Preferred pharmacy is [facility] or [facility] on [address].  No problems affording or obtaining medications. Pt. states that he will need transportation home.  Transitions of Care will continue to follow until discharge. Ruth Snow, MSN, RN, TCC.

## 2024-12-30 ENCOUNTER — PHARMACY VISIT (OUTPATIENT)
Dept: PHARMACY | Facility: CLINIC | Age: 89
End: 2024-12-30
Payer: COMMERCIAL

## 2024-12-30 ENCOUNTER — HOME HEALTH ADMISSION (OUTPATIENT)
Dept: HOME HEALTH SERVICES | Facility: HOME HEALTH | Age: 89
End: 2024-12-30
Payer: MEDICARE

## 2024-12-30 ENCOUNTER — DOCUMENTATION (OUTPATIENT)
Dept: HOME HEALTH SERVICES | Facility: HOME HEALTH | Age: 89
End: 2024-12-30
Payer: MEDICARE

## 2024-12-30 VITALS
WEIGHT: 150 LBS | HEIGHT: 69 IN | HEART RATE: 78 BPM | RESPIRATION RATE: 18 BRPM | BODY MASS INDEX: 22.22 KG/M2 | DIASTOLIC BLOOD PRESSURE: 56 MMHG | OXYGEN SATURATION: 95 % | SYSTOLIC BLOOD PRESSURE: 117 MMHG | TEMPERATURE: 97 F

## 2024-12-30 LAB
ALBUMIN SERPL BCP-MCNC: 3.5 G/DL (ref 3.4–5)
ANION GAP SERPL CALC-SCNC: 12 MMOL/L (ref 10–20)
BUN SERPL-MCNC: 33 MG/DL (ref 6–23)
CALCIUM SERPL-MCNC: 8.5 MG/DL (ref 8.6–10.3)
CHLORIDE SERPL-SCNC: 99 MMOL/L (ref 98–107)
CO2 SERPL-SCNC: 27 MMOL/L (ref 21–32)
CREAT SERPL-MCNC: 1.61 MG/DL (ref 0.5–1.3)
EGFRCR SERPLBLD CKD-EPI 2021: 40 ML/MIN/1.73M*2
ERYTHROCYTE [DISTWIDTH] IN BLOOD BY AUTOMATED COUNT: 13.9 % (ref 11.5–14.5)
GLUCOSE BLD MANUAL STRIP-MCNC: 199 MG/DL (ref 74–99)
GLUCOSE SERPL-MCNC: 197 MG/DL (ref 74–99)
HCT VFR BLD AUTO: 32.8 % (ref 41–52)
HGB BLD-MCNC: 10.4 G/DL (ref 13.5–17.5)
MAGNESIUM SERPL-MCNC: 1.81 MG/DL (ref 1.6–2.4)
MCH RBC QN AUTO: 30.6 PG (ref 26–34)
MCHC RBC AUTO-ENTMCNC: 31.7 G/DL (ref 32–36)
MCV RBC AUTO: 97 FL (ref 80–100)
NRBC BLD-RTO: 0 /100 WBCS (ref 0–0)
PHOSPHATE SERPL-MCNC: 3.6 MG/DL (ref 2.5–4.9)
PLATELET # BLD AUTO: 122 X10*3/UL (ref 150–450)
POTASSIUM SERPL-SCNC: 3.8 MMOL/L (ref 3.5–5.3)
RBC # BLD AUTO: 3.4 X10*6/UL (ref 4.5–5.9)
SODIUM SERPL-SCNC: 134 MMOL/L (ref 136–145)
WBC # BLD AUTO: 4.5 X10*3/UL (ref 4.4–11.3)

## 2024-12-30 PROCEDURE — 2500000004 HC RX 250 GENERAL PHARMACY W/ HCPCS (ALT 636 FOR OP/ED): Performed by: INTERNAL MEDICINE

## 2024-12-30 PROCEDURE — 83735 ASSAY OF MAGNESIUM: CPT | Performed by: INTERNAL MEDICINE

## 2024-12-30 PROCEDURE — 85027 COMPLETE CBC AUTOMATED: CPT | Performed by: INTERNAL MEDICINE

## 2024-12-30 PROCEDURE — 2500000002 HC RX 250 W HCPCS SELF ADMINISTERED DRUGS (ALT 637 FOR MEDICARE OP, ALT 636 FOR OP/ED): Performed by: INTERNAL MEDICINE

## 2024-12-30 PROCEDURE — 2500000001 HC RX 250 WO HCPCS SELF ADMINISTERED DRUGS (ALT 637 FOR MEDICARE OP): Performed by: INTERNAL MEDICINE

## 2024-12-30 PROCEDURE — 36415 COLL VENOUS BLD VENIPUNCTURE: CPT | Performed by: INTERNAL MEDICINE

## 2024-12-30 PROCEDURE — 80069 RENAL FUNCTION PANEL: CPT | Performed by: INTERNAL MEDICINE

## 2024-12-30 PROCEDURE — RXMED WILLOW AMBULATORY MEDICATION CHARGE

## 2024-12-30 PROCEDURE — 82947 ASSAY GLUCOSE BLOOD QUANT: CPT

## 2024-12-30 PROCEDURE — 99239 HOSP IP/OBS DSCHRG MGMT >30: CPT | Performed by: INTERNAL MEDICINE

## 2024-12-30 RX ORDER — DEXAMETHASONE 6 MG/1
6 TABLET ORAL DAILY
Qty: 5 TABLET | Refills: 0 | Status: SHIPPED | OUTPATIENT
Start: 2024-12-30 | End: 2025-01-04

## 2024-12-30 RX ORDER — DEXAMETHASONE 6 MG/1
6 TABLET ORAL DAILY
Qty: 5 TABLET | Refills: 0 | Status: SHIPPED | OUTPATIENT
Start: 2024-12-30 | End: 2024-12-30 | Stop reason: HOSPADM

## 2024-12-30 RX ORDER — ALBUTEROL SULFATE 90 UG/1
2 INHALANT RESPIRATORY (INHALATION) EVERY 6 HOURS PRN
Qty: 6.7 G | Refills: 11 | Status: SHIPPED | OUTPATIENT
Start: 2024-12-30

## 2024-12-30 RX ADMIN — TORSEMIDE 10 MG: 20 TABLET ORAL at 08:59

## 2024-12-30 RX ADMIN — INSULIN LISPRO 2 UNITS: 100 INJECTION, SOLUTION INTRAVENOUS; SUBCUTANEOUS at 08:55

## 2024-12-30 RX ADMIN — REMDESIVIR 100 MG: 100 INJECTION, POWDER, LYOPHILIZED, FOR SOLUTION INTRAVENOUS at 09:00

## 2024-12-30 RX ADMIN — LEVOTHYROXINE SODIUM 100 MCG: 0.1 TABLET ORAL at 05:54

## 2024-12-30 ASSESSMENT — COGNITIVE AND FUNCTIONAL STATUS - GENERAL
TOILETING: A LITTLE
CLIMB 3 TO 5 STEPS WITH RAILING: A LITTLE
STANDING UP FROM CHAIR USING ARMS: A LITTLE
MOBILITY SCORE: 20
DAILY ACTIVITIY SCORE: 20
WALKING IN HOSPITAL ROOM: A LITTLE
MOVING TO AND FROM BED TO CHAIR: A LITTLE
DRESSING REGULAR LOWER BODY CLOTHING: A LITTLE
HELP NEEDED FOR BATHING: A LITTLE
DRESSING REGULAR UPPER BODY CLOTHING: A LITTLE

## 2024-12-30 ASSESSMENT — PAIN SCALES - GENERAL: PAINLEVEL_OUTOF10: 0 - NO PAIN

## 2024-12-30 ASSESSMENT — PAIN - FUNCTIONAL ASSESSMENT: PAIN_FUNCTIONAL_ASSESSMENT: 0-10

## 2024-12-30 NOTE — HH CARE COORDINATION
Home Care received a Referral for Physical Therapy, Occupational Therapy, and Home Health Aide. We have processed the referral for a Start of Care on 24-48 HOURS .     If you have any questions or concerns, please feel free to contact us at [phone]. Follow the prompts, enter your five digit zip code, and you will be directed to your care team on WEST 3.

## 2024-12-30 NOTE — CARE PLAN
The clinical goals for the shift include Patient will remain safe and free from falls for the remainder of the shift.

## 2024-12-30 NOTE — PROGRESS NOTES
12/30/2024  [facility]- 18, low recommended.  Followed up with pt and dtr in room, introduced self and explained role.  Discussed how he did in therapy and recommendation.  Discussed freedom of choice.    Pt stated he wants to stay in the  system and would like [facility].    Stated he will be going to stay with a friend upon discharge and would like therapy to go there.  Staying with friend gopal- [place] [phone], cell- [phone], address [address] [place] [address] [address] [place] [id number]  Daughter will  upon discharge.   Secure chat to [facility] to notify them.   Monica Eng RN TCC

## 2024-12-31 ENCOUNTER — TELEPHONE (OUTPATIENT)
Dept: PRIMARY CARE | Facility: CLINIC | Age: 89
End: 2024-12-31
Payer: MEDICARE

## 2024-12-31 ENCOUNTER — PATIENT OUTREACH (OUTPATIENT)
Dept: PRIMARY CARE | Facility: CLINIC | Age: 89
End: 2024-12-31
Payer: MEDICARE

## 2024-12-31 LAB
ATRIAL RATE: 76 BPM
Q ONSET: 228 MS
QRS COUNT: 12 BEATS
QRS DURATION: 136 MS
QT INTERVAL: 410 MS
QTC CALCULATION(BAZETT): 455 MS
QTC FREDERICIA: 440 MS
R AXIS: 109 DEGREES
T AXIS: 31 DEGREES
T OFFSET: 433 MS
VENTRICULAR RATE: 74 BPM

## 2024-12-31 NOTE — DISCHARGE SUMMARY
Discharge Diagnosis  COVID-19    Issues Requiring Follow-Up  Follow up with PCP  Continue with [facility]    Discharge Meds     Medication List      START taking these medications     albuterol 90 mcg/actuation inhaler; Inhale 2 puffs every 6 hours if   needed for wheezing or shortness of breath.   dexAMETHasone 6 mg tablet; Commonly known as: Decadron; Take 1 tablet (6   mg) by mouth once daily for 5 days.     CHANGE how you take these medications     simethicone 80 mg chewable tablet; Commonly known as: Mylicon; Chew 1   tablet (80 mg) every 6 hours if needed for flatulence.; What changed:   Another medication with the same name was removed. Continue taking this   medication, and follow the directions you see here.     CONTINUE taking these medications     Afrin (oxymetazoline) 0.05 % nasal spray; Generic drug: oxymetazoline   atorvastatin 10 mg tablet; Commonly known as: Lipitor; Take 1 tablet (10   mg) by mouth once daily at bedtime.   azelaic acid 15 % gel; Commonly known as: Finacea   Blood Glucose Test strip; Generic drug: blood sugar diagnostic; 1 strip   by abdominal subcutaneous route 2 times a day. twice a day.   Combigan 0.2-0.5 % ophthalmic solution; Generic drug:   brimonidine-timoloL   ferrous sulfate (325 mg ferrous sulfate) tablet   glipiZIDE 5 mg tablet; Commonly known as: Glucotrol   insulin degludec 100 unit/mL (3 mL) injection; Commonly known as:   Tresiba FlexTouch U-100; Inject 8 Units under the skin once daily in the   morning.   ketoconazole 2 % shampoo; Commonly known as: NIZOral   * levothyroxine 100 mcg tablet; Commonly known as: Synthroid, Levoxyl;   Take 1 tablet (100 mcg) by mouth once daily.   * levothyroxine 100 mcg tablet; Commonly known as: Synthroid, Levoxyl;   TAKE 1 TABLET BY MOUTH ONCE  DAILY   Metamucil Sugar-Free (aspart) 3.4 gram/5.8 gram powder; Generic drug:   psyllium husk (aspartame)   metroNIDAZOLE 0.75 % cream; Commonly known as: Metrocream   pantoprazole 40 mg EC tablet;  "Commonly known as: ProtoNix; Take 1 tablet   (40 mg) by mouth once daily.   pen needle, diabetic 31 gauge x 3/16\" needle; Commonly known as: BD   Ultra-Fine Mini Pen Needle; TEST BLOOD SUGAR TWICE A DAY   polyethylene glycol 17 gram packet; Commonly known as: Glycolax,   Miralax; Take 17 g by mouth once daily.   sennosides-docusate sodium 8.6-50 mg tablet; Commonly known as:   Elaine-Colace; Take 2 tablets by mouth once daily.   torsemide 10 mg tablet; Commonly known as: Demadex; Take 1 tablet (10   mg) by mouth once daily.   Vitamin D2 1,250 mcg (50,000 unit) capsule; Generic drug: ergocalciferol   * warfarin 2.5 mg tablet; Commonly known as: Coumadin; Take as directed.   If you are unsure how to take this medication, talk to your nurse or   doctor.   * warfarin 2.5 mg tablet; Commonly known as: Coumadin; Take as directed.   If you are unsure how to take this medication, talk to your nurse or   doctor.; Original instructions: Take 1 tablet (2.5 mg) by mouth once daily   at bedtime.  * This list has 4 medication(s) that are the same as other medications   prescribed for you. Read the directions carefully, and ask your doctor or   other care provider to review them with you.     STOP taking these medications     glyBURIDE 5 mg tablet; Commonly known as: Diabeta   pioglitazone 45 mg tablet; Commonly known as: Actos   SITagliptin phosphate 50 mg tablet; Commonly known as: Januvia       Test Results Pending At Discharge  Pending Labs       No current pending labs.            Hospital Course   Saul Asif is a 89 y.o. male who presents to the hospital with complaints of generalized weakness and flu like symptoms. Has had subjective fevers and non productive cough. Breathing is okay. Found to be positive for COVID 19. Not needing O2.      A 10 point ROS was completed and is negative expect as stated in HPI.      Past Medical History:  Atrial Fibrillation   DM II  Hypothyroidism   CKD III  PUD  Hx H pylori   HLD  Severe " aortic value stenosis  Severe mitral value regurgitation   Skin cancer   Prostate CA s/p radiation      Past Surgical History:  MOHS  Endoscopy/Colonoscopy  L TKR   Patient was started on remdesivir for 3 doses, with no dexamethasone as the patient was on room air.  He was feeling tired and supportive treatment wanted him.  PT/OT evaluated him and he is started to improve.  Condition was discussed with the family at bedside multiple times.  On the day of discharge, he was wheezy for the first time.  He was discharged with albuterol inhaler and short course of steroids.  Pt is stable and medically optimized for discharge and was advised to follow up with PCP and continue home meds. Discussed diagnosis and treatment in detail and answered all questions. Patient expressed understanding and agreed with plan.      Pertinent Physical Exam At Time of Discharge  Physical Exam  Constitutional:       Appearance: Normal appearance. He is normal weight.   HENT:      Head: Normocephalic and atraumatic.      Right Ear: Tympanic membrane normal.      Left Ear: Tympanic membrane normal.      Nose: Nose normal.      Mouth/Throat:      Mouth: Mucous membranes are moist.      Pharynx: Oropharynx is clear.   Eyes:      General: No scleral icterus.        Right eye: No discharge.         Left eye: No discharge.      Extraocular Movements: Extraocular movements intact.      Conjunctiva/sclera: Conjunctivae normal.      Pupils: Pupils are equal, round, and reactive to light.   Neck:      Vascular: No carotid bruit.   Cardiovascular:      Rate and Rhythm: Normal rate and regular rhythm.      Pulses: Normal pulses.      Heart sounds: No murmur heard.     No friction rub. No gallop.   Pulmonary:      Effort: Pulmonary effort is normal.      Breath sounds: Wheezing present.   Abdominal:      General: Bowel sounds are normal. There is no distension.      Palpations: Abdomen is soft. There is no mass.      Tenderness: There is no abdominal  tenderness. There is no right CVA tenderness, left CVA tenderness, guarding or rebound.      Hernia: No hernia is present.   Musculoskeletal:         General: No swelling, tenderness, deformity or signs of injury.      Cervical back: Normal range of motion and neck supple. No rigidity or tenderness.      Right lower leg: No edema.      Left lower leg: No edema.   Lymphadenopathy:      Cervical: No cervical adenopathy.   Skin:     General: Skin is warm.      Coloration: Skin is not jaundiced or pale.      Findings: No bruising, erythema, lesion or rash.   Neurological:      General: No focal deficit present.      Mental Status: He is alert and oriented to person, place, and time. Mental status is at baseline.   Psychiatric:         Mood and Affect: Mood normal.         Behavior: Behavior normal.         Thought Content: Thought content normal.         Judgment: Judgment normal.         Outpatient Follow-Up  Future Appointments   Date Time Provider Department Center   1/1/2025 To Be Determined Anne Marie Murphy, PT [facility]   1/2/2025 To Be Determined Jhonathan Mittal, OT [facility]   1/8/2025  9:45 AM PAR [id number] PHARM ACOAG PHARMACIST [id number] [place]   2/27/2025  9:00 AM PAR MAC 3 ECHO ROOM 3 [id number]   2/27/2025 10:00 AM Matthew Tubbs MD [id number] [place]   4/22/2025  9:30 AM Emiliano Avina MD [id number] [place]   5/9/2025 10:00 AM Woody Wynn MD [id number] [facility] spent >30 minutes for discharge planning and coordination.    Gala Campo, DO

## 2024-12-31 NOTE — PROGRESS NOTES
Discharge Facility:[facility]  Discharge Diagnosis:COVID-19  Admission Date:12/27/24  Discharge Date: 12/30/24    PCP Appointment Date:No contact made. Message sent to pcp office.  Specialist Appointment Date: TBD  Hospital Encounter and Summary Linked: Yes    2 call attempts made

## 2025-01-03 ENCOUNTER — HOME CARE VISIT (OUTPATIENT)
Dept: HOME HEALTH SERVICES | Facility: HOME HEALTH | Age: OVER 89
End: 2025-01-03

## 2025-01-06 ENCOUNTER — HOME CARE VISIT (OUTPATIENT)
Dept: HOME HEALTH SERVICES | Facility: HOME HEALTH | Age: OVER 89
End: 2025-01-06
Payer: MEDICARE

## 2025-01-06 VITALS
DIASTOLIC BLOOD PRESSURE: 65 MMHG | HEART RATE: 67 BPM | RESPIRATION RATE: 18 BRPM | OXYGEN SATURATION: 98 % | SYSTOLIC BLOOD PRESSURE: 150 MMHG | TEMPERATURE: 97.8 F

## 2025-01-06 PROCEDURE — G0151 HHCP-SERV OF PT,EA 15 MIN: HCPCS | Mod: HHH

## 2025-01-06 PROCEDURE — 169592 NO-PAY CLAIM PROCEDURE

## 2025-01-06 SDOH — HEALTH STABILITY: PHYSICAL HEALTH: EXERCISE TYPE: WRITTEN

## 2025-01-06 SDOH — HEALTH STABILITY: PHYSICAL HEALTH: PHYSICAL EXERCISE: 10

## 2025-01-06 ASSESSMENT — ENCOUNTER SYMPTOMS
PERSON REPORTING PAIN: PATIENT
SHORTNESS OF BREATH: T
LOWEST PAIN SEVERITY IN PAST 24 HOURS: 2/10
PAIN LOCATION - EXACERBATING FACTORS: MVMT
PAIN LOCATION: BACK
PAIN: 1
PAIN SEVERITY GOAL: 0/10
HIGHEST PAIN SEVERITY IN PAST 24 HOURS: 5/10
SUBJECTIVE PAIN PROGRESSION: WAXING AND WANING
PAIN LOCATION - PAIN FREQUENCY: INTERMITTENT
PAIN LOCATION - PAIN SEVERITY: 5/10
MUSCLE WEAKNESS: 1
PAIN LOCATION - PAIN DURATION: VARIES
PAIN LOCATION - RELIEVING FACTORS: REST
ARTHRALGIAS: 1
PAIN LOCATION - PAIN QUALITY: ACHING

## 2025-01-06 ASSESSMENT — ACTIVITIES OF DAILY LIVING (ADL)
AMBULATION ASSISTANCE ON FLAT SURFACES: 1
CURRENT_FUNCTION: STAND BY ASSIST
AMBULATION_DISTANCE/DURATION_TOLERATED: 50'
OASIS_M1830: 03
AMBULATION ASSISTANCE: 1
PHYSICAL TRANSFERS ASSESSED: 1
AMBULATION ASSISTANCE: STAND BY ASSIST
ENTERING_EXITING_HOME: MINIMUM ASSIST

## 2025-01-06 ASSESSMENT — BALANCE ASSESSMENTS
NUDGED: 1 - STAGGERS, GRABS, CATCHES SELF
ARISES: 1 - ABLE, USES ARMS TO HELP
TURNING 360 DEGREES STEPS: 1 - CONTINUOUS STEPS
EYES CLOSED AT MAXIMUM POSITION NUDGED: 1 - STEADY
BALANCE SCORE: 10
SITTING DOWN: 1 - USES ARMS OR NOT SMOOTH MOTION
SITTING BALANCE: 1 - STEADY, SAFE
NUDGED SCORE: 1
IMMEDIATE STANDING BALANCE FIRST 5 SECONDS: 1 - STEADY BUT USES WALKER OR OTHER SUPPORT
ATTEMPTS TO ARISE: 1 - ABLE, REQUIRES MORE THAN ONE ATTEMPT
STANDING BALANCE: 1 - STEADY BUT WIDE STANCE AND USES CANE OR OTHER SUPPORT
ARISING SCORE: 1

## 2025-01-06 ASSESSMENT — GAIT ASSESSMENTS
INITIATION OF GAIT IMMEDIATELY AFTER GO: 0 - ANY HESITANCY OR MULTIPLE ATTEMPTS TO START
STEP SYMMETRY: 0 - RIGHT AND LEFT STEP LENGTH NOT EQUAL
WALKING STANCE: 0 - HEELS APART
TRUNK: 1 - NO SWAY BUT FLEXION OF KNEES OR BACK OR SPREADS ARMS WHILE WALKING
TRUNK SCORE: 1
STEP CONTINUITY: 0 - STOPPING OR DISCONTINUITY BETWEEN STEPS
GAIT SCORE: 6
BALANCE AND GAIT SCORE: 16
PATH SCORE: 1
PATH: 1 - MILD/MODERATE DEVIATION OR USES WALKING AID

## 2025-01-08 ENCOUNTER — HOME CARE VISIT (OUTPATIENT)
Dept: HOME HEALTH SERVICES | Facility: HOME HEALTH | Age: OVER 89
End: 2025-01-08
Payer: MEDICARE

## 2025-01-08 ENCOUNTER — APPOINTMENT (OUTPATIENT)
Dept: PHARMACY | Facility: CLINIC | Age: OVER 89
End: 2025-01-08
Payer: MEDICARE

## 2025-01-08 VITALS — RESPIRATION RATE: 18 BRPM | TEMPERATURE: 97.2 F | HEART RATE: 65 BPM | OXYGEN SATURATION: 99 %

## 2025-01-08 PROCEDURE — G0152 HHCP-SERV OF OT,EA 15 MIN: HCPCS | Mod: HHH

## 2025-01-08 ASSESSMENT — ENCOUNTER SYMPTOMS
PERSON REPORTING PAIN: PATIENT
DENIES PAIN: 1

## 2025-01-08 ASSESSMENT — PAIN SCALES - PAIN ASSESSMENT IN ADVANCED DEMENTIA (PAINAD)
NEGVOCALIZATION: 0 - NONE.
NEGVOCALIZATION: 0
FACIALEXPRESSION: 0 - SMILING OR INEXPRESSIVE.
BODYLANGUAGE: 0 - RELAXED.
FACIALEXPRESSION: 0
BODYLANGUAGE: 0
CONSOLABILITY: 0
CONSOLABILITY: 0 - NO NEED TO CONSOLE.
BREATHING: 0
TOTALSCORE: 0

## 2025-01-08 ASSESSMENT — ACTIVITIES OF DAILY LIVING (ADL)
TOILETING: 1
DRESSING_LB_CURRENT_FUNCTION: MINIMUM ASSIST
BATHING_CURRENT_FUNCTION: MINIMUM ASSIST
FEEDING_WITHIN_DEFINED_LIMITS: 1
GROOMING_WITHIN_DEFINED_LIMITS: 1
TOILETING: INDEPENDENT
BATHING ASSESSED: 1

## 2025-01-09 ENCOUNTER — HOME CARE VISIT (OUTPATIENT)
Dept: HOME HEALTH SERVICES | Facility: HOME HEALTH | Age: OVER 89
End: 2025-01-09
Payer: MEDICARE

## 2025-01-09 PROCEDURE — G0156 HHCP-SVS OF AIDE,EA 15 MIN: HCPCS | Mod: HHH

## 2025-01-10 ENCOUNTER — HOME CARE VISIT (OUTPATIENT)
Dept: HOME HEALTH SERVICES | Facility: HOME HEALTH | Age: OVER 89
End: 2025-01-10
Payer: MEDICARE

## 2025-01-10 PROCEDURE — G0157 HHC PT ASSISTANT EA 15: HCPCS | Mod: CQ,HHH

## 2025-01-11 VITALS
HEART RATE: 68 BPM | SYSTOLIC BLOOD PRESSURE: 132 MMHG | TEMPERATURE: 97.5 F | DIASTOLIC BLOOD PRESSURE: 78 MMHG | RESPIRATION RATE: 18 BRPM | OXYGEN SATURATION: 98 %

## 2025-01-11 ASSESSMENT — ENCOUNTER SYMPTOMS
DENIES PAIN: 1
PERSON REPORTING PAIN: PATIENT

## 2025-01-14 ENCOUNTER — ANTICOAGULATION - WARFARIN VISIT (OUTPATIENT)
Dept: PHARMACY | Facility: CLINIC | Age: OVER 89
End: 2025-01-14
Payer: MEDICARE

## 2025-01-14 ENCOUNTER — HOME CARE VISIT (OUTPATIENT)
Dept: HOME HEALTH SERVICES | Facility: HOME HEALTH | Age: OVER 89
End: 2025-01-14
Payer: MEDICARE

## 2025-01-14 ENCOUNTER — APPOINTMENT (OUTPATIENT)
Dept: PRIMARY CARE | Facility: CLINIC | Age: OVER 89
End: 2025-01-14
Payer: MEDICARE

## 2025-01-14 VITALS
OXYGEN SATURATION: 100 % | HEART RATE: 52 BPM | SYSTOLIC BLOOD PRESSURE: 138 MMHG | DIASTOLIC BLOOD PRESSURE: 84 MMHG | WEIGHT: 162 LBS | BODY MASS INDEX: 23.92 KG/M2

## 2025-01-14 DIAGNOSIS — E11.9 TYPE 2 DIABETES MELLITUS WITHOUT COMPLICATION, WITHOUT LONG-TERM CURRENT USE OF INSULIN (MULTI): ICD-10-CM

## 2025-01-14 DIAGNOSIS — I48.91 ATRIAL FIBRILLATION, UNSPECIFIED TYPE (MULTI): ICD-10-CM

## 2025-01-14 DIAGNOSIS — I48.0 PAROXYSMAL ATRIAL FIBRILLATION (MULTI): ICD-10-CM

## 2025-01-14 DIAGNOSIS — I35.0 NONRHEUMATIC AORTIC VALVE STENOSIS: ICD-10-CM

## 2025-01-14 DIAGNOSIS — R53.1 GENERAL WEAKNESS: ICD-10-CM

## 2025-01-14 DIAGNOSIS — I48.91 ATRIAL FIBRILLATION, UNSPECIFIED TYPE (MULTI): Primary | ICD-10-CM

## 2025-01-14 DIAGNOSIS — Z09 HOSPITAL DISCHARGE FOLLOW-UP: ICD-10-CM

## 2025-01-14 DIAGNOSIS — I35.0 SEVERE AORTIC STENOSIS: ICD-10-CM

## 2025-01-14 DIAGNOSIS — U07.1 COVID-19: Primary | ICD-10-CM

## 2025-01-14 LAB
POC INR: 2
POC PROTHROMBIN TIME: NORMAL

## 2025-01-14 PROCEDURE — 99212 OFFICE O/P EST SF 10 MIN: CPT | Performed by: PHARMACIST

## 2025-01-14 PROCEDURE — 1036F TOBACCO NON-USER: CPT | Performed by: INTERNAL MEDICINE

## 2025-01-14 PROCEDURE — 85610 PROTHROMBIN TIME: CPT | Mod: QW | Performed by: PHARMACIST

## 2025-01-14 PROCEDURE — 99495 TRANSJ CARE MGMT MOD F2F 14D: CPT | Performed by: INTERNAL MEDICINE

## 2025-01-14 PROCEDURE — 1111F DSCHRG MED/CURRENT MED MERGE: CPT | Performed by: INTERNAL MEDICINE

## 2025-01-14 ASSESSMENT — PATIENT HEALTH QUESTIONNAIRE - PHQ9
2. FEELING DOWN, DEPRESSED OR HOPELESS: NOT AT ALL
SUM OF ALL RESPONSES TO PHQ9 QUESTIONS 1 AND 2: 0
1. LITTLE INTEREST OR PLEASURE IN DOING THINGS: NOT AT ALL

## 2025-01-14 NOTE — PROGRESS NOTES
Coumadin Clinic Visit Note    Patient verified warfarin dose  No missed doses  No unusual bruising or bleeding  No changes to medications  Consistent dietary green intake , was in hospital from 12/27 to 12/30 for covid , was discharged from hospital on dexamethasone 6 mg daily for 5 days   No anticipated procedures at this time  INR Therapeutic today at 2  No changes to warfarin dose today  Next appointment 5 weeks      Elke Mcdowell, PharmD

## 2025-01-14 NOTE — PATIENT INSTRUCTIONS
Plan  Continue home health for now  Family is planning to move him to assisted living in few weeks. They will bring H & P papers.   .fall prevention discussed .   Current medications are effective. advised to continue current medications.  His plan to to continue to get his medical care from me as he has been doing  even after moving to assisted living place.  F/U in April for Medicare wellness as scheduled

## 2025-01-14 NOTE — PROGRESS NOTES
"My nurse note reviewed. Patient is here for:  Gait Problem     Here for hosp discharge follow up   Pt is here with daughter in law .   Lives alone.   Family is thinking about him going to assisted Living. -- felix Mccollum.  Pt is not able to drive anymore   Pt was in hosp for 3 days with genenralized weakness and noted to have covid. He was treated with Ramdesivir and now back to home.   Uses cane for walking . \" I am getting stronger now. \"    Patient denies any shortness of breath, PND, orthopnea, chest pain , palpitation, syncope or edema in legs  patient denies any abdominal pain, tenderness, nausea, vomiting, change in bowel habits or blood in stool.  Getting home health care .   Has a lady friend 88 yrs old who comes to his home .   OBJECTIVE :  /84   Pulse 52   Wt 73.5 kg (162 lb)   SpO2 100%   BMI 23.92 kg/m²   Conj pink, no icterus  CVS: S1 S2 + , no S3. Sys heart murmur appreciated. Lungs clear, No edema  Get up and go test walks very slow with cane .   Alert, oriented.     Assessment:  1. COVID-19 infectrion . Treated at hosp. Here for hosp discharge follow up        2. General weakness  Getting home therapy . Uses cane      3. Type 2 diabetes mellitus without complication, without long-term current use of insulin (Multi)  Lab Results   Component Value Date    HGBA1C 7.8 (H) 11/05/2024     controlled        4. Atrial fibrillation, unspecified type (Multi)  Hx of. Sees Dr Rome in cardiology      5. Severe aortic stenosis  Sees Cardiologist      6. Nonrheumatic aortic valve stenosis          Plan  Continue home health for now  Family is planning to move him to assisted living in few weeks. They will bring H & P papers.   .fall prevention discussed .   Current medications are effective. advised to continue current medications.  His plan to to continue to get his medical care from me as he has been doing  even after moving to assisted living place.  F/U in April for Medicare wellness as " scheduled

## 2025-01-15 ENCOUNTER — HOME CARE VISIT (OUTPATIENT)
Dept: HOME HEALTH SERVICES | Facility: HOME HEALTH | Age: OVER 89
End: 2025-01-15
Payer: MEDICARE

## 2025-01-15 ENCOUNTER — PATIENT OUTREACH (OUTPATIENT)
Dept: PRIMARY CARE | Facility: CLINIC | Age: OVER 89
End: 2025-01-15
Payer: MEDICARE

## 2025-01-15 PROCEDURE — G0156 HHCP-SVS OF AIDE,EA 15 MIN: HCPCS | Mod: HHH

## 2025-01-15 NOTE — PROGRESS NOTES
Unable to reach patient for call back after recent hospitalization.   M with call back number for patient to call if needed   If no voicemail available call attempts x 2 were made to contact the patient to assist with any questions or concerns patient may have.      ,

## 2025-01-16 ENCOUNTER — APPOINTMENT (OUTPATIENT)
Dept: HOME HEALTH SERVICES | Facility: HOME HEALTH | Age: OVER 89
End: 2025-01-16
Payer: MEDICARE

## 2025-01-16 ENCOUNTER — HOME CARE VISIT (OUTPATIENT)
Dept: HOME HEALTH SERVICES | Facility: HOME HEALTH | Age: OVER 89
End: 2025-01-16
Payer: MEDICARE

## 2025-01-16 VITALS
DIASTOLIC BLOOD PRESSURE: 78 MMHG | SYSTOLIC BLOOD PRESSURE: 126 MMHG | OXYGEN SATURATION: 98 % | HEART RATE: 56 BPM | RESPIRATION RATE: 18 BRPM

## 2025-01-16 PROCEDURE — G0157 HHC PT ASSISTANT EA 15: HCPCS | Mod: CQ,HHH

## 2025-01-16 ASSESSMENT — BALANCE ASSESSMENTS
TURNING 360 DEGREES STEPS: 1 - CONTINUOUS STEPS
ATTEMPTS TO ARISE: 2 - ABLE TO RISE, ONE ATTEMPT
SITTING DOWN: 1 - USES ARMS OR NOT SMOOTH MOTION
NUDGED: 2 - STEADY
EYES CLOSED AT MAXIMUM POSITION NUDGED: 1 - STEADY
NUDGED SCORE: 2
STANDING BALANCE: 2 - NARROW STANCE WITHOUT SUPPORT
ARISES: 1 - ABLE, USES ARMS TO HELP
ARISING SCORE: 1
IMMEDIATE STANDING BALANCE FIRST 5 SECONDS: 2 - STEADY WITHOUT WALKER OR OTHER SUPPORT
BALANCE SCORE: 14
SITTING BALANCE: 1 - STEADY, SAFE

## 2025-01-16 ASSESSMENT — GAIT ASSESSMENTS
PATH: 1 - MILD/MODERATE DEVIATION OR USES WALKING AID
STEP CONTINUITY: 1 - STEPS APPEAR CONTINUOUS
STEP SYMMETRY: 1 - RIGHT AND LEFT STEP LENGTH APPEAR EQUAL
BALANCE AND GAIT SCORE: 22
TRUNK SCORE: 1
PATH SCORE: 1
GAIT SCORE: 8
TRUNK: 1 - NO SWAY BUT FLEXION OF KNEES OR BACK OR SPREADS ARMS WHILE WALKING
WALKING STANCE: 0 - HEELS APART
INITIATION OF GAIT IMMEDIATELY AFTER GO: 0 - ANY HESITANCY OR MULTIPLE ATTEMPTS TO START

## 2025-01-16 ASSESSMENT — ENCOUNTER SYMPTOMS
PERSON REPORTING PAIN: PATIENT
DENIES PAIN: 1

## 2025-01-22 SDOH — HEALTH STABILITY: PHYSICAL HEALTH: EXERCISE TYPE: HAS WRITTEN HEP

## 2025-01-22 ASSESSMENT — ACTIVITIES OF DAILY LIVING (ADL)
OASIS_M1830: 03
HOME_HEALTH_OASIS: 01

## 2025-02-04 PROBLEM — U07.1 COVID: Status: RESOLVED | Noted: 2024-12-28 | Resolved: 2025-02-04

## 2025-02-04 PROBLEM — U07.1 COVID-19: Status: RESOLVED | Noted: 2024-12-27 | Resolved: 2025-02-04

## 2025-02-05 ENCOUNTER — PATIENT OUTREACH (OUTPATIENT)
Dept: PRIMARY CARE | Facility: CLINIC | Age: OVER 89
End: 2025-02-05
Payer: MEDICARE

## 2025-02-05 NOTE — PROGRESS NOTES
Discharge Facility:[facility]  Discharge Diagnosis:Unspecified a-fib  Admission Date:1/17/25  Discharge Date: 2/4/25    PCP Appointment Date:No contact made. Message sent to office.  Specialist Appointment Date:Coumadin Clinic 2/18/25, TTE & Cardiology 2/27/25   Hospital Encounter and Summary Linked:No      2 call attempts made

## 2025-02-06 ENCOUNTER — TELEPHONE (OUTPATIENT)
Dept: PRIMARY CARE | Facility: CLINIC | Age: OVER 89
End: 2025-02-06
Payer: MEDICARE

## 2025-02-06 NOTE — TELEPHONE ENCOUNTER
----- Message from Nurse Uri DENIS sent at 2/6/2025  8:11 AM EST -----  Regarding: FW: TCM appt needed Thank you:-)    ----- Message -----  From: Rocío Damon LPN  Sent: 2/5/2025   2:26 PM EST  To:  [id number] [place] [id number] Clinical Support Staff  Subject: TCM appt needed                                  Discharge facility:[facility]  Discharge diagnosis:Unspecified a-fib     Date of discharge:2/4/25        Unsuccessful attempts x2 to reach patient for PCP Follow-up  Please have office staff reach out to patient and schedule an appointment within 14 days from discharge date.      Needs to be seen by 2/17/25

## 2025-02-17 ENCOUNTER — ANTICOAGULATION - WARFARIN VISIT (OUTPATIENT)
Dept: PHARMACY | Facility: CLINIC | Age: OVER 89
End: 2025-02-17
Payer: MEDICARE

## 2025-02-17 DIAGNOSIS — I48.91 ATRIAL FIBRILLATION, UNSPECIFIED TYPE (MULTI): Primary | ICD-10-CM

## 2025-02-17 DIAGNOSIS — I48.0 PAROXYSMAL ATRIAL FIBRILLATION (MULTI): ICD-10-CM

## 2025-02-17 NOTE — LETTER
2025     Dear Saul Asif,    This notice is to inform you of your discharge from the Anticoagulation Clinic, where you received warfarin management. Thank you for allowing our team to assist in the management of your anticoagulation needs. Please let us know if your health care needs change in the future.       Patient: Saul Asif   YOB: 1933   Date of Visit: 2025       Patient Discharge Notice    Dr. Matthew Tubbs,          Date: 25    The following patient is being discharged from the Anticoagulation Clinic due to now living at [facility] and being monitored there.    Patient Name Saul Asif   MRN [id number]    [birth year]   Last Visit Date 25   Last known Warfarin Regimen 2.5mg very Monday and Wednesday and 1.25mg all other days   Last known INR 2.0     Sincerely,   Keena Orourke, [facility]  Anticoagulation Clinic  [facility]

## 2025-02-18 ENCOUNTER — APPOINTMENT (OUTPATIENT)
Dept: PHARMACY | Facility: CLINIC | Age: OVER 89
End: 2025-02-18
Payer: MEDICARE

## 2025-02-19 ENCOUNTER — PATIENT OUTREACH (OUTPATIENT)
Dept: PRIMARY CARE | Facility: CLINIC | Age: OVER 89
End: 2025-02-19
Payer: MEDICARE

## 2025-02-27 ENCOUNTER — OFFICE VISIT (OUTPATIENT)
Dept: CARDIOLOGY | Facility: CLINIC | Age: OVER 89
End: 2025-02-27
Payer: MEDICARE

## 2025-02-27 ENCOUNTER — HOSPITAL ENCOUNTER (OUTPATIENT)
Dept: CARDIOLOGY | Facility: CLINIC | Age: OVER 89
Discharge: HOME | End: 2025-02-27
Payer: MEDICARE

## 2025-02-27 VITALS
DIASTOLIC BLOOD PRESSURE: 60 MMHG | HEART RATE: 88 BPM | SYSTOLIC BLOOD PRESSURE: 110 MMHG | WEIGHT: 179 LBS | BODY MASS INDEX: 26.51 KG/M2 | OXYGEN SATURATION: 98 % | HEIGHT: 69 IN

## 2025-02-27 DIAGNOSIS — E78.5 HYPERLIPIDEMIA, UNSPECIFIED HYPERLIPIDEMIA TYPE: ICD-10-CM

## 2025-02-27 DIAGNOSIS — E11.9 TYPE 2 DIABETES MELLITUS WITHOUT COMPLICATION, UNSPECIFIED WHETHER LONG TERM INSULIN USE (MULTI): ICD-10-CM

## 2025-02-27 DIAGNOSIS — I48.0 PAROXYSMAL ATRIAL FIBRILLATION (MULTI): ICD-10-CM

## 2025-02-27 DIAGNOSIS — I35.0 SEVERE AORTIC STENOSIS: Primary | ICD-10-CM

## 2025-02-27 DIAGNOSIS — I45.10 RBBB: ICD-10-CM

## 2025-02-27 DIAGNOSIS — I35.0 SEVERE AORTIC STENOSIS: ICD-10-CM

## 2025-02-27 DIAGNOSIS — K80.20 ASYMPTOMATIC CHOLELITHIASIS: ICD-10-CM

## 2025-02-27 DIAGNOSIS — I48.91 ATRIAL FIBRILLATION, UNSPECIFIED TYPE (MULTI): ICD-10-CM

## 2025-02-27 LAB
AORTIC VALVE MEAN GRADIENT: 54 MMHG
AORTIC VALVE PEAK VELOCITY: 5.32 M/S
AV PEAK GRADIENT: 113 MMHG
AVA (PEAK VEL): 0.67 CM2
AVA (VTI): 0.68 CM2
EJECTION FRACTION APICAL 4 CHAMBER: 69.9
EJECTION FRACTION: 68 %
LEFT ATRIUM VOLUME AREA LENGTH INDEX BSA: 66.8 ML/M2
LEFT VENTRICLE INTERNAL DIMENSION DIASTOLE: 4.69 CM (ref 3.5–6)
LEFT VENTRICULAR OUTFLOW TRACT DIAMETER: 2.19 CM
Q ONSET: 226 MS
QRS COUNT: 12 BEATS
QRS DURATION: 144 MS
QT INTERVAL: 444 MS
QTC CALCULATION(BAZETT): 475 MS
QTC FREDERICIA: 465 MS
R AXIS: 46 DEGREES
RIGHT VENTRICLE FREE WALL PEAK S': 0.07 CM/S
RIGHT VENTRICLE PEAK SYSTOLIC PRESSURE: 38.4 MMHG
T AXIS: -14 DEGREES
T OFFSET: 448 MS
TRICUSPID ANNULAR PLANE SYSTOLIC EXCURSION: 2 CM
VENTRICULAR RATE: 69 BPM

## 2025-02-27 PROCEDURE — 99215 OFFICE O/P EST HI 40 MIN: CPT | Performed by: INTERNAL MEDICINE

## 2025-02-27 PROCEDURE — 93306 TTE W/DOPPLER COMPLETE: CPT

## 2025-02-27 PROCEDURE — 1160F RVW MEDS BY RX/DR IN RCRD: CPT | Performed by: INTERNAL MEDICINE

## 2025-02-27 PROCEDURE — 93306 TTE W/DOPPLER COMPLETE: CPT | Performed by: INTERNAL MEDICINE

## 2025-02-27 PROCEDURE — 1159F MED LIST DOCD IN RCRD: CPT | Performed by: INTERNAL MEDICINE

## 2025-02-27 PROCEDURE — 93010 ELECTROCARDIOGRAM REPORT: CPT | Performed by: INTERNAL MEDICINE

## 2025-02-27 PROCEDURE — 1036F TOBACCO NON-USER: CPT | Performed by: INTERNAL MEDICINE

## 2025-02-27 PROCEDURE — 99215 OFFICE O/P EST HI 40 MIN: CPT | Mod: 25 | Performed by: INTERNAL MEDICINE

## 2025-02-27 PROCEDURE — 93005 ELECTROCARDIOGRAM TRACING: CPT | Performed by: INTERNAL MEDICINE

## 2025-02-27 RX ORDER — TRAZODONE HYDROCHLORIDE 50 MG/1
TABLET ORAL
COMMUNITY
Start: 2025-02-21

## 2025-02-27 RX ORDER — INSULIN LISPRO 100 [IU]/ML
INJECTION, SOLUTION INTRAVENOUS; SUBCUTANEOUS
COMMUNITY
Start: 2025-02-06

## 2025-02-27 RX ORDER — ATORVASTATIN CALCIUM 10 MG/1
10 TABLET, FILM COATED ORAL NIGHTLY
Qty: 90 TABLET | Refills: 3 | Status: SHIPPED | OUTPATIENT
Start: 2025-02-27 | End: 2026-02-27

## 2025-02-27 RX ORDER — TORSEMIDE 5 MG/1
5 TABLET ORAL DAILY
Qty: 90 TABLET | Refills: 3 | Status: SHIPPED | OUTPATIENT
Start: 2025-02-27 | End: 2026-02-27

## 2025-02-27 RX ORDER — PANTOPRAZOLE SODIUM 40 MG/1
40 TABLET, DELAYED RELEASE ORAL DAILY
Qty: 90 TABLET | Refills: 3 | Status: SHIPPED | OUTPATIENT
Start: 2025-02-27

## 2025-02-27 NOTE — ASSESSMENT & PLAN NOTE
2/27/25 echocqrdiogram svere aortic stenosis and moderate AI LVEF = 65-70%, severe MAC with moderate MR,  Tr with midly elevated RVSP (reviewed today)

## 2025-02-27 NOTE — PROGRESS NOTES
"  Subjective  Saul Asif  is a 89 y.o. year old male who presents for F/U severe aortic senosis and pagrial fibrillation. Hospitalized [facility] for COVID discharged 12/30/24    Blood pressure 110/60, pulse 88, height 1.753 m (5' 9\"), weight 81.2 kg (179 lb), SpO2 98%.   Patient has no known allergies.  Past Medical History:   Diagnosis Date    Diabetes mellitus (Multi)      History reviewed. No pertinent surgical history.  Family History   Problem Relation Name Age of Onset    No Known Problems Mother      No Known Problems Father       @SOC    Current Outpatient Medications   Medication Sig Dispense Refill    HumaLOG KwikPen Insulin 100 unit/mL injection       traZODone (Desyrel) 50 mg tablet       albuterol 90 mcg/actuation inhaler Inhale 2 puffs every 6 hours if needed for wheezing or shortness of breath. 6.7 g 11    atorvastatin (Lipitor) 10 mg tablet Take 1 tablet (10 mg) by mouth once daily at bedtime. 90 tablet 3    azelaic acid (Finacea) 15 % gel Apply 1 Application topically once daily in the morning.      blood sugar diagnostic (Blood Glucose Test) strip 1 strip by abdominal subcutaneous route 2 times a day. twice a day. (Patient taking differently: Inject 1 strip under the skin 2 times a day.) 200 strip 3    Combigan 0.2-0.5 % ophthalmic solution Administer 1 drop into both eyes every 12 hours.      ferrous sulfate 325 (65 Fe) MG tablet Take 1 tablet (325 mg) by mouth 2 times a day.      glipiZIDE (Glucotrol) 5 mg tablet Take 1 tablet (5 mg) by mouth once daily in the morning. Take before meals.      insulin degludec (Tresiba FlexTouch U-100) 100 unit/mL (3 mL) injection Inject 8 Units under the skin once daily in the morning. 3 mL 3    levothyroxine (Synthroid, Levoxyl) 100 mcg tablet TAKE 1 TABLET BY MOUTH ONCE  DAILY (Patient taking differently: Take 1 tablet (100 mcg) by mouth early in the morning..) 90 tablet 3    levothyroxine (Synthroid, Levoxyl) 100 mcg tablet Take 1 tablet (100 mcg) by mouth once " "daily. (Patient taking differently: Take 1 tablet (100 mcg) by mouth early in the morning..) 90 tablet 3    metroNIDAZOLE (Metrocream) 0.75 % cream Apply 1 Application topically once daily at bedtime. Apply to face      oxymetazoline (Afrin, oxymetazoline,) 0.05 % nasal spray Administer 2 sprays into each nostril every 12 hours if needed (nose bleed).      pantoprazole (ProtoNix) 40 mg EC tablet Take 1 tablet (40 mg) by mouth once daily. 90 tablet 3    pen needle, diabetic (BD Ultra-Fine Mini Pen Needle) 31 gauge x 3/16\" needle TEST BLOOD SUGAR TWICE A  each 3    polyethylene glycol (Glycolax, Miralax) 17 gram packet Take 17 g by mouth once daily. 30 packet 11    psyllium husk, aspartame, (Metamucil Sugar-Free, aspart,) 3.4 gram/5.8 gram powder Take 1 packet by mouth twice a day.      sennosides-docusate sodium (Elaine-Colace) 8.6-50 mg tablet Take 2 tablets by mouth once daily. 60 tablet 11    simethicone (Mylicon) 80 mg chewable tablet Chew 1 tablet (80 mg) every 6 hours if needed for flatulence. 30 tablet 11    torsemide (Demadex) 5 mg tablet Take 1 tablet (5 mg) by mouth once daily. 90 tablet 3    Vitamin D2 1,250 mcg (50,000 unit) capsule Take 1 capsule (50,000 Units) by mouth every 30 (thirty) days.      warfarin (Coumadin) 2.5 mg tablet Take 1 tablet (2.5 mg) by mouth once daily at bedtime. (Patient taking differently: Take 1 tablet (2.5 mg) by mouth once daily at bedtime. 2.5 mg every Mon, Wed; 1.25 mg all other days) 90 tablet 3    warfarin (Coumadin) 2.5 mg tablet Take 1 tablet (2.5 mg) by mouth 5 times a week. Every Sunday, Tuesday, Thursday, Friday and Saturday in the evening  Take as directed per After Visit Summary.       No current facility-administered medications for this visit.        ROS  Review of Systems    Physical Exam  Physical Exam  Constitutional:       Appearance: Normal appearance.   HENT:      Head: Normocephalic.   Cardiovascular:      Rate and Rhythm: Normal rate. Rhythm " irregularly irregular.      Heart sounds: Murmur heard.      Crescendo decrescendo systolic murmur is present with a grade of 4/6.   Pulmonary:      Effort: Pulmonary effort is normal.      Breath sounds: Normal breath sounds.   Abdominal:      General: Abdomen is flat.   Skin:     General: Skin is warm and dry.   Neurological:      General: No focal deficit present.      Mental Status: He is alert and oriented to person, place, and time.   Psychiatric:         Mood and Affect: Mood normal.         Behavior: Behavior normal.          EKG  Encounter Date: 02/27/25   ECG 12 Lead   Result Value    Ventricular Rate 69    QRS Duration 144    QT Interval 444    QTC Calculation(Bazett) 475    R Axis 46    T Axis -14    QRS Count 12    Q Onset 226    T Offset 448    QTC Fredericia 465    Narrative    Atrial fibrillation with premature ventricular or aberrantly conducted complexes  Right bundle branch block  Cannot rule out Inferior infarct , age undetermined  Abnormal ECG  When compared with ECG of 27-DEC-2024 12:29,  Previous ECG has undetermined rhythm, needs review  QRS axis Shifted left  Minimal criteria for Inferior infarct are now Present  Inverted T waves have replaced nonspecific T wave abnormality in Inferior leads  Confirmed by Matthew Tubbs ([id number]) on 2/27/2025 1:32:44 PM       Problem List Items Addressed This Visit       Asymptomatic cholelithiasis    Relevant Medications    pantoprazole (ProtoNix) 40 mg EC tablet    Atrial fibrillation (Multi)    Relevant Medications    torsemide (Demadex) 5 mg tablet    Other Relevant Orders    ECG 12 Lead (Completed)    Transthoracic Echo (TTE) Complete    Follow Up In Cardiology    Diabetes mellitus (Multi)    Hyperlipidemia    Relevant Medications    atorvastatin (Lipitor) 10 mg tablet    Severe aortic stenosis - Primary     2/27/25 echocqrdiogram svere aortic stenosis and moderate AI LVEF = 65-70%, severe MAC with moderate MR,  Tr with midly elevated RVSP (reviewed  today)         Relevant Orders    Transthoracic Echo (TTE) Complete    Transthoracic Echo (TTE) Complete    Follow Up In Cardiology    Paroxysmal atrial fibrillation (Multi)    Relevant Orders    ECG 12 Lead (Completed)    Transthoracic Echo (TTE) Complete    Follow Up In Cardiology    RBBB    Relevant Orders    Transthoracic Echo (TTE) Complete    Follow Up In Cardiology         Decrease diuretic t 1/2/ dose  Return 6 months with EKG and echocafdiogram        Matthew Tubbs MD

## 2025-04-22 ENCOUNTER — APPOINTMENT (OUTPATIENT)
Dept: PRIMARY CARE | Facility: CLINIC | Age: OVER 89
End: 2025-04-22
Payer: MEDICARE

## 2025-05-03 ENCOUNTER — APPOINTMENT (OUTPATIENT)
Dept: CARDIOLOGY | Facility: HOSPITAL | Age: OVER 89
DRG: 377 | End: 2025-05-03
Payer: MEDICARE

## 2025-05-03 ENCOUNTER — APPOINTMENT (OUTPATIENT)
Dept: RADIOLOGY | Facility: HOSPITAL | Age: OVER 89
DRG: 377 | End: 2025-05-03
Payer: MEDICARE

## 2025-05-03 ENCOUNTER — LAB REQUISITION (OUTPATIENT)
Dept: LAB | Facility: HOSPITAL | Age: OVER 89
DRG: 377 | End: 2025-05-03
Payer: MEDICARE

## 2025-05-03 ENCOUNTER — HOSPITAL ENCOUNTER (INPATIENT)
Facility: HOSPITAL | Age: OVER 89
End: 2025-05-03
Attending: EMERGENCY MEDICINE | Admitting: INTERNAL MEDICINE
Payer: MEDICARE

## 2025-05-03 DIAGNOSIS — Z79.01 ON WARFARIN FOR ATRIAL FIBRILLATION (MULTI): ICD-10-CM

## 2025-05-03 DIAGNOSIS — I35.0 SEVERE AORTIC STENOSIS: ICD-10-CM

## 2025-05-03 DIAGNOSIS — R19.5 DARK STOOLS: ICD-10-CM

## 2025-05-03 DIAGNOSIS — I48.91 ON WARFARIN FOR ATRIAL FIBRILLATION (MULTI): ICD-10-CM

## 2025-05-03 DIAGNOSIS — R79.1 ELEVATED PROTIME: ICD-10-CM

## 2025-05-03 DIAGNOSIS — R06.02 SHORTNESS OF BREATH: ICD-10-CM

## 2025-05-03 DIAGNOSIS — E16.2 HYPOGLYCEMIA, UNSPECIFIED: ICD-10-CM

## 2025-05-03 DIAGNOSIS — J18.9 PNEUMONIA OF BOTH LOWER LOBES DUE TO INFECTIOUS ORGANISM: ICD-10-CM

## 2025-05-03 DIAGNOSIS — D64.9 ANEMIA, UNSPECIFIED TYPE: Primary | ICD-10-CM

## 2025-05-03 PROBLEM — R53.1 GENERALIZED WEAKNESS: Status: ACTIVE | Noted: 2025-05-03

## 2025-05-03 PROBLEM — R05.8 PRODUCTIVE COUGH: Status: ACTIVE | Noted: 2025-05-03

## 2025-05-03 LAB
ABO GROUP (TYPE) IN BLOOD: NORMAL
ABO GROUP (TYPE) IN BLOOD: NORMAL
ALBUMIN SERPL BCP-MCNC: 3.6 G/DL (ref 3.4–5)
ALP SERPL-CCNC: 65 U/L (ref 33–136)
ALT SERPL W P-5'-P-CCNC: 20 U/L (ref 10–52)
ANION GAP SERPL CALC-SCNC: 12 MMOL/L (ref 10–20)
ANION GAP SERPL CALC-SCNC: 13 MMOL/L (ref 10–20)
ANTIBODY SCREEN: NORMAL
AST SERPL W P-5'-P-CCNC: 17 U/L (ref 9–39)
BASOPHILS # BLD AUTO: 0.04 X10*3/UL (ref 0–0.1)
BASOPHILS # BLD AUTO: 0.05 X10*3/UL (ref 0–0.1)
BASOPHILS NFR BLD AUTO: 0.4 %
BASOPHILS NFR BLD AUTO: 0.5 %
BILIRUB SERPL-MCNC: 0.3 MG/DL (ref 0–1.2)
BUN SERPL-MCNC: 54 MG/DL (ref 6–23)
BUN SERPL-MCNC: 55 MG/DL (ref 6–23)
CALCIUM SERPL-MCNC: 8.7 MG/DL (ref 8.6–10.3)
CALCIUM SERPL-MCNC: 9.6 MG/DL (ref 8.6–10.3)
CARDIAC TROPONIN I PNL SERPL HS: 13 NG/L (ref 0–20)
CHLORIDE SERPL-SCNC: 103 MMOL/L (ref 98–107)
CHLORIDE SERPL-SCNC: 105 MMOL/L (ref 98–107)
CO2 SERPL-SCNC: 28 MMOL/L (ref 21–32)
CO2 SERPL-SCNC: 28 MMOL/L (ref 21–32)
CREAT SERPL-MCNC: 1.6 MG/DL (ref 0.5–1.3)
CREAT SERPL-MCNC: 1.62 MG/DL (ref 0.5–1.3)
EGFRCR SERPLBLD CKD-EPI 2021: 40 ML/MIN/1.73M*2
EGFRCR SERPLBLD CKD-EPI 2021: 40 ML/MIN/1.73M*2
EOSINOPHIL # BLD AUTO: 0.2 X10*3/UL (ref 0–0.4)
EOSINOPHIL # BLD AUTO: 0.22 X10*3/UL (ref 0–0.4)
EOSINOPHIL NFR BLD AUTO: 2.1 %
EOSINOPHIL NFR BLD AUTO: 2.2 %
ERYTHROCYTE [DISTWIDTH] IN BLOOD BY AUTOMATED COUNT: 15.1 % (ref 11.5–14.5)
ERYTHROCYTE [DISTWIDTH] IN BLOOD BY AUTOMATED COUNT: 15.3 % (ref 11.5–14.5)
FLUAV RNA RESP QL NAA+PROBE: NOT DETECTED
FLUBV RNA RESP QL NAA+PROBE: NOT DETECTED
GLUCOSE BLD MANUAL STRIP-MCNC: 279 MG/DL (ref 74–99)
GLUCOSE SERPL-MCNC: 240 MG/DL (ref 74–99)
GLUCOSE SERPL-MCNC: 280 MG/DL (ref 74–99)
HCT VFR BLD AUTO: 20.8 % (ref 41–52)
HCT VFR BLD AUTO: 21 % (ref 41–52)
HGB BLD-MCNC: 6.3 G/DL (ref 13.5–17.5)
HGB BLD-MCNC: 6.3 G/DL (ref 13.5–17.5)
IMM GRANULOCYTES # BLD AUTO: 0.07 X10*3/UL (ref 0–0.5)
IMM GRANULOCYTES # BLD AUTO: 0.1 X10*3/UL (ref 0–0.5)
IMM GRANULOCYTES NFR BLD AUTO: 0.7 % (ref 0–0.9)
IMM GRANULOCYTES NFR BLD AUTO: 1.1 % (ref 0–0.9)
INR PPP: ABNORMAL
LYMPHOCYTES # BLD AUTO: 1.02 X10*3/UL (ref 0.8–3)
LYMPHOCYTES # BLD AUTO: 1.19 X10*3/UL (ref 0.8–3)
LYMPHOCYTES NFR BLD AUTO: 10.8 %
LYMPHOCYTES NFR BLD AUTO: 11.9 %
MAGNESIUM SERPL-MCNC: 1.93 MG/DL (ref 1.6–2.4)
MCH RBC QN AUTO: 30.6 PG (ref 26–34)
MCH RBC QN AUTO: 31 PG (ref 26–34)
MCHC RBC AUTO-ENTMCNC: 30 G/DL (ref 32–36)
MCHC RBC AUTO-ENTMCNC: 30.3 G/DL (ref 32–36)
MCV RBC AUTO: 102 FL (ref 80–100)
MCV RBC AUTO: 103 FL (ref 80–100)
MONOCYTES # BLD AUTO: 0.6 X10*3/UL (ref 0.05–0.8)
MONOCYTES # BLD AUTO: 0.62 X10*3/UL (ref 0.05–0.8)
MONOCYTES NFR BLD AUTO: 6.2 %
MONOCYTES NFR BLD AUTO: 6.3 %
NEUTROPHILS # BLD AUTO: 7.48 X10*3/UL (ref 1.6–5.5)
NEUTROPHILS # BLD AUTO: 7.89 X10*3/UL (ref 1.6–5.5)
NEUTROPHILS NFR BLD AUTO: 78.6 %
NEUTROPHILS NFR BLD AUTO: 79.2 %
NRBC BLD-RTO: 0.2 /100 WBCS (ref 0–0)
NRBC BLD-RTO: 0.2 /100 WBCS (ref 0–0)
PLATELET # BLD AUTO: 215 X10*3/UL (ref 150–450)
PLATELET # BLD AUTO: 241 X10*3/UL (ref 150–450)
POTASSIUM SERPL-SCNC: 4.3 MMOL/L (ref 3.5–5.3)
POTASSIUM SERPL-SCNC: 4.6 MMOL/L (ref 3.5–5.3)
PROT SERPL-MCNC: 6.3 G/DL (ref 6.4–8.2)
PROTHROMBIN TIME: >100 SECONDS (ref 9.8–12.4)
RBC # BLD AUTO: 2.03 X10*6/UL (ref 4.5–5.9)
RBC # BLD AUTO: 2.06 X10*6/UL (ref 4.5–5.9)
RH FACTOR (ANTIGEN D): NORMAL
RH FACTOR (ANTIGEN D): NORMAL
RSV RNA RESP QL NAA+PROBE: NOT DETECTED
SARS-COV-2 RNA RESP QL NAA+PROBE: NOT DETECTED
SODIUM SERPL-SCNC: 139 MMOL/L (ref 136–145)
SODIUM SERPL-SCNC: 141 MMOL/L (ref 136–145)
WBC # BLD AUTO: 10 X10*3/UL (ref 4.4–11.3)
WBC # BLD AUTO: 9.5 X10*3/UL (ref 4.4–11.3)

## 2025-05-03 PROCEDURE — G0378 HOSPITAL OBSERVATION PER HR: HCPCS

## 2025-05-03 PROCEDURE — 99223 1ST HOSP IP/OBS HIGH 75: CPT | Performed by: PHYSICIAN ASSISTANT

## 2025-05-03 PROCEDURE — 2500000002 HC RX 250 W HCPCS SELF ADMINISTERED DRUGS (ALT 637 FOR MEDICARE OP, ALT 636 FOR OP/ED): Performed by: PHYSICIAN ASSISTANT

## 2025-05-03 PROCEDURE — 36415 COLL VENOUS BLD VENIPUNCTURE: CPT | Performed by: NURSE PRACTITIONER

## 2025-05-03 PROCEDURE — 84484 ASSAY OF TROPONIN QUANT: CPT | Performed by: NURSE PRACTITIONER

## 2025-05-03 PROCEDURE — 85025 COMPLETE CBC W/AUTO DIFF WBC: CPT | Performed by: NURSE PRACTITIONER

## 2025-05-03 PROCEDURE — P9016 RBC LEUKOCYTES REDUCED: HCPCS

## 2025-05-03 PROCEDURE — 71045 X-RAY EXAM CHEST 1 VIEW: CPT | Performed by: STUDENT IN AN ORGANIZED HEALTH CARE EDUCATION/TRAINING PROGRAM

## 2025-05-03 PROCEDURE — 85610 PROTHROMBIN TIME: CPT | Performed by: NURSE PRACTITIONER

## 2025-05-03 PROCEDURE — 80048 BASIC METABOLIC PNL TOTAL CA: CPT | Mod: OUT | Performed by: INTERNAL MEDICINE

## 2025-05-03 PROCEDURE — 83735 ASSAY OF MAGNESIUM: CPT | Performed by: PHYSICIAN ASSISTANT

## 2025-05-03 PROCEDURE — 86901 BLOOD TYPING SEROLOGIC RH(D): CPT | Performed by: NURSE PRACTITIONER

## 2025-05-03 PROCEDURE — 87899 AGENT NOS ASSAY W/OPTIC: CPT | Mod: PARLAB | Performed by: PHYSICIAN ASSISTANT

## 2025-05-03 PROCEDURE — 86923 COMPATIBILITY TEST ELECTRIC: CPT

## 2025-05-03 PROCEDURE — 85025 COMPLETE CBC W/AUTO DIFF WBC: CPT | Mod: OUT | Performed by: INTERNAL MEDICINE

## 2025-05-03 PROCEDURE — 82947 ASSAY GLUCOSE BLOOD QUANT: CPT

## 2025-05-03 PROCEDURE — 2500000004 HC RX 250 GENERAL PHARMACY W/ HCPCS (ALT 636 FOR OP/ED): Mod: JZ | Performed by: PHYSICIAN ASSISTANT

## 2025-05-03 PROCEDURE — 87449 NOS EACH ORGANISM AG IA: CPT | Mod: PARLAB | Performed by: PHYSICIAN ASSISTANT

## 2025-05-03 PROCEDURE — 2500000001 HC RX 250 WO HCPCS SELF ADMINISTERED DRUGS (ALT 637 FOR MEDICARE OP): Performed by: PHYSICIAN ASSISTANT

## 2025-05-03 PROCEDURE — 80053 COMPREHEN METABOLIC PANEL: CPT | Performed by: NURSE PRACTITIONER

## 2025-05-03 PROCEDURE — 71045 X-RAY EXAM CHEST 1 VIEW: CPT

## 2025-05-03 PROCEDURE — 87637 SARSCOV2&INF A&B&RSV AMP PRB: CPT | Performed by: PHYSICIAN ASSISTANT

## 2025-05-03 PROCEDURE — 93005 ELECTROCARDIOGRAM TRACING: CPT

## 2025-05-03 PROCEDURE — 84145 PROCALCITONIN (PCT): CPT | Mod: PARLAB | Performed by: PHYSICIAN ASSISTANT

## 2025-05-03 PROCEDURE — 99285 EMERGENCY DEPT VISIT HI MDM: CPT | Mod: 25 | Performed by: EMERGENCY MEDICINE

## 2025-05-03 PROCEDURE — 36430 TRANSFUSION BLD/BLD COMPNT: CPT

## 2025-05-03 RX ORDER — CHOLECALCIFEROL (VITAMIN D3) 25 MCG
25 TABLET ORAL DAILY
Status: DISPENSED | OUTPATIENT
Start: 2025-05-03

## 2025-05-03 RX ORDER — FERROUS SULFATE 325(65) MG
1 TABLET ORAL NIGHTLY
Status: DISPENSED | OUTPATIENT
Start: 2025-05-03

## 2025-05-03 RX ORDER — CEFTRIAXONE 2 G/50ML
2 INJECTION, SOLUTION INTRAVENOUS EVERY 24 HOURS
Status: DISPENSED | OUTPATIENT
Start: 2025-05-03

## 2025-05-03 RX ORDER — ATORVASTATIN CALCIUM 10 MG/1
10 TABLET, FILM COATED ORAL NIGHTLY
Status: DISPENSED | OUTPATIENT
Start: 2025-05-03

## 2025-05-03 RX ORDER — TRAZODONE HYDROCHLORIDE 50 MG/1
25 TABLET ORAL NIGHTLY
Status: DISPENSED | OUTPATIENT
Start: 2025-05-03

## 2025-05-03 RX ORDER — INSULIN ASPART 100 [IU]/ML
0-10 INJECTION, SOLUTION INTRAVENOUS; SUBCUTANEOUS
Status: ON HOLD | COMMUNITY

## 2025-05-03 RX ORDER — CHOLECALCIFEROL (VITAMIN D3) 25 MCG
25 TABLET ORAL DAILY
Status: ON HOLD | COMMUNITY

## 2025-05-03 RX ORDER — ACETAMINOPHEN AND DIPHENHYDRAMINE HYDROCHLORIDE 500; 25 MG/1; MG/1
1 TABLET, FILM COATED ORAL NIGHTLY
Status: ON HOLD | COMMUNITY

## 2025-05-03 RX ORDER — PANTOPRAZOLE SODIUM 40 MG/10ML
40 INJECTION, POWDER, LYOPHILIZED, FOR SOLUTION INTRAVENOUS DAILY
Status: DISPENSED | OUTPATIENT
Start: 2025-05-03

## 2025-05-03 RX ORDER — IPRATROPIUM BROMIDE AND ALBUTEROL SULFATE 2.5; .5 MG/3ML; MG/3ML
3 SOLUTION RESPIRATORY (INHALATION) EVERY 4 HOURS PRN
Status: DISCONTINUED | OUTPATIENT
Start: 2025-05-03 | End: 2025-05-03

## 2025-05-03 RX ORDER — INSULIN DEGLUDEC 100 U/ML
8 INJECTION, SOLUTION SUBCUTANEOUS NIGHTLY
Status: DISCONTINUED | OUTPATIENT
Start: 2025-05-03 | End: 2025-05-03

## 2025-05-03 RX ORDER — DEXTROSE 50 % IN WATER (D50W) INTRAVENOUS SYRINGE
25
Status: ACTIVE | OUTPATIENT
Start: 2025-05-03

## 2025-05-03 RX ORDER — DOCUSATE SODIUM 100 MG/1
100 CAPSULE, LIQUID FILLED ORAL NIGHTLY
Status: ON HOLD | COMMUNITY

## 2025-05-03 RX ORDER — LEVOTHYROXINE SODIUM 100 UG/1
100 TABLET ORAL
Status: DISPENSED | OUTPATIENT
Start: 2025-05-03

## 2025-05-03 RX ORDER — ACETAMINOPHEN 160 MG/5ML
650 SOLUTION ORAL EVERY 4 HOURS PRN
Status: ACTIVE | OUTPATIENT
Start: 2025-05-03

## 2025-05-03 RX ORDER — INSULIN GLARGINE 100 [IU]/ML
10 INJECTION, SOLUTION SUBCUTANEOUS NIGHTLY
Status: DISPENSED | OUTPATIENT
Start: 2025-05-03

## 2025-05-03 RX ORDER — PNV NO.95/FERROUS FUM/FOLIC AC 28MG-0.8MG
1 TABLET ORAL 3 TIMES DAILY
Status: ON HOLD | COMMUNITY

## 2025-05-03 RX ORDER — ACETAMINOPHEN 500 MG
5 TABLET ORAL NIGHTLY PRN
Status: ON HOLD | COMMUNITY

## 2025-05-03 RX ORDER — BRIMONIDINE TARTRATE 2 MG/ML
1 SOLUTION/ DROPS OPHTHALMIC 2 TIMES DAILY
Status: DISPENSED | OUTPATIENT
Start: 2025-05-03

## 2025-05-03 RX ORDER — GUAIFENESIN 600 MG/1
600 TABLET, EXTENDED RELEASE ORAL 2 TIMES DAILY PRN
Status: DISCONTINUED | OUTPATIENT
Start: 2025-05-03 | End: 2025-05-04

## 2025-05-03 RX ORDER — IPRATROPIUM BROMIDE AND ALBUTEROL SULFATE 2.5; .5 MG/3ML; MG/3ML
3 SOLUTION RESPIRATORY (INHALATION) EVERY 2 HOUR PRN
Status: ACTIVE | OUTPATIENT
Start: 2025-05-03

## 2025-05-03 RX ORDER — INSULIN LISPRO 100 [IU]/ML
0-10 INJECTION, SOLUTION INTRAVENOUS; SUBCUTANEOUS
Status: ACTIVE | OUTPATIENT
Start: 2025-05-03

## 2025-05-03 RX ORDER — PANTOPRAZOLE SODIUM 40 MG/1
40 TABLET, DELAYED RELEASE ORAL DAILY
Status: DISCONTINUED | OUTPATIENT
Start: 2025-05-03 | End: 2025-05-03

## 2025-05-03 RX ORDER — TORSEMIDE 20 MG/1
20 TABLET ORAL DAILY
Status: DISPENSED | OUTPATIENT
Start: 2025-05-03

## 2025-05-03 RX ORDER — ACETAMINOPHEN 500 MG
5 TABLET ORAL NIGHTLY PRN
Status: ACTIVE | OUTPATIENT
Start: 2025-05-03

## 2025-05-03 RX ORDER — ACETAMINOPHEN 325 MG/1
650 TABLET ORAL EVERY 4 HOURS PRN
Status: ACTIVE | OUTPATIENT
Start: 2025-05-03

## 2025-05-03 RX ORDER — WARFARIN 2.5 MG/1
2.5 TABLET ORAL DAILY
Status: ACTIVE | OUTPATIENT
Start: 2025-05-03

## 2025-05-03 RX ORDER — DEXTROSE 50 % IN WATER (D50W) INTRAVENOUS SYRINGE
12.5
Status: ACTIVE | OUTPATIENT
Start: 2025-05-03

## 2025-05-03 RX ORDER — TORSEMIDE 20 MG/1
20 TABLET ORAL DAILY
Status: ON HOLD | COMMUNITY

## 2025-05-03 RX ORDER — BRIMONIDINE TARTRATE 2 MG/ML
1 SOLUTION/ DROPS OPHTHALMIC 2 TIMES DAILY
Status: ON HOLD | COMMUNITY

## 2025-05-03 RX ORDER — DOCUSATE SODIUM 100 MG/1
100 CAPSULE, LIQUID FILLED ORAL NIGHTLY
Status: DISPENSED | OUTPATIENT
Start: 2025-05-03

## 2025-05-03 RX ORDER — ALUMINUM HYDROXIDE, MAGNESIUM HYDROXIDE, AND SIMETHICONE 1200; 120; 1200 MG/30ML; MG/30ML; MG/30ML
30 SUSPENSION ORAL EVERY 6 HOURS PRN
Status: ON HOLD | COMMUNITY

## 2025-05-03 RX ORDER — ACETAMINOPHEN 650 MG/1
650 SUPPOSITORY RECTAL EVERY 4 HOURS PRN
Status: ACTIVE | OUTPATIENT
Start: 2025-05-03

## 2025-05-03 RX ADMIN — GUAIFENESIN 600 MG: 600 TABLET, EXTENDED RELEASE ORAL at 17:58

## 2025-05-03 RX ADMIN — TORSEMIDE 20 MG: 20 TABLET ORAL at 17:58

## 2025-05-03 RX ADMIN — Medication 25 MCG: at 17:58

## 2025-05-03 RX ADMIN — INSULIN LISPRO 2 UNITS: 100 INJECTION, SOLUTION INTRAVENOUS; SUBCUTANEOUS at 17:56

## 2025-05-03 RX ADMIN — CEFTRIAXONE SODIUM 2 G: 2 INJECTION, SOLUTION INTRAVENOUS at 20:54

## 2025-05-03 RX ADMIN — TRAZODONE HYDROCHLORIDE 25 MG: 50 TABLET ORAL at 20:54

## 2025-05-03 RX ADMIN — ATORVASTATIN CALCIUM 10 MG: 10 TABLET, FILM COATED ORAL at 20:54

## 2025-05-03 RX ADMIN — PHYTONADIONE 5 MG: 10 INJECTION, EMULSION INTRAMUSCULAR; INTRAVENOUS; SUBCUTANEOUS at 17:20

## 2025-05-03 RX ADMIN — AZITHROMYCIN DIHYDRATE 500 MG: 500 INJECTION, POWDER, LYOPHILIZED, FOR SOLUTION INTRAVENOUS at 20:54

## 2025-05-03 RX ADMIN — BRIMONIDINE TARTRATE 1 DROP: 2 SOLUTION/ DROPS OPHTHALMIC at 20:55

## 2025-05-03 RX ADMIN — INSULIN GLARGINE 10 UNITS: 100 INJECTION, SOLUTION SUBCUTANEOUS at 20:52

## 2025-05-03 RX ADMIN — DOCUSATE SODIUM 100 MG: 100 CAPSULE, LIQUID FILLED ORAL at 20:54

## 2025-05-03 RX ADMIN — FERROUS SULFATE TAB 325 MG (65 MG ELEMENTAL FE) 325 MG: 325 (65 FE) TAB at 20:54

## 2025-05-03 SDOH — SOCIAL STABILITY: SOCIAL INSECURITY: ABUSE: ADULT

## 2025-05-03 SDOH — ECONOMIC STABILITY: FOOD INSECURITY: WITHIN THE PAST 12 MONTHS, YOU WORRIED THAT YOUR FOOD WOULD RUN OUT BEFORE YOU GOT THE MONEY TO BUY MORE.: NEVER TRUE

## 2025-05-03 SDOH — SOCIAL STABILITY: SOCIAL NETWORK: HOW OFTEN DO YOU GET TOGETHER WITH FRIENDS OR RELATIVES?: ONCE A WEEK

## 2025-05-03 SDOH — SOCIAL STABILITY: SOCIAL INSECURITY: WITHIN THE LAST YEAR, HAVE YOU BEEN AFRAID OF YOUR PARTNER OR EX-PARTNER?: NO

## 2025-05-03 SDOH — SOCIAL STABILITY: SOCIAL NETWORK: HOW OFTEN DO YOU ATTEND MEETINGS OF THE CLUBS OR ORGANIZATIONS YOU BELONG TO?: NEVER

## 2025-05-03 SDOH — SOCIAL STABILITY: SOCIAL INSECURITY: DO YOU FEEL ANYONE HAS EXPLOITED OR TAKEN ADVANTAGE OF YOU FINANCIALLY OR OF YOUR PERSONAL PROPERTY?: NO

## 2025-05-03 SDOH — ECONOMIC STABILITY: FOOD INSECURITY: WITHIN THE PAST 12 MONTHS, THE FOOD YOU BOUGHT JUST DIDN'T LAST AND YOU DIDN'T HAVE MONEY TO GET MORE.: NEVER TRUE

## 2025-05-03 SDOH — SOCIAL STABILITY: SOCIAL NETWORK
DO YOU BELONG TO ANY CLUBS OR ORGANIZATIONS SUCH AS CHURCH GROUPS, UNIONS, FRATERNAL OR ATHLETIC GROUPS, OR SCHOOL GROUPS?: NO

## 2025-05-03 SDOH — SOCIAL STABILITY: SOCIAL INSECURITY: ARE YOU OR HAVE YOU BEEN THREATENED OR ABUSED PHYSICALLY, EMOTIONALLY, OR SEXUALLY BY ANYONE?: NO

## 2025-05-03 SDOH — SOCIAL STABILITY: SOCIAL INSECURITY: WERE YOU ABLE TO COMPLETE ALL THE BEHAVIORAL HEALTH SCREENINGS?: YES

## 2025-05-03 SDOH — SOCIAL STABILITY: SOCIAL NETWORK: HOW OFTEN DO YOU ATTEND CHURCH OR RELIGIOUS SERVICES?: NEVER

## 2025-05-03 SDOH — HEALTH STABILITY: PHYSICAL HEALTH
HOW OFTEN DO YOU NEED TO HAVE SOMEONE HELP YOU WHEN YOU READ INSTRUCTIONS, PAMPHLETS, OR OTHER WRITTEN MATERIAL FROM YOUR DOCTOR OR PHARMACY?: SOMETIMES

## 2025-05-03 SDOH — ECONOMIC STABILITY: INCOME INSECURITY: IN THE PAST 12 MONTHS HAS THE ELECTRIC, GAS, OIL, OR WATER COMPANY THREATENED TO SHUT OFF SERVICES IN YOUR HOME?: NO

## 2025-05-03 SDOH — HEALTH STABILITY: PHYSICAL HEALTH: ON AVERAGE, HOW MANY MINUTES DO YOU ENGAGE IN EXERCISE AT THIS LEVEL?: 0 MIN

## 2025-05-03 SDOH — SOCIAL STABILITY: SOCIAL INSECURITY: WITHIN THE LAST YEAR, HAVE YOU BEEN HUMILIATED OR EMOTIONALLY ABUSED IN OTHER WAYS BY YOUR PARTNER OR EX-PARTNER?: NO

## 2025-05-03 SDOH — SOCIAL STABILITY: SOCIAL INSECURITY: ARE YOU MARRIED, WIDOWED, DIVORCED, SEPARATED, NEVER MARRIED, OR LIVING WITH A PARTNER?: WIDOWED

## 2025-05-03 SDOH — HEALTH STABILITY: MENTAL HEALTH
DO YOU FEEL STRESS - TENSE, RESTLESS, NERVOUS, OR ANXIOUS, OR UNABLE TO SLEEP AT NIGHT BECAUSE YOUR MIND IS TROUBLED ALL THE TIME - THESE DAYS?: NOT AT ALL

## 2025-05-03 SDOH — HEALTH STABILITY: PHYSICAL HEALTH: ON AVERAGE, HOW MANY DAYS PER WEEK DO YOU ENGAGE IN MODERATE TO STRENUOUS EXERCISE (LIKE A BRISK WALK)?: 0 DAYS

## 2025-05-03 SDOH — SOCIAL STABILITY: SOCIAL INSECURITY: DOES ANYONE TRY TO KEEP YOU FROM HAVING/CONTACTING OTHER FRIENDS OR DOING THINGS OUTSIDE YOUR HOME?: NO

## 2025-05-03 SDOH — SOCIAL STABILITY: SOCIAL NETWORK: IN A TYPICAL WEEK, HOW MANY TIMES DO YOU TALK ON THE PHONE WITH FAMILY, FRIENDS, OR NEIGHBORS?: ONCE A WEEK

## 2025-05-03 SDOH — SOCIAL STABILITY: SOCIAL INSECURITY: HAVE YOU HAD THOUGHTS OF HARMING ANYONE ELSE?: NO

## 2025-05-03 SDOH — SOCIAL STABILITY: SOCIAL INSECURITY: ARE THERE ANY APPARENT SIGNS OF INJURIES/BEHAVIORS THAT COULD BE RELATED TO ABUSE/NEGLECT?: NO

## 2025-05-03 SDOH — SOCIAL STABILITY: SOCIAL INSECURITY: DO YOU FEEL UNSAFE GOING BACK TO THE PLACE WHERE YOU ARE LIVING?: NO

## 2025-05-03 SDOH — SOCIAL STABILITY: SOCIAL INSECURITY: HAVE YOU HAD ANY THOUGHTS OF HARMING ANYONE ELSE?: NO

## 2025-05-03 SDOH — SOCIAL STABILITY: SOCIAL INSECURITY: HAS ANYONE EVER THREATENED TO HURT YOUR FAMILY OR YOUR PETS?: NO

## 2025-05-03 ASSESSMENT — LIFESTYLE VARIABLES
HOW OFTEN DO YOU HAVE A DRINK CONTAINING ALCOHOL: NEVER
HOW OFTEN DO YOU HAVE 6 OR MORE DRINKS ON ONE OCCASION: NEVER
AUDIT-C TOTAL SCORE: 0
HAVE PEOPLE ANNOYED YOU BY CRITICIZING YOUR DRINKING: NO
SKIP TO QUESTIONS 9-10: 1
TOTAL SCORE: 0
AUDIT-C TOTAL SCORE: 0
EVER FELT BAD OR GUILTY ABOUT YOUR DRINKING: NO
EVER HAD A DRINK FIRST THING IN THE MORNING TO STEADY YOUR NERVES TO GET RID OF A HANGOVER: NO
SUBSTANCE_ABUSE_PAST_12_MONTHS: NO
HOW MANY STANDARD DRINKS CONTAINING ALCOHOL DO YOU HAVE ON A TYPICAL DAY: PATIENT DOES NOT DRINK
PRESCIPTION_ABUSE_PAST_12_MONTHS: NO
HAVE YOU EVER FELT YOU SHOULD CUT DOWN ON YOUR DRINKING: NO

## 2025-05-03 ASSESSMENT — COGNITIVE AND FUNCTIONAL STATUS - GENERAL
TURNING FROM BACK TO SIDE WHILE IN FLAT BAD: A LITTLE
HELP NEEDED FOR BATHING: A LITTLE
DRESSING REGULAR UPPER BODY CLOTHING: A LITTLE
DRESSING REGULAR LOWER BODY CLOTHING: A LITTLE
PATIENT BASELINE BEDBOUND: NO
HELP NEEDED FOR BATHING: A LITTLE
TURNING FROM BACK TO SIDE WHILE IN FLAT BAD: A LITTLE
MOVING FROM LYING ON BACK TO SITTING ON SIDE OF FLAT BED WITH BEDRAILS: A LITTLE
MOVING FROM LYING ON BACK TO SITTING ON SIDE OF FLAT BED WITH BEDRAILS: A LITTLE
MOVING TO AND FROM BED TO CHAIR: A LITTLE
PERSONAL GROOMING: A LITTLE
CLIMB 3 TO 5 STEPS WITH RAILING: A LOT
MOBILITY SCORE: 18
TOILETING: A LITTLE
CLIMB 3 TO 5 STEPS WITH RAILING: A LOT
MOBILITY SCORE: 18
MOVING TO AND FROM BED TO CHAIR: A LITTLE
STANDING UP FROM CHAIR USING ARMS: A LITTLE
PERSONAL GROOMING: A LITTLE
TOILETING: A LITTLE
DAILY ACTIVITIY SCORE: 19
STANDING UP FROM CHAIR USING ARMS: A LITTLE
DRESSING REGULAR LOWER BODY CLOTHING: A LITTLE
DAILY ACTIVITIY SCORE: 19
DRESSING REGULAR UPPER BODY CLOTHING: A LITTLE

## 2025-05-03 ASSESSMENT — ACTIVITIES OF DAILY LIVING (ADL)
LACK_OF_TRANSPORTATION: NO
JUDGMENT_ADEQUATE_SAFELY_COMPLETE_DAILY_ACTIVITIES: YES
FEEDING YOURSELF: INDEPENDENT
BATHING: NEEDS ASSISTANCE
WALKS IN HOME: INDEPENDENT
GROOMING: NEEDS ASSISTANCE
PATIENT'S MEMORY ADEQUATE TO SAFELY COMPLETE DAILY ACTIVITIES?: YES
DRESSING YOURSELF: NEEDS ASSISTANCE
TOILETING: NEEDS ASSISTANCE
HEARING - LEFT EAR: DIFFICULTY WITH NOISE
ADEQUATE_TO_COMPLETE_ADL: YES
HEARING - RIGHT EAR: DIFFICULTY WITH NOISE

## 2025-05-03 ASSESSMENT — PATIENT HEALTH QUESTIONNAIRE - PHQ9
SUM OF ALL RESPONSES TO PHQ9 QUESTIONS 1 & 2: 0
1. LITTLE INTEREST OR PLEASURE IN DOING THINGS: NOT AT ALL
2. FEELING DOWN, DEPRESSED OR HOPELESS: NOT AT ALL

## 2025-05-03 ASSESSMENT — PAIN - FUNCTIONAL ASSESSMENT: PAIN_FUNCTIONAL_ASSESSMENT: 0-10

## 2025-05-03 ASSESSMENT — PAIN SCALES - GENERAL
PAINLEVEL_OUTOF10: 0 - NO PAIN
PAINLEVEL_OUTOF10: 0 - NO PAIN

## 2025-05-03 NOTE — H&P
History Of Present Illness  Saul Asif is a 89 y.o. male with past medical history significant for atrial fibrillation on Coumadin, CKD, HLD, hypothyroidism, prostate cancer, aortic valve stenosis, and type 2 diabetes mellitus who presents to the ED from facility for evaluation of low hemoglobin level.  Hemoglobin level 6.3 this morning on lab work at facility.  Patient reports extreme fatigue over the past 2 days.  Also notes dark stools however denies noticing any blood in his stools.  Denies any blood in his urine or coughing up any blood.  Has had blood transfusions in the past.  Reports some generalized weakness over the past couple days and generally not feeling very well.  Patient's family also notes shortness of breath and productive cough over the past week, granddaughter stating his breathing sounded horrible last week and when she was visiting him.  States the facility did a chest x-ray at that time which was unremarkable.  Patient still reports congestion, coughing up yellow phlegm and intermittent shortness of breath.  Denies any fevers or chills.  Denies headaches, dizziness, chest pains, abdominal pain or nausea, appetite changes, or urinary changes.  Says he had COVID at the beginning of the year which was very hard on him.    ED course: On arrival to the ED, patient afebrile and hemodynamically stable with SpO2 92% on room air.  Glucose 240, electrolytes WNL, BUN 55/creatinine 1.6, LFTs WNL, troponin 13.  PT greater than 100, unable to calculate INR level.  WBC 10, hemoglobin 6.3/hematocrit 20.8, platelets 241.  Chest x-ray shows new scattered bilateral lower lobe predominant nodular infiltrates could be infectious.    EKG (interpreted by ED physician): Sinus rhythm with prolonged OH interval, rate of 85, RBBB, no ST segment depression or elevation consistent with ischemia or infarction.    Admitting provider is Dr. Weaver      Past Medical History  Medical History[1]    Surgical History  Surgical  "History[2]     Social History  He reports that he has quit smoking. His smoking use included cigarettes. He has never used smokeless tobacco. No history on file for alcohol use and drug use.    Family History  Family History[3]     Allergies  Patient has no known allergies.    Review of Systems  10 point review system negative except as noted above in HPI    Physical Exam  Constitutional:       General: He is not in acute distress.     Appearance: Normal appearance. He is not toxic-appearing.   HENT:      Head: Normocephalic and atraumatic.      Mouth/Throat:      Pharynx: Oropharynx is clear.   Eyes:      Conjunctiva/sclera: Conjunctivae normal.   Cardiovascular:      Rate and Rhythm: Normal rate and regular rhythm.   Pulmonary:      Effort: Pulmonary effort is normal.      Breath sounds: No wheezing.      Comments: Coarse breath sounds bilaterally.  Currently on room air, SpO2 93%.  Tachypneic.  Abdominal:      General: Bowel sounds are normal. There is no distension.      Palpations: Abdomen is soft.      Tenderness: There is no abdominal tenderness.   Musculoskeletal:         General: Normal range of motion.      Right lower leg: No edema.      Left lower leg: No edema.   Skin:     General: Skin is warm and dry.   Neurological:      Mental Status: He is alert and oriented to person, place, and time.   Psychiatric:         Behavior: Behavior normal.       Last Recorded Vitals  Blood pressure 115/57, pulse 92, temperature 36.4 °C (97.5 °F), temperature source Temporal, resp. rate (!) 33, height 1.753 m (5' 9\"), weight 81.6 kg (180 lb), SpO2 96%.    Relevant Results  Results for orders placed or performed during the hospital encounter of 05/03/25 (from the past 24 hours)   CBC and Auto Differential   Result Value Ref Range    WBC 10.0 4.4 - 11.3 x10*3/uL    nRBC 0.2 (H) 0.0 - 0.0 /100 WBCs    RBC 2.03 (L) 4.50 - 5.90 x10*6/uL    Hemoglobin 6.3 (LL) 13.5 - 17.5 g/dL    Hematocrit 20.8 (L) 41.0 - 52.0 %     " (H) 80 - 100 fL    MCH 31.0 26.0 - 34.0 pg    MCHC 30.3 (L) 32.0 - 36.0 g/dL    RDW 15.3 (H) 11.5 - 14.5 %    Platelets 241 150 - 450 x10*3/uL    Neutrophils % 78.6 40.0 - 80.0 %    Immature Granulocytes %, Automated 0.7 0.0 - 0.9 %    Lymphocytes % 11.9 13.0 - 44.0 %    Monocytes % 6.2 2.0 - 10.0 %    Eosinophils % 2.2 0.0 - 6.0 %    Basophils % 0.4 0.0 - 2.0 %    Neutrophils Absolute 7.89 (H) 1.60 - 5.50 x10*3/uL    Immature Granulocytes Absolute, Automated 0.07 0.00 - 0.50 x10*3/uL    Lymphocytes Absolute 1.19 0.80 - 3.00 x10*3/uL    Monocytes Absolute 0.62 0.05 - 0.80 x10*3/uL    Eosinophils Absolute 0.22 0.00 - 0.40 x10*3/uL    Basophils Absolute 0.04 0.00 - 0.10 x10*3/uL   Comprehensive metabolic panel   Result Value Ref Range    Glucose 240 (H) 74 - 99 mg/dL    Sodium 139 136 - 145 mmol/L    Potassium 4.3 3.5 - 5.3 mmol/L    Chloride 103 98 - 107 mmol/L    Bicarbonate 28 21 - 32 mmol/L    Anion Gap 12 10 - 20 mmol/L    Urea Nitrogen 55 (H) 6 - 23 mg/dL    Creatinine 1.60 (H) 0.50 - 1.30 mg/dL    eGFR 40 (L) >60 mL/min/1.73m*2    Calcium 9.6 8.6 - 10.3 mg/dL    Albumin 3.6 3.4 - 5.0 g/dL    Alkaline Phosphatase 65 33 - 136 U/L    Total Protein 6.3 (L) 6.4 - 8.2 g/dL    AST 17 9 - 39 U/L    Bilirubin, Total 0.3 0.0 - 1.2 mg/dL    ALT 20 10 - 52 U/L   Protime-INR   Result Value Ref Range    Protime >100.0 (HH) 9.8 - 12.4 seconds    INR     Troponin I, High Sensitivity   Result Value Ref Range    Troponin I, High Sensitivity 13 0 - 20 ng/L   Type and screen   Result Value Ref Range    ABO TYPE AB     Rh TYPE POS     ANTIBODY SCREEN NEG    Verify ABO/Rh Group Test (VERAB)   Result Value Ref Range    ABO TYPE AB     Rh TYPE POS    Prepare RBC: 2 Units   Result Value Ref Range    PRODUCT CODE Q4991W61     Unit Number [id number]     Unit ABO A     Unit RH POS     XM INTEP COMP     Dispense Status XM     Blood Expiration Date 5/23/2025 11:59:00 PM EDT     PRODUCT BLOOD TYPE 6200     UNIT VOLUME 350     PRODUCT  CODE Z8437D57     Unit Number [id number]     Unit ABO A     Unit RH POS     XM INTEP COMP     Dispense Status XM     Blood Expiration Date 5/23/2025 11:59:00 PM EDT     PRODUCT BLOOD TYPE 6200     UNIT VOLUME 350       XR chest 1 view  Result Date: 5/3/2025  Interpreted By:  Cleve Cardenas, STUDY: XR CHEST 1 VIEW; 5/3/2025 2:02 pm   INDICATION: Signs/Symptoms:anemia.   COMPARISON: Chest x-ray 12/07/2024   ACCESSION NUMBER(S): [id number]   ORDERING CLINICIAN: SUMAN HENRIQUEZ   TECHNIQUE: 1 view of the chest was performed.   FINDINGS: Indeterminate bilateral lower lobe nodular infiltrates could be infectious. No pleural effusion. No pneumothorax.  The cardiomediastinal silhouette is within normal limits. Bilateral shoulder joint degenerative changes.       1. New scattered bilateral lower lobe predominant nodular infiltrates could be infectious. Finding could be further assessed by dedicated chest CT scan.   Signed by: Cleve Cardenas 5/3/2025 2:11 PM Dictation workstation:   [id number]        Assessment & Plan  Anemia, unspecified type    Supratherapeutic INR    Pneumonia    Productive cough    Shortness of breath    Generalized weakness      Saul Asif is a 89 y.o. male presents to the ED from facility for evaluation of low hemoglobin level.  Hemoglobin level 6.3 this morning on lab work at facility.  Patient reports extreme fatigue over the past 2 days.  Also notes dark stools however denies noticing any blood in his stools.  Denies any blood in his urine or coughing up any blood.  Has had blood transfusions in the past.  Reports some generalized weakness over the past couple days and generally not feeling very well.  Patient's family also notes shortness of breath and productive cough over the past week, granddaughter stating his breathing sounded horrible last week and when she was visiting him.  States the facility did a chest x-ray at that time which was unremarkable.     CODE STATUS: DNR, DNI    #Acute on  chronic anemia  #Supratherapeutic INR  #Atrial fibrillation on Coumadin  #Generalized weakness/fatigue  Admit for observation with telemetry monitoring  -Currently taking 2.5 mg Coumadin daily for history of atrial fibrillation.  Patient does report dark stools.  PT level greater than 100, unable to calculate INR level.  2 units RBCs ordered in the ED  Repeat H&H ordered after transfusion, continue to trend, transfuse as needed for hemoglobin less than 7  5 mg IV vitamin K ordered  Stool occult blood ordered  40 mg IV Protonix daily  Monitor daily PT/INR level  Q 4 vitals  CBC, BMP in the a.m.  Cardiac/diabetic/renal diet  Fall precautions, PT/OT for evaluation and treatment  Social work consult for discharge planning    #Suspect pneumonia  #Productive cough  #Intermittent shortness of breath, worse over the past week  Chest x-ray shows new scattered bilateral lower lobe predominant nodular infiltrates  Patient afebrile, no leukocytosis.  Urine pneumonia antigens ordered, procalcitonin ordered  IV Rocephin and IV azithromycin ordered  Cough medication, breathing treatments, oxygen as needed  COVID, flu, RSV ordered    #Diabetes mellitus  Hold home oral antidiabetic medications  Sliding scale insulin  ACHS Accu-Cheks  Tresiba 8 units daily    Continue home medications as appropriate    Chronic conditions:  A-fib, CKD, HLD, hypothyroidism, prostate cancer, aortic valve stenosis, type II DM    #DVT prophylaxis  Hold home oral anticoagulation  SCDs, ambulation as tolerated    I spent 60 minutes in the professional and overall care of this patient.    Apolinar Osborne PA-C         [1]   Past Medical History:  Diagnosis Date    Diabetes mellitus (Multi)    [2] No past surgical history on file.  [3]   Family History  Problem Relation Name Age of Onset    No Known Problems Mother      No Known Problems Father

## 2025-05-03 NOTE — ED PROVIDER NOTES
Limitations to History: None     HPI:      Saul Asif is a 89 y.o. male with significant PMH for A-fib on Coumadin, HLD, hypothyroidism, prostate CA, aortic valve stenosis and T2DM who is a DNR [facility] presenting to ED today from [facility] for evaluation of low hemoglobin.  Patient reports extreme fatigue over the last day and a half.  Has had dark stools but denies bloody stools.  Currently on Coumadin.  Has had transfusions in the past.  Denies fever/chills, cough/cold symptoms, chest pain, shortness of breath, nausea/vomiting, abdominal pain, urinary symptoms or any other complaints.  No smoking, EtOH or IV drugs.  PCP is Dr. Weaver.     Additional History Obtained from: Triage/nursing notes and EMS report reviewed.  SNF paperwork reviewed.    ------------------------------------------------------------------------------------------------------------------------------------------    VS: As documented in the triage note and EMR flowsheet from this visit were reviewed.    Physical Exam:  Gen: 89-year-old male, awake and alert, oriented to name and place.  Thin with some muscle wasting, well-hydrated.  Appears tired and fatigued but nontoxic looking.  Head/Neck: NCAT, neck w/ FROM  Eyes: EOMI, PERRL, anicteric sclerae, conjunctiva not injected but pale   Ears: TMs clear b/l without sign of infection  Nose: Nares patent w/o rhinorrhea  Mouth:  MMM, no OP lesions noted  Heart: RRR no MRG  Lungs: CTA b/l no RRW, no increased work of breathing  Abdomen: soft, NT, ND, no HSM, no palpable masses  Musculoskeletal: EDGAR x 4.  MSPs intact.  Skin intact.  No deformities  Neurologic: Alert, symmetrical facies, phonates clearly, moves all extremities equally, responsive to touch, ambulates normally   Skin: Pale pink, warm and dry.  No erythema, edema or ecchymosis.  No rashes noted  Psychological: calm, no  SI/HI      ------------------------------------------------------------------------------------------------------------------------------------------    Medical Decision Makin y.o. male with significant PMH for A-fib on Coumadin, HLD, hypothyroidism, prostate CA, aortic valve stenosis and T2DM is evaluated at the bedside for low hemoglobin, weakness/fatigue x 2 days and some intermittent dark stools.  On arrival vital signs within normal limits.  Afebrile.  Lungs clear, abdomen soft nontender.  Conjunctiva pale.    Differential includes is not limited to coagulopathy, anemia and GI bleed.    IV established, continuous cardiac/O2 sat monitoring.  Will recheck basic labs with type and screen/VERAB, EKG and chest x-ray.  Anticipate admission.      ED Course as of 25 1519   Sat May 03, 2025   1426 Labs resulted so far include a hemoglobin of 6.3, no leukocytosis.  Will require transfusion/admission, consented for 2 units PRC's.  I spoke with Dr. Weaver, he agrees with admission to telemetry.  House NP aware. [SB]   1446 Pro time greater than 100, unable to calculate INR.  Glucose 240.  BUN 55, creatinine 1.6 with GFR 40 as a baseline for the patient.  High-sensitivity troponin normal.  Chest x-ray shows new bilateral nodular infiltrates.  No cough or fever, low suspicion for pneumonia.  show with a pro time of 100, will likely need reversal of warfarin.  As the patient is not currently actively bleeding, this will be handed by the admitting team. [SB]      ED Course User Index  [SB] Maria E Quan, APRN-CNP         Diagnoses as of 25 1519   Anemia, unspecified type   On warfarin for atrial fibrillation (Multi)   Dark stools       EKG interpreted by Dr. Madden and 1406, sinus rhythm with a prolonged SD interval, rate of 85, right bundle branch block, no ST segment depression or elevation consistent with ischemia or infarction.    Chronic Medical Conditions Significantly Affecting Care: None    External  Records Reviewed: I reviewed recent and relevant outside records including: None    Discussion of Management with Other Providers: Seen and evaluated with ED attending physician, Dr. Madden, he agrees with the treatment plan and final disposition of the patient.    I discussed the patient/results with:  Dr. Weaver/Gregg Quan, APRN-CNP  05/03/25 1801

## 2025-05-03 NOTE — ED TRIAGE NOTES
Pt BIBA with c/o generalized weakness and fatigue x2 days. Pt had labs drawn at [facility] which revealed a Hgb of 6.3, pt endorses dark stools. Alert and [employer] [place]. Pt is also on coumadin. Denies CP or SOB.

## 2025-05-03 NOTE — PROGRESS NOTES
Pharmacy Medication History Review    Saul Asif is a 89 y.o. male admitted for Anemia, unspecified type. Pharmacy reviewed the patient's [id number] medications and allergies for accuracy.    The list below reflectives the updated PTA list. Please review each medication in order reconciliation for additional clarification and justification.  Prior to Admission medications    Medication Sig Start Date End Date Authorizing Provider   levothyroxine (Synthroid, Levoxyl) 100 mcg tablet TAKE 1 TABLET BY MOUTH ONCE  DAILY   Emiliano Avina MD   torsemide (Demadex) 20 mg tablet Take 1 tablet (20 mg) by mouth once daily.   Historical Provider, MD   albuterol 90 mcg/actuation inhaler Inhale 2 puffs every 6 hours if needed for wheezing or shortness of breath.   Gala Campo DO   alum-mag hydroxide-simeth (Coleen-Lanta) 200-200-20 mg/5 mL oral suspension Take 30 mL by mouth every 6 hours if needed for indigestion or heartburn.   Historical Provider, MD   atorvastatin (Lipitor) 10 mg tablet Take 1 tablet (10 mg) by mouth once daily at bedtime.   Matthew Tubbs MD   brimonidine (AlphaGAN) 0.2 % ophthalmic solution Administer 1 drop into both eyes 2 times a day.   Historical Provider, MD   calcium carbonate-vitamin D3 (Calcium 500 + D) 500 mg-10 mcg (400 unit) tablet Take 1 tablet by mouth 3 times a day.   Historical Provider, MD   cholecalciferol (Vitamin D-3) 25 mcg (1,000 units) tablet Take 1 tablet (25 mcg) by mouth once daily.   Historical Provider, MD   diphenhydrAMINE-acetaminophen (Tylenol PM Extra Strength)  mg per tablet Take 1 tablet by mouth once daily at bedtime.   Historical Provider, MD   docusate sodium (Colace) 100 mg capsule Take 1 capsule (100 mg) by mouth once daily at bedtime.   Historical Provider, MD   ferrous sulfate 325 (65 Fe) MG tablet Take 1 tablet (325 mg) by mouth once daily at bedtime.   Historical Provider, MD   glipiZIDE (Glucotrol) 5 mg tablet Take 1 tablet (5 mg) by mouth  once daily.   Historical Provider, MD   insulin aspart (NovoLOG U-100 Insulin aspart) 100 unit/mL injection Inject 0-10 Units under the skin 4 times a day before meals. Per sliding scale: 150-200 = 2 units, 201-250 = 4 units, 251-300 = 6 units, 301-350 = 8 units, 351-400 = 10 units, >400 = notify MD   Historical Provider, MD   insulin degludec (Tresiba FlexTouch U-100) 100 unit/mL (3 mL) injection Inject 8 Units under the skin once daily. In the afternoon   Emiliano Avina MD   levothyroxine (Synthroid, Levoxyl) 100 mcg tablet Take 1 tablet (100 mcg) by mouth early in the morning..   Emiliano Avina MD   melatonin 5 mg tablet Take 1 tablet (5 mg) by mouth as needed at bedtime for sleep.   Historical Provider, MD   oxymetazoline (Afrin, oxymetazoline,) 0.05 % nasal spray Administer 2 sprays into each nostril every 4 hours if needed (nose bleed).   Historical Provider, MD   traZODone (Desyrel) 50 mg tablet Take 0.5 tablets (25 mg) by mouth once daily at bedtime.   Historical Provider, MD   warfarin (Coumadin) 2.5 mg tablet Take 1 tablet (2.5 mg) by mouth once daily.   Matthew Tubbs MD        The list below reflectives the updated allergy list. Please review each documented allergy for additional clarification and justification.  Allergies  Reviewed by KISHA Gibson-CNP on 5/3/2025   No Known Allergies         Below are additional concerns with the patient's PTA list.      Sona Carlos

## 2025-05-04 VITALS
BODY MASS INDEX: 26.66 KG/M2 | DIASTOLIC BLOOD PRESSURE: 59 MMHG | SYSTOLIC BLOOD PRESSURE: 123 MMHG | HEIGHT: 69 IN | HEART RATE: 76 BPM | OXYGEN SATURATION: 95 % | WEIGHT: 180 LBS | RESPIRATION RATE: 16 BRPM | TEMPERATURE: 96.8 F

## 2025-05-04 LAB
ANION GAP SERPL CALC-SCNC: 13 MMOL/L (ref 10–20)
BLOOD EXPIRATION DATE: NORMAL
BLOOD EXPIRATION DATE: NORMAL
BUN SERPL-MCNC: 56 MG/DL (ref 6–23)
CALCIUM SERPL-MCNC: 9.1 MG/DL (ref 8.6–10.3)
CHLORIDE SERPL-SCNC: 99 MMOL/L (ref 98–107)
CO2 SERPL-SCNC: 29 MMOL/L (ref 21–32)
CREAT SERPL-MCNC: 1.67 MG/DL (ref 0.5–1.3)
DISPENSE STATUS: NORMAL
DISPENSE STATUS: NORMAL
EGFRCR SERPLBLD CKD-EPI 2021: 38 ML/MIN/1.73M*2
ERYTHROCYTE [DISTWIDTH] IN BLOOD BY AUTOMATED COUNT: 16.9 % (ref 11.5–14.5)
GLUCOSE BLD MANUAL STRIP-MCNC: 193 MG/DL (ref 74–99)
GLUCOSE BLD MANUAL STRIP-MCNC: 204 MG/DL (ref 74–99)
GLUCOSE BLD MANUAL STRIP-MCNC: 227 MG/DL (ref 74–99)
GLUCOSE BLD MANUAL STRIP-MCNC: 227 MG/DL (ref 74–99)
GLUCOSE SERPL-MCNC: 243 MG/DL (ref 74–99)
HCT VFR BLD AUTO: 28 % (ref 41–52)
HCT VFR BLD AUTO: 28 % (ref 41–52)
HGB BLD-MCNC: 8.8 G/DL (ref 13.5–17.5)
HGB BLD-MCNC: 8.8 G/DL (ref 13.5–17.5)
INR PPP: 1.5 (ref 0.9–1.1)
IRON SATN MFR SERPL: 63 % (ref 25–45)
IRON SERPL-MCNC: 175 UG/DL (ref 35–150)
LEGIONELLA AG UR QL: NEGATIVE
MCH RBC QN AUTO: 30.4 PG (ref 26–34)
MCHC RBC AUTO-ENTMCNC: 31.4 G/DL (ref 32–36)
MCV RBC AUTO: 97 FL (ref 80–100)
NRBC BLD-RTO: 0.4 /100 WBCS (ref 0–0)
PLATELET # BLD AUTO: 211 X10*3/UL (ref 150–450)
POTASSIUM SERPL-SCNC: 3.9 MMOL/L (ref 3.5–5.3)
PROCALCITONIN SERPL-MCNC: 0.07 NG/ML
PRODUCT BLOOD TYPE: 6200
PRODUCT BLOOD TYPE: 6200
PRODUCT CODE: NORMAL
PRODUCT CODE: NORMAL
PROTHROMBIN TIME: 16.5 SECONDS (ref 9.8–12.4)
RBC # BLD AUTO: 2.89 X10*6/UL (ref 4.5–5.9)
S PNEUM AG UR QL: NEGATIVE
SODIUM SERPL-SCNC: 137 MMOL/L (ref 136–145)
TIBC SERPL-MCNC: 280 UG/DL (ref 240–445)
UIBC SERPL-MCNC: 105 UG/DL (ref 110–370)
UNIT ABO: NORMAL
UNIT ABO: NORMAL
UNIT NUMBER: NORMAL
UNIT NUMBER: NORMAL
UNIT RH: NORMAL
UNIT RH: NORMAL
UNIT VOLUME: 350
UNIT VOLUME: 350
WBC # BLD AUTO: 8.2 X10*3/UL (ref 4.4–11.3)
XM INTEP: NORMAL
XM INTEP: NORMAL

## 2025-05-04 PROCEDURE — 36430 TRANSFUSION BLD/BLD COMPNT: CPT

## 2025-05-04 PROCEDURE — 97165 OT EVAL LOW COMPLEX 30 MIN: CPT | Mod: GO

## 2025-05-04 PROCEDURE — 97161 PT EVAL LOW COMPLEX 20 MIN: CPT | Mod: GP

## 2025-05-04 PROCEDURE — 2500000004 HC RX 250 GENERAL PHARMACY W/ HCPCS (ALT 636 FOR OP/ED): Mod: JZ | Performed by: PHYSICIAN ASSISTANT

## 2025-05-04 PROCEDURE — 94640 AIRWAY INHALATION TREATMENT: CPT

## 2025-05-04 PROCEDURE — 2500000002 HC RX 250 W HCPCS SELF ADMINISTERED DRUGS (ALT 637 FOR MEDICARE OP, ALT 636 FOR OP/ED)

## 2025-05-04 PROCEDURE — 2500000004 HC RX 250 GENERAL PHARMACY W/ HCPCS (ALT 636 FOR OP/ED): Performed by: INTERNAL MEDICINE

## 2025-05-04 PROCEDURE — 82947 ASSAY GLUCOSE BLOOD QUANT: CPT

## 2025-05-04 PROCEDURE — 80048 BASIC METABOLIC PNL TOTAL CA: CPT | Performed by: PHYSICIAN ASSISTANT

## 2025-05-04 PROCEDURE — 2500000001 HC RX 250 WO HCPCS SELF ADMINISTERED DRUGS (ALT 637 FOR MEDICARE OP): Performed by: PHYSICIAN ASSISTANT

## 2025-05-04 PROCEDURE — 2500000002 HC RX 250 W HCPCS SELF ADMINISTERED DRUGS (ALT 637 FOR MEDICARE OP, ALT 636 FOR OP/ED): Performed by: PHYSICIAN ASSISTANT

## 2025-05-04 PROCEDURE — 2500000002 HC RX 250 W HCPCS SELF ADMINISTERED DRUGS (ALT 637 FOR MEDICARE OP, ALT 636 FOR OP/ED): Performed by: INTERNAL MEDICINE

## 2025-05-04 PROCEDURE — 85027 COMPLETE CBC AUTOMATED: CPT | Performed by: PHYSICIAN ASSISTANT

## 2025-05-04 PROCEDURE — P9016 RBC LEUKOCYTES REDUCED: HCPCS

## 2025-05-04 PROCEDURE — 83540 ASSAY OF IRON: CPT | Performed by: INTERNAL MEDICINE

## 2025-05-04 PROCEDURE — 83550 IRON BINDING TEST: CPT | Performed by: INTERNAL MEDICINE

## 2025-05-04 PROCEDURE — 1200000002 HC GENERAL ROOM WITH TELEMETRY DAILY

## 2025-05-04 PROCEDURE — 36415 COLL VENOUS BLD VENIPUNCTURE: CPT | Performed by: PHYSICIAN ASSISTANT

## 2025-05-04 PROCEDURE — 85610 PROTHROMBIN TIME: CPT | Performed by: PHYSICIAN ASSISTANT

## 2025-05-04 RX ORDER — GUAIFENESIN 600 MG/1
600 TABLET, EXTENDED RELEASE ORAL 2 TIMES DAILY
Status: DISPENSED | OUTPATIENT
Start: 2025-05-04

## 2025-05-04 RX ORDER — IPRATROPIUM BROMIDE 0.5 MG/2.5ML
SOLUTION RESPIRATORY (INHALATION)
Status: COMPLETED
Start: 2025-05-04 | End: 2025-05-04

## 2025-05-04 RX ORDER — IPRATROPIUM BROMIDE 0.5 MG/2.5ML
0.5 SOLUTION RESPIRATORY (INHALATION)
Status: DISCONTINUED | OUTPATIENT
Start: 2025-05-04 | End: 2025-05-04

## 2025-05-04 RX ORDER — IPRATROPIUM BROMIDE 0.5 MG/2.5ML
0.5 SOLUTION RESPIRATORY (INHALATION)
Status: DISPENSED | OUTPATIENT
Start: 2025-05-04

## 2025-05-04 RX ADMIN — BRIMONIDINE TARTRATE 1 DROP: 2 SOLUTION/ DROPS OPHTHALMIC at 20:42

## 2025-05-04 RX ADMIN — GUAIFENESIN 600 MG: 600 TABLET, EXTENDED RELEASE ORAL at 20:42

## 2025-05-04 RX ADMIN — INSULIN LISPRO 4 UNITS: 100 INJECTION, SOLUTION INTRAVENOUS; SUBCUTANEOUS at 12:32

## 2025-05-04 RX ADMIN — INSULIN GLARGINE 10 UNITS: 100 INJECTION, SOLUTION SUBCUTANEOUS at 20:41

## 2025-05-04 RX ADMIN — IPRATROPIUM BROMIDE 0.5 MG: 0.5 SOLUTION RESPIRATORY (INHALATION) at 18:56

## 2025-05-04 RX ADMIN — LEVOTHYROXINE SODIUM 100 MCG: 0.1 TABLET ORAL at 05:53

## 2025-05-04 RX ADMIN — IPRATROPIUM BROMIDE 0.5 MG: 0.5 SOLUTION RESPIRATORY (INHALATION) at 11:55

## 2025-05-04 RX ADMIN — DOCUSATE SODIUM 100 MG: 100 CAPSULE, LIQUID FILLED ORAL at 20:42

## 2025-05-04 RX ADMIN — TORSEMIDE 20 MG: 20 TABLET ORAL at 08:26

## 2025-05-04 RX ADMIN — CEFTRIAXONE SODIUM 2 G: 2 INJECTION, SOLUTION INTRAVENOUS at 20:42

## 2025-05-04 RX ADMIN — Medication 25 MCG: at 08:26

## 2025-05-04 RX ADMIN — PANTOPRAZOLE SODIUM 40 MG: 40 INJECTION, POWDER, FOR SOLUTION INTRAVENOUS at 08:26

## 2025-05-04 RX ADMIN — GUAIFENESIN 600 MG: 600 TABLET, EXTENDED RELEASE ORAL at 12:43

## 2025-05-04 RX ADMIN — BRIMONIDINE TARTRATE 1 DROP: 2 SOLUTION/ DROPS OPHTHALMIC at 09:04

## 2025-05-04 RX ADMIN — AZITHROMYCIN DIHYDRATE 500 MG: 500 INJECTION, POWDER, LYOPHILIZED, FOR SOLUTION INTRAVENOUS at 18:02

## 2025-05-04 RX ADMIN — INSULIN LISPRO 4 UNITS: 100 INJECTION, SOLUTION INTRAVENOUS; SUBCUTANEOUS at 18:03

## 2025-05-04 RX ADMIN — TRAZODONE HYDROCHLORIDE 25 MG: 50 TABLET ORAL at 20:42

## 2025-05-04 RX ADMIN — ATORVASTATIN CALCIUM 10 MG: 10 TABLET, FILM COATED ORAL at 20:41

## 2025-05-04 RX ADMIN — INSULIN LISPRO 4 UNITS: 100 INJECTION, SOLUTION INTRAVENOUS; SUBCUTANEOUS at 07:54

## 2025-05-04 RX ADMIN — FERROUS SULFATE TAB 325 MG (65 MG ELEMENTAL FE) 325 MG: 325 (65 FE) TAB at 20:42

## 2025-05-04 ASSESSMENT — COGNITIVE AND FUNCTIONAL STATUS - GENERAL
HELP NEEDED FOR BATHING: A LITTLE
DRESSING REGULAR UPPER BODY CLOTHING: A LITTLE
STANDING UP FROM CHAIR USING ARMS: A LITTLE
DRESSING REGULAR LOWER BODY CLOTHING: A LITTLE
DRESSING REGULAR LOWER BODY CLOTHING: A LITTLE
MOVING TO AND FROM BED TO CHAIR: A LITTLE
MOBILITY SCORE: 17
MOBILITY SCORE: 18
WALKING IN HOSPITAL ROOM: A LITTLE
MOVING FROM LYING ON BACK TO SITTING ON SIDE OF FLAT BED WITH BEDRAILS: A LITTLE
DAILY ACTIVITIY SCORE: 19
HELP NEEDED FOR BATHING: A LITTLE
TURNING FROM BACK TO SIDE WHILE IN FLAT BAD: A LITTLE
DRESSING REGULAR UPPER BODY CLOTHING: A LITTLE
TOILETING: A LITTLE
PERSONAL GROOMING: A LITTLE
DAILY ACTIVITIY SCORE: 19
PERSONAL GROOMING: A LITTLE
STANDING UP FROM CHAIR USING ARMS: A LITTLE
CLIMB 3 TO 5 STEPS WITH RAILING: A LOT
MOVING FROM LYING ON BACK TO SITTING ON SIDE OF FLAT BED WITH BEDRAILS: A LITTLE
CLIMB 3 TO 5 STEPS WITH RAILING: A LOT
TOILETING: A LITTLE
TURNING FROM BACK TO SIDE WHILE IN FLAT BAD: A LITTLE
MOVING TO AND FROM BED TO CHAIR: A LITTLE

## 2025-05-04 ASSESSMENT — PAIN - FUNCTIONAL ASSESSMENT
PAIN_FUNCTIONAL_ASSESSMENT: 0-10
PAIN_FUNCTIONAL_ASSESSMENT: 0-10

## 2025-05-04 ASSESSMENT — PAIN SCALES - GENERAL
PAINLEVEL_OUTOF10: 0 - NO PAIN

## 2025-05-04 NOTE — PROGRESS NOTES
Saul Asif is a 89 y.o. male on day 0 of admission presenting with Anemia, unspecified type.      Subjective   Patient fully evaluated  05/04  for    Problem List Items Addressed This Visit       Pneumonia    * (Principal) Anemia, unspecified type - Primary     Other Visit Diagnoses         On warfarin for atrial fibrillation (Multi)          Dark stools          Elevated protime              Patient seen resting in bed with head of bed elevated, no s/s or c/o acute difficulties at this time.  Vital signs for last 24 hours Temp:  [35.5 °C (95.9 °F)-36.5 °C (97.7 °F)] 36 °C (96.8 °F)  Heart Rate:  [71-98] 72  Resp:  [16-33] 16  BP: (108-147)/(6-85) 108/56    No intake/output data recorded.  Patient still requiring frequent cardiac and SPO2 monitoring. Continue aggressive pulmonary hygiene and oral hygiene. Off loading as tolerated for skin integrity. Medications and labs reviewed-   Results for orders placed or performed during the hospital encounter of 05/03/25 (from the past 24 hours)   CBC and Auto Differential   Result Value Ref Range    WBC 10.0 4.4 - 11.3 x10*3/uL    nRBC 0.2 (H) 0.0 - 0.0 /100 WBCs    RBC 2.03 (L) 4.50 - 5.90 x10*6/uL    Hemoglobin 6.3 (LL) 13.5 - 17.5 g/dL    Hematocrit 20.8 (L) 41.0 - 52.0 %     (H) 80 - 100 fL    MCH 31.0 26.0 - 34.0 pg    MCHC 30.3 (L) 32.0 - 36.0 g/dL    RDW 15.3 (H) 11.5 - 14.5 %    Platelets 241 150 - 450 x10*3/uL    Neutrophils % 78.6 40.0 - 80.0 %    Immature Granulocytes %, Automated 0.7 0.0 - 0.9 %    Lymphocytes % 11.9 13.0 - 44.0 %    Monocytes % 6.2 2.0 - 10.0 %    Eosinophils % 2.2 0.0 - 6.0 %    Basophils % 0.4 0.0 - 2.0 %    Neutrophils Absolute 7.89 (H) 1.60 - 5.50 x10*3/uL    Immature Granulocytes Absolute, Automated 0.07 0.00 - 0.50 x10*3/uL    Lymphocytes Absolute 1.19 0.80 - 3.00 x10*3/uL    Monocytes Absolute 0.62 0.05 - 0.80 x10*3/uL    Eosinophils Absolute 0.22 0.00 - 0.40 x10*3/uL    Basophils Absolute 0.04 0.00 - 0.10 x10*3/uL    Comprehensive metabolic panel   Result Value Ref Range    Glucose 240 (H) 74 - 99 mg/dL    Sodium 139 136 - 145 mmol/L    Potassium 4.3 3.5 - 5.3 mmol/L    Chloride 103 98 - 107 mmol/L    Bicarbonate 28 21 - 32 mmol/L    Anion Gap 12 10 - 20 mmol/L    Urea Nitrogen 55 (H) 6 - 23 mg/dL    Creatinine 1.60 (H) 0.50 - 1.30 mg/dL    eGFR 40 (L) >60 mL/min/1.73m*2    Calcium 9.6 8.6 - 10.3 mg/dL    Albumin 3.6 3.4 - 5.0 g/dL    Alkaline Phosphatase 65 33 - 136 U/L    Total Protein 6.3 (L) 6.4 - 8.2 g/dL    AST 17 9 - 39 U/L    Bilirubin, Total 0.3 0.0 - 1.2 mg/dL    ALT 20 10 - 52 U/L   Protime-INR   Result Value Ref Range    Protime >100.0 (HH) 9.8 - 12.4 seconds    INR     Troponin I, High Sensitivity   Result Value Ref Range    Troponin I, High Sensitivity 13 0 - 20 ng/L   Type and screen   Result Value Ref Range    ABO TYPE AB     Rh TYPE POS     ANTIBODY SCREEN NEG    Magnesium   Result Value Ref Range    Magnesium 1.93 1.60 - 2.40 mg/dL   Verify ABO/Rh Group Test (VERAB)   Result Value Ref Range    ABO TYPE AB     Rh TYPE POS    Prepare RBC: 2 Units   Result Value Ref Range    PRODUCT CODE K5460E97     Unit Number [id number]     Unit ABO A     Unit RH POS     XM INTEP COMP     Dispense Status TR     Blood Expiration Date 5/23/2025 11:59:00 PM EDT     PRODUCT BLOOD TYPE 6200     UNIT VOLUME 350     PRODUCT CODE A6203F69     Unit Number [id number]     Unit ABO A     Unit RH POS     XM INTEP COMP     Dispense Status IS     Blood Expiration Date 5/23/2025 11:59:00 PM EDT     PRODUCT BLOOD TYPE 6200     UNIT VOLUME 350    Sars-CoV-2, Influenza A/B and RSV PCR   Result Value Ref Range    Coronavirus 2019, PCR Not Detected Not Detected    Flu A Result Not Detected Not Detected    Flu B Result Not Detected Not Detected    RSV PCR Not Detected Not Detected   Legionella Antigen, Urine    Specimen: Clean Catch/Voided; Urine   Result Value Ref Range    L. pneumophila Urine Ag Negative Negative   Streptococcus  pneumoniae Antigen, Urine    Specimen: Clean Catch/Voided; Urine   Result Value Ref Range    Streptococcus pneumoniae Ag, Urine Negative Negative   POCT GLUCOSE   Result Value Ref Range    POCT Glucose 279 (H) 74 - 99 mg/dL   POCT GLUCOSE   Result Value Ref Range    POCT Glucose 227 (H) 74 - 99 mg/dL   Hemoglobin and hematocrit, blood   Result Value Ref Range    Hemoglobin 8.8 (L) 13.5 - 17.5 g/dL    Hematocrit 28.0 (L) 41.0 - 52.0 %   CBC   Result Value Ref Range    WBC 8.2 4.4 - 11.3 x10*3/uL    nRBC 0.4 (H) 0.0 - 0.0 /100 WBCs    RBC 2.89 (L) 4.50 - 5.90 x10*6/uL    Hemoglobin 8.8 (L) 13.5 - 17.5 g/dL    Hematocrit 28.0 (L) 41.0 - 52.0 %    MCV 97 80 - 100 fL    MCH 30.4 26.0 - 34.0 pg    MCHC 31.4 (L) 32.0 - 36.0 g/dL    RDW 16.9 (H) 11.5 - 14.5 %    Platelets 211 150 - 450 x10*3/uL   Basic metabolic panel   Result Value Ref Range    Glucose 243 (H) 74 - 99 mg/dL    Sodium 137 136 - 145 mmol/L    Potassium 3.9 3.5 - 5.3 mmol/L    Chloride 99 98 - 107 mmol/L    Bicarbonate 29 21 - 32 mmol/L    Anion Gap 13 10 - 20 mmol/L    Urea Nitrogen 56 (H) 6 - 23 mg/dL    Creatinine 1.67 (H) 0.50 - 1.30 mg/dL    eGFR 38 (L) >60 mL/min/1.73m*2    Calcium 9.1 8.6 - 10.3 mg/dL   Protime-INR   Result Value Ref Range    Protime 16.5 (H) 9.8 - 12.4 seconds    INR 1.5 (H) 0.9 - 1.1   POCT GLUCOSE   Result Value Ref Range    POCT Glucose 227 (H) 74 - 99 mg/dL      Patient recently received an antibiotic (last 12 hours)       None           Plan discussed with interdisciplinary team, occult stool in process, will continue to monitor for bleeding. Pulmonology on the case -will continue IV antibiotics, possible DDAVP,  Coumadin was reversed with vitamin K, transfuse 1 unit of blood, follow-up H&H posttransfusion, will continue current and repeat labs in the AM.     Discharge planning discussed with patient and care team. Therapy evaluations ordered. Anticipate HHC/SNF at discharge. Patient aware and agreeable to current plan, continue  plan as above.     I spent a total of 60 minutes on the date of the service which included preparing to see the patient, face-to-face patient care, completing clinical documentation, obtaining and/or reviewing separately obtained history, performing a medically appropriate examination, counseling and educating the patient/family/caregiver, ordering medications, tests, or procedures, communicating with other HCPs (not separately reported), independently interpreting results (not separately reported), communicating results to the patient/family/caregiver, and care coordination (not separately reported).        Objective     Last Recorded Vitals  /56   Pulse 72   Temp 36 °C (96.8 °F)   Resp 16   Wt 81.6 kg (180 lb)   SpO2 95%   Intake/Output last 3 Shifts:    Intake/Output Summary (Last 24 hours) at 5/4/2025 1343  Last data filed at 5/4/2025 0345  Gross per 24 hour   Intake 982.17 ml   Output 550 ml   Net 432.17 ml       Admission Weight  Weight: 81.6 kg (180 lb) (05/03/25 1341)    Daily Weight  05/03/25 : 81.6 kg (180 lb)    Image Results  XR chest 1 view  Narrative: Interpreted By:  Cleve Cardenas,   STUDY:  XR CHEST 1 VIEW; 5/3/2025 2:02 pm      INDICATION:  Signs/Symptoms:anemia.      COMPARISON:  Chest x-ray 12/07/2024      ACCESSION NUMBER(S):  [id number]      ORDERING CLINICIAN:  SUMAN HENRIQUEZ      TECHNIQUE:  1 view of the chest was performed.      FINDINGS:  Indeterminate bilateral lower lobe nodular infiltrates could be  infectious. No pleural effusion. No pneumothorax.  The  cardiomediastinal silhouette is within normal limits. Bilateral  shoulder joint degenerative changes.      Impression: 1. New scattered bilateral lower lobe predominant nodular infiltrates  could be infectious. Finding could be further assessed by dedicated  chest CT scan.      Signed by: Cleve Cardenas 5/3/2025 2:11 PM  Dictation workstation:   [id number]      Physical Exam    Relevant Results                               Assessment & Plan  Anemia, unspecified type    Supratherapeutic INR    Pneumonia    Productive cough    Shortness of breath    Generalized weakness       Ngozi Lea  Coordinator  Internal Medicine     H&P      Incomplete     Date of Service: 5/3/2025  5:40 PM     Incomplete       Expand All Collapse All    History Of Present Illness  Saul Asif is a 89 y.o. male with past medical history significant for atrial fibrillation on Coumadin, CKD, HLD, hypothyroidism, prostate cancer, aortic valve stenosis, and type 2 diabetes mellitus who presents to the ED from facility for evaluation of low hemoglobin level.  Hemoglobin level 6.3 this morning on lab work at facility.  Patient reports extreme fatigue over the past 2 days.  Also notes dark stools however denies noticing any blood in his stools.  Denies any blood in his urine or coughing up any blood.  Has had blood transfusions in the past.  Reports some generalized weakness over the past couple days and generally not feeling very well.  Patient's family also notes shortness of breath and productive cough over the past week, granddaughter stating his breathing sounded horrible last week and when she was visiting him.  States the facility did a chest x-ray at that time which was unremarkable.  Patient still reports congestion, coughing up yellow phlegm and intermittent shortness of breath.  Denies any fevers or chills.  Denies headaches, dizziness, chest pains, abdominal pain or nausea, appetite changes, or urinary changes.  Says he had COVID at the beginning of the year which was very hard on him.     ED course: On arrival to the ED, patient afebrile and hemodynamically stable with SpO2 92% on room air.  Glucose 240, electrolytes WNL, BUN 55/creatinine 1.6, LFTs WNL, troponin 13.  PT greater than 100, unable to calculate INR level.  WBC 10, hemoglobin 6.3/hematocrit 20.8, platelets 241.  Chest x-ray shows new scattered bilateral lower lobe predominant  nodular infiltrates could be infectious.     EKG (interpreted by ED physician): Sinus rhythm with prolonged NH interval, rate of 85, RBBB, no ST segment depression or elevation consistent with ischemia or infarction.           Past Medical History  [Medical History]    [Medical History]  Past Medical History       Diagnosis Date    Diabetes mellitus (Multi)          Surgical History  [Surgical History]    [Surgical History]  Past Surgical History  No past surgical history on file.     Social History  He reports that he has quit smoking. His smoking use included cigarettes. He has never used smokeless tobacco. No history on file for alcohol use and drug use.     Family History  [Family History]    [Family History]         Problem Relation Name Age of Onset    No Known Problems Mother        No Known Problems Father            Allergies  Patient has no known allergies.     Review of Systems   All other systems reviewed and are negative.     10 point review system negative except as noted above in HPI     Physical Exam  Vitals and nursing note reviewed.   Constitutional:       General: He is not in acute distress.     Appearance: Normal appearance. He is not toxic-appearing.   HENT:      Head: Normocephalic and atraumatic.      Mouth/Throat:      Pharynx: Oropharynx is clear.   Eyes:      Conjunctiva/sclera: Conjunctivae normal.   Cardiovascular:      Rate and Rhythm: Normal rate and regular rhythm.   Pulmonary:      Effort: Pulmonary effort is normal.      Breath sounds: No wheezing.      Comments: Coarse breath sounds bilaterally.  Currently on room air, SpO2 93%.  Tachypneic.  Abdominal:      General: Bowel sounds are normal. There is no distension.      Palpations: Abdomen is soft.      Tenderness: There is no abdominal tenderness.   Musculoskeletal:         General: Normal range of motion.      Right lower leg: No edema.      Left lower leg: No edema.   Skin:     General: Skin is warm and dry.   Neurological:       "Mental Status: He is alert and oriented to person, place, and time.   Psychiatric:         Behavior: Behavior normal.         Last Recorded Vitals  Blood pressure 108/56, pulse 72, temperature 36 °C (96.8 °F), resp. rate 16, height 1.753 m (5' 9.02\"), weight 81.6 kg (180 lb), SpO2 95%.     Relevant Results        Results for orders placed or performed during the hospital encounter of 05/03/25 (from the past 24 hours)   CBC and Auto Differential   Result Value Ref Range     WBC 10.0 4.4 - 11.3 x10*3/uL     nRBC 0.2 (H) 0.0 - 0.0 /100 WBCs     RBC 2.03 (L) 4.50 - 5.90 x10*6/uL     Hemoglobin 6.3 (LL) 13.5 - 17.5 g/dL     Hematocrit 20.8 (L) 41.0 - 52.0 %      (H) 80 - 100 fL     MCH 31.0 26.0 - 34.0 pg     MCHC 30.3 (L) 32.0 - 36.0 g/dL     RDW 15.3 (H) 11.5 - 14.5 %     Platelets 241 150 - 450 x10*3/uL     Neutrophils % 78.6 40.0 - 80.0 %     Immature Granulocytes %, Automated 0.7 0.0 - 0.9 %     Lymphocytes % 11.9 13.0 - 44.0 %     Monocytes % 6.2 2.0 - 10.0 %     Eosinophils % 2.2 0.0 - 6.0 %     Basophils % 0.4 0.0 - 2.0 %     Neutrophils Absolute 7.89 (H) 1.60 - 5.50 x10*3/uL     Immature Granulocytes Absolute, Automated 0.07 0.00 - 0.50 x10*3/uL     Lymphocytes Absolute 1.19 0.80 - 3.00 x10*3/uL     Monocytes Absolute 0.62 0.05 - 0.80 x10*3/uL     Eosinophils Absolute 0.22 0.00 - 0.40 x10*3/uL     Basophils Absolute 0.04 0.00 - 0.10 x10*3/uL   Comprehensive metabolic panel   Result Value Ref Range     Glucose 240 (H) 74 - 99 mg/dL     Sodium 139 136 - 145 mmol/L     Potassium 4.3 3.5 - 5.3 mmol/L     Chloride 103 98 - 107 mmol/L     Bicarbonate 28 21 - 32 mmol/L     Anion Gap 12 10 - 20 mmol/L     Urea Nitrogen 55 (H) 6 - 23 mg/dL     Creatinine 1.60 (H) 0.50 - 1.30 mg/dL     eGFR 40 (L) >60 mL/min/1.73m*2     Calcium 9.6 8.6 - 10.3 mg/dL     Albumin 3.6 3.4 - 5.0 g/dL     Alkaline Phosphatase 65 33 - 136 U/L     Total Protein 6.3 (L) 6.4 - 8.2 g/dL     AST 17 9 - 39 U/L     Bilirubin, Total 0.3 0.0 - 1.2 " mg/dL     ALT 20 10 - 52 U/L   Protime-INR   Result Value Ref Range     Protime >100.0 (HH) 9.8 - 12.4 seconds     INR       Troponin I, High Sensitivity   Result Value Ref Range     Troponin I, High Sensitivity 13 0 - 20 ng/L   Type and screen   Result Value Ref Range     ABO TYPE AB       Rh TYPE POS       ANTIBODY SCREEN NEG     Magnesium   Result Value Ref Range     Magnesium 1.93 1.60 - 2.40 mg/dL   Verify ABO/Rh Group Test (VERAB)   Result Value Ref Range     ABO TYPE AB       Rh TYPE POS     Prepare RBC: 2 Units   Result Value Ref Range     PRODUCT CODE K1027C01       Unit Number [id number]       Unit ABO A       Unit RH POS       XM INTEP COMP       Dispense Status TR       Blood Expiration Date 5/23/2025 11:59:00 PM EDT       PRODUCT BLOOD TYPE 6200       UNIT VOLUME 350       PRODUCT CODE E1516W08       Unit Number [id number]       Unit ABO A       Unit RH POS       XM INTEP COMP       Dispense Status IS       Blood Expiration Date 5/23/2025 11:59:00 PM EDT       PRODUCT BLOOD TYPE 6200       UNIT VOLUME 350     Sars-CoV-2, Influenza A/B and RSV PCR   Result Value Ref Range     Coronavirus 2019, PCR Not Detected Not Detected     Flu A Result Not Detected Not Detected     Flu B Result Not Detected Not Detected     RSV PCR Not Detected Not Detected   Legionella Antigen, Urine     Specimen: Clean Catch/Voided; Urine   Result Value Ref Range     L. pneumophila Urine Ag Negative Negative   Streptococcus pneumoniae Antigen, Urine     Specimen: Clean Catch/Voided; Urine   Result Value Ref Range     Streptococcus pneumoniae Ag, Urine Negative Negative   POCT GLUCOSE   Result Value Ref Range     POCT Glucose 279 (H) 74 - 99 mg/dL   POCT GLUCOSE   Result Value Ref Range     POCT Glucose 227 (H) 74 - 99 mg/dL   Hemoglobin and hematocrit, blood   Result Value Ref Range     Hemoglobin 8.8 (L) 13.5 - 17.5 g/dL     Hematocrit 28.0 (L) 41.0 - 52.0 %   CBC   Result Value Ref Range     WBC 8.2 4.4 - 11.3 x10*3/uL      nRBC 0.4 (H) 0.0 - 0.0 /100 WBCs     RBC 2.89 (L) 4.50 - 5.90 x10*6/uL     Hemoglobin 8.8 (L) 13.5 - 17.5 g/dL     Hematocrit 28.0 (L) 41.0 - 52.0 %     MCV 97 80 - 100 fL     MCH 30.4 26.0 - 34.0 pg     MCHC 31.4 (L) 32.0 - 36.0 g/dL     RDW 16.9 (H) 11.5 - 14.5 %     Platelets 211 150 - 450 x10*3/uL   Basic metabolic panel   Result Value Ref Range     Glucose 243 (H) 74 - 99 mg/dL     Sodium 137 136 - 145 mmol/L     Potassium 3.9 3.5 - 5.3 mmol/L     Chloride 99 98 - 107 mmol/L     Bicarbonate 29 21 - 32 mmol/L     Anion Gap 13 10 - 20 mmol/L     Urea Nitrogen 56 (H) 6 - 23 mg/dL     Creatinine 1.67 (H) 0.50 - 1.30 mg/dL     eGFR 38 (L) >60 mL/min/1.73m*2     Calcium 9.1 8.6 - 10.3 mg/dL   Protime-INR   Result Value Ref Range     Protime 16.5 (H) 9.8 - 12.4 seconds     INR 1.5 (H) 0.9 - 1.1   POCT GLUCOSE   Result Value Ref Range     POCT Glucose 227 (H) 74 - 99 mg/dL      XR chest 1 view  Result Date: 5/3/2025  Interpreted By:  Cleve Cardenas, STUDY: XR CHEST 1 VIEW; 5/3/2025 2:02 pm   INDICATION: Signs/Symptoms:anemia.   COMPARISON: Chest x-ray 12/07/2024   ACCESSION NUMBER(S): [id number]   ORDERING CLINICIAN: SUMAN HENRIQUEZ   TECHNIQUE: 1 view of the chest was performed.   FINDINGS: Indeterminate bilateral lower lobe nodular infiltrates could be infectious. No pleural effusion. No pneumothorax.  The cardiomediastinal silhouette is within normal limits. Bilateral shoulder joint degenerative changes.        1. New scattered bilateral lower lobe predominant nodular infiltrates could be infectious. Finding could be further assessed by dedicated chest CT scan.   Signed by: Cleve Cardenas 5/3/2025 2:11 PM Dictation workstation:   [id number]        Assessment & Plan  Anemia, unspecified type     Supratherapeutic INR     Pneumonia     Productive cough     Shortness of breath     Generalized weakness        Saul Asif is a 89 y.o. male presents to the ED from facility for evaluation of low hemoglobin level.   Hemoglobin level 6.3 this morning on lab work at facility.  Patient reports extreme fatigue over the past 2 days.  Also notes dark stools however denies noticing any blood in his stools.  Denies any blood in his urine or coughing up any blood.  Has had blood transfusions in the past.  Reports some generalized weakness over the past couple days and generally not feeling very well.  Patient's family also notes shortness of breath and productive cough over the past week, granddaughter stating his breathing sounded horrible last week and when she was visiting him.  States the facility did a chest x-ray at that time which was unremarkable.      CODE STATUS: DNR, DNI     #Acute on chronic anemia  #Supratherapeutic INR  #Atrial fibrillation on Coumadin  #Generalized weakness/fatigue  Admit for observation with telemetry monitoring  -Currently taking 2.5 mg Coumadin daily for history of atrial fibrillation.  Patient does report dark stools.  PT level greater than 100, unable to calculate INR level.  2 units RBCs ordered in the ED  Repeat H&H ordered after transfusion, continue to trend, transfuse as needed for hemoglobin less than 7  5 mg IV vitamin K ordered  Stool occult blood ordered  40 mg IV Protonix daily  Monitor daily PT/INR level  Q 4 vitals  CBC, BMP in the a.m.  Cardiac/diabetic/renal diet  Fall precautions, PT/OT for evaluation and treatment  Social work consult for discharge planning     #Suspect pneumonia  #Productive cough  #Intermittent shortness of breath, worse over the past week  Chest x-ray shows new scattered bilateral lower lobe predominant nodular infiltrates  Patient afebrile, no leukocytosis.  Urine pneumonia antigens ordered, procalcitonin ordered  IV Rocephin and IV azithromycin ordered  Cough medication, breathing treatments, oxygen as needed  COVID, flu, RSV ordered     #Diabetes mellitus  Hold home oral antidiabetic medications  Sliding scale insulin  ACHS Accu-Cheks  Tresiba 8 units daily      Continue home medications as appropriate     Chronic conditions:  A-fib, CKD, HLD, hypothyroidism, prostate cancer, aortic valve stenosis, type II DM     #DVT prophylaxis  Hold home oral anticoagulation  SCDs, ambulation as tolerated           had concerns including Weakness, Gen (Pt BIBA with c/o generalized weakness and fatigue x2 days. Pt had labs drawn at [facility] which revealed a Hgb of 6.3, pt endorses dark stools. Alert and Cahto. Pt is also on coumadin. Denies CP or SOB. ).        Problem List Items Addressed This Visit         Pneumonia     * (Principal) Anemia, unspecified type - Primary      Other Visit Diagnoses           On warfarin for atrial fibrillation (Multi)           Dark stools           Elevated protime                    HPI         Weakness, Gen     Additional comments: Pt BIBA with c/o generalized weakness and fatigue x2 days. Pt had labs drawn at [facility] which revealed a Hgb of 6.3, pt endorses dark stools. Alert and Cahto. Pt is also on coumadin. Denies CP or SOB.             Last edited by Deepthi Olivia RN on 5/3/2025  1:40 PM.             Problem List as of 5/4/2025 Reviewed: 2/27/2025 10:45 AM by Matthew Tubbs MD        Abdominal bloating     Abdominal pain     Abnormal chest xray     Acquired hypothyroidism     Asymptomatic cholelithiasis     Atrial fibrillation (Multi)     Kraus's esophagus     Diabetes mellitus (Multi)     Gastric ulcer     Helicobacter pylori (H. pylori) infection     HH (hiatus hernia)     History of colon polyps     Hyperlipidemia     Iron deficiency anemia     Irritable bowel syndrome     Leg joint pain     Severe aortic stenosis     Last Assessment & Plan 2/27/2025 Office Visit Written 2/27/2025 10:49 AM by Matthew Tubbs MD   2/27/25 echocqrdiogram svere aortic stenosis and moderate AI LVEF = 65-70%, severe MAC with moderate MR,  Tr with midly elevated RVSP (reviewed today)           Muscular pain     Personal history of Helicobacter infection      Pneumonia     Presence of artificial knee joint     Right hip pain     Tubular adenoma     Stage 4 chronic kidney disease (Multi)     Paroxysmal atrial fibrillation (Multi)     Prostate cancer (Multi)     Squamous cell cancer of skin of left cheek     Secondary hyperparathyroidism of renal origin (Multi)     RBBB     * (Principal) Anemia, unspecified type     Supratherapeutic INR     Productive cough     Shortness of breath     Generalized weakness              Active Ambulatory Problems     Diagnosis Date Noted    Abdominal bloating 10/12/2023    Abdominal pain 10/12/2023    Abnormal chest xray 10/12/2023    Acquired hypothyroidism 10/12/2023    Asymptomatic cholelithiasis 10/12/2023    Atrial fibrillation (Multi) 10/12/2023    Kraus's esophagus 10/12/2023    Diabetes mellitus (Multi) 10/12/2023    Gastric ulcer 10/12/2023    Helicobacter pylori (H. pylori) infection 10/12/2023    HH (hiatus hernia) 10/12/2023    History of colon polyps 10/12/2023    Hyperlipidemia 10/12/2023    Iron deficiency anemia 10/12/2023    Irritable bowel syndrome 10/12/2023    Leg joint pain 10/12/2023    Severe aortic stenosis 10/12/2023    Muscular pain 10/12/2023    Personal history of Helicobacter infection 10/12/2023    Pneumonia 10/12/2023    Presence of artificial knee joint 10/12/2023    Right hip pain 10/12/2023    Tubular adenoma 10/12/2023    Stage 4 chronic kidney disease (Multi) 10/18/2023    Paroxysmal atrial fibrillation (Multi) 02/15/2024    Prostate cancer (Multi) 11/09/2017    Squamous cell cancer of skin of left cheek 11/13/2017    Secondary hyperparathyroidism of renal origin (Multi) 04/23/2024    RBBB 02/27/2025           Resolved Ambulatory Problems     Diagnosis Date Noted    Obesity, Class I, BMI 30.0-34.9 (see actual BMI) 10/12/2023    COVID-19 12/27/2024    COVID 12/28/2024      No Additional Past Medical History                    Active Orders   Microbiology     Occult Blood, Stool       Frequency: Once        Number of Occurrences: 1 Occurrences   Lab     CBC       Frequency: AM draw       Number of Occurrences: 1 Occurrences     Comprehensive Metabolic Panel       Frequency: AM draw       Number of Occurrences: 1 Occurrences     Protime-INR       Frequency: AM draw       Number of Occurrences: 3 Days   Diet     Adult diet Cardiac, Renal, Consistent Carb; CCD 75 gm/meal; 70 gm fat; 2 - 3 grams Sodium; Potassium Restricted 2 gm (50mEq)       Frequency: Effective now       Number of Occurrences: Until Specified   Nursing     Activity (specify) Ambulate; Ambulate with Assistance; As Tolerated       Frequency: Until discontinued       Number of Occurrences: Until Specified     Apply sequential compression device       Frequency: Until discontinued       Number of Occurrences: Until Specified     Check pulse oximetry       Frequency: q8h       Number of Occurrences: Until Specified     Glucose 10-70 mg/dL & CONSCIOUS- Give 15 Grams of Carbohydrates and repeat until blood glucose level reaches 100 mg/dL or greater.       Frequency: Until discontinued       Number of Occurrences: Until Specified       Order Comments: Select 1 of the following:  -1 cup skim milk  -3 or 4 glucose tablets  -4 ounces of fruit juice or regular soda.  Patient MUST be CONSCIOUS and able to eat or drink        Notify provider       Frequency: Until discontinued       Number of Occurrences: Until Specified     Notify provider (specify parameters)       Frequency: Until discontinued       Number of Occurrences: Until Specified       Order Comments: [place] Hypoglycemia interventions.        Notify provider (specify parameters)       Frequency: Until discontinued       Number of Occurrences: Until Specified       Order Comments: For blood glucose greater than 200 mg/dL twice over 24 hours.  Provider to consider Endocrine Consult.        Notify provider (specify parameters)       Frequency: Until discontinued       Number of Occurrences: Until Specified      Pain Assessment       Frequency: q4h       Number of Occurrences: Until Specified     Pulse Oximetry       Frequency: Until discontinued       Number of Occurrences: Until Specified     Vital Signs       Frequency: Per unit standards       Number of Occurrences: Until Specified     Vital Signs       Frequency: q4h       Number of Occurrences: Until Specified     Vital signs for transfusion       Frequency: Per unit standards       Number of Occurrences: Until Specified       Order Comments: Document required Vital Signs: Immediately prior to transfusion, within the first 15 minutes of transfusion, 45-60 minutes after infusion starts, and within 1 hour of completion of transfusion.        Vital signs for transfusion       Frequency: Per unit standards       Number of Occurrences: Until Specified       Order Comments: Document required Vital Signs: Immediately prior to transfusion, within the first 15 minutes of transfusion, 45-60 minutes after infusion starts, and within 1 hour of completion of transfusion.        Weight on admission       Frequency: Daily       Number of Occurrences: Until Specified   Code Status     DNR and No Intubation       Frequency: Continuous       Number of Occurrences: Until Specified   Consult     Inpatient consult to Social Work and TCC       Frequency: Once       Number of Occurrences: 1 Occurrences   Nourishments     May Participate in Room Service       Frequency: Once       Number of Occurrences: 1 Occurrences   OT     OT eval and treat       Frequency: Until therapy completed       Number of Occurrences: 1 Occurrences   PT     PT eval and treat       Frequency: Until therapy completed       Number of Occurrences: 1 Occurrences   Respiratory Care     Airway Clearance Techniques Device/modality: EzPap       Frequency: TID       Number of Occurrences: Until Specified     Respiratory care eval and treat       Frequency: Once       Number of Occurrences: 1 Occurrences       Order  Comments: Triage 5  Due 5/6 0700      ECG     ECG 12 lead       Frequency: Once       Number of Occurrences: 1 Occurrences     Electrocardiogram, 12-lead PRN ACS symptoms       Frequency: PRN       Number of Occurrences: Until Specified       Order Comments: Notify provider if performed.      Precaution     Fall precautions       Frequency: Until discontinued       Number of Occurrences: Until Specified   Point of Care Testing - Docked Device     POCT Glucose       Frequency: 4x daily - AC and at bedtime       Number of Occurrences: 3 Days       Order Comments: And PRN           POCT Glucose       Frequency: PRN       Number of Occurrences: Until Specified       Order Comments: PRN for hypoglycemia interventions instituted.  Retest blood glucose level every 15 minutes after every hypoglycemic intervention until blood glucose is greater than 100 mg/dL.         Telemetry     Telemetry monitoring - Floor only       Frequency: Until discontinued       Number of Occurrences: 3 Days   Medications     acetaminophen (Tylenol) oral liquid 650 mg       Linked Order: Or       Frequency: q4h PRN       Dose: 650 mg       Route: oral     acetaminophen (Tylenol) suppository 650 mg       Linked Order: Or       Frequency: q4h PRN       Dose: 650 mg       Route: rectal     acetaminophen (Tylenol) tablet 650 mg       Linked Order: Or       Frequency: q4h PRN       Dose: 650 mg       Route: oral     atorvastatin (Lipitor) tablet 10 mg       Frequency: Nightly       Dose: 10 mg       Route: oral     azithromycin (Zithromax) 500 mg in dextrose 5% 250 mL IV       Frequency: q24h       Dose: 500 mg       Route: intravenous     brimonidine (AlphaGAN) 0.2 % ophthalmic solution 1 drop       Frequency: BID       Dose: 1 drop       Route: Both Eyes     cefTRIAXone (Rocephin) 2 g in dextrose (iso) IV 50 mL       Frequency: q24h       Dose: 2 g       Route: intravenous     cholecalciferol (Vitamin D-3) tablet 25 mcg       Frequency: Daily        Dose: 25 mcg       Route: oral     dextrose 50 % injection 12.5 g       Frequency: q15 min PRN       Dose: 12.5 g       Route: intravenous     dextrose 50 % injection 25 g       Frequency: q15 min PRN       Dose: 25 g       Route: intravenous     docusate sodium (Colace) capsule 100 mg       Frequency: Nightly       Dose: 100 mg       Route: oral     ferrous sulfate tablet 325 mg       Frequency: Nightly       Dose: 1 tablet       Route: oral     glucagon (Glucagen) injection 1 mg       Frequency: q15 min PRN       Dose: 1 mg       Route: intramuscular     glucagon (Glucagen) injection 1 mg       Frequency: q15 min PRN       Dose: 1 mg       Route: intramuscular     guaiFENesin (Mucinex) 12 hr tablet 600 mg       Frequency: BID       Dose: 600 mg       Route: oral     insulin glargine (Lantus) injection 10 Units       Frequency: Nightly       Dose: 10 Units       Route: subcutaneous     insulin lispro injection 0-10 Units       Frequency: TID AC       Dose: 0-10 Units       Route: subcutaneous     ipratropium (Atrovent) 0.02 % nebulizer solution 0.5 mg       Frequency: TID       Dose: 0.5 mg       Route: nebulization     ipratropium-albuteroL (Duo-Neb) 0.5-2.5 mg/3 mL nebulizer solution 3 mL       Frequency: q2h PRN       Dose: 3 mL       Route: nebulization     levothyroxine (Synthroid, Levoxyl) tablet 100 mcg       Frequency: Daily       Dose: 100 mcg       Route: oral     melatonin tablet 5 mg       Frequency: Nightly PRN       Dose: 5 mg       Route: oral     oxygen (O2) therapy       Frequency: Continuous PRN - O2/gases       Route: inhalation     pantoprazole (Protonix) injection 40 mg       Frequency: Daily       Dose: 40 mg       Route: intravenous     torsemide (Demadex) tablet 20 mg       Frequency: Daily       Dose: 20 mg       Route: oral     traZODone (Desyrel) tablet 25 mg       Frequency: Nightly       Dose: 25 mg       Route: oral     warfarin (Coumadin) tablet 2.5 mg       Frequency: Daily        "Dose: 2.5 mg       Route: oral       Order Comments: Ensure proper admin timing per warfarin policy.      Dietary Orders (From admission, onward)          Start     Ordered     05/03/25 1658   May Participate in Room Service  ( ROOM SERVICE MAY PARTICIPATE)  Once        Question:  .  Answer:  Yes    05/03/25 1657 05/03/25 1645   Adult diet Cardiac, Renal, Consistent Carb; CCD 75 gm/meal; 70 gm fat; 2 - 3 grams Sodium; Potassium Restricted 2 gm (50mEq)  Diet effective now        Question Answer Comment   Diet type Cardiac     Diet type Renal     Diet type Consistent Carb     Carb diet selection: CCD 75 gm/meal     Fat restriction: 70 gm fat     Sodium restriction: 2 - 3 grams Sodium     Potassium restriction: Potassium Restricted 2 gm (50mEq)         05/03/25 1655                       89 yrs@  ADMITDTTM@  Dark stools [R19.5]  Elevated protime [R79.1]  Anemia, unspecified type [D64.9]  On warfarin for atrial fibrillation (Multi) [I48.91, Z79.01]  [unfilled]  Weight: 81.6 kg (180 lb)     Vitals          Vitals:     05/02/25 2100 05/03/25 1330 05/03/25 1341 05/03/25 1345   BP: 118/85 111/72 111/72     Pulse: 82 86 88 87   Resp: 16   18 (!) 25   Temp: 36.5 °C (97.7 °F)   36.4 °C (97.5 °F)     TempSrc: Temporal   Temporal     SpO2: 97% 94% (!) 92% 96%   Weight:     81.6 kg (180 lb)     Height:     1.753 m (5' 9\")       05/03/25 1400 05/03/25 1415 05/03/25 1430 05/03/25 1445   BP:     133/58     Pulse: 88 84 79 80   Resp: (!) 22 20 19 19   Temp:           TempSrc:           SpO2: 96% 98% 95% 95%   Weight:           Height:             05/03/25 1500 05/03/25 1515 05/03/25 1530 05/03/25 1545   BP: 115/57         Pulse: 81 87 88 85   Resp: 20 (!) 24 (!) 23 (!) 25   Temp:           TempSrc:           SpO2: 97% 97% 97% 96%   Weight:           Height:             05/03/25 1600 05/03/25 1642 05/03/25 1645 05/03/25 1826   BP:   147/67 147/67 125/60   Pulse: 92 88 88 77   Resp: (!) 33 16   18   Temp:   36.4 °C (97.5 °F) 36.4 " "°C (97.5 °F) 36.3 °C (97.3 °F)   TempSrc:   Temporal   Temporal   SpO2: 96%   95% 94%   Weight:   81.6 kg (180 lb)       Height:   1.753 m (5' 9.02\")         05/03/25 1841 05/03/25 1926 05/03/25 2041 05/04/25 0042   BP: 126/60 118/85 118/85 122/58   Pulse: 98  Comment: sitting upright for dinner 82 82 71   Resp: 18 16 16 16   Temp: 36.3 °C (97.3 °F) 36.5 °C (97.7 °F) 36.5 °C (97.7 °F) 35.8 °C (96.4 °F)   TempSrc: Temporal Temporal Temporal Temporal   SpO2: 94% 97% 97% 93%   Weight:           Height:             05/04/25 0056 05/04/25 0142 05/04/25 0345 05/04/25 0402   BP: (!) 111/6 109/59 124/60 124/60   Pulse: 73 78 72 72   Resp: 18 18 16 16   Temp: 36.3 °C (97.3 °F) 35.5 °C (95.9 °F) 36 °C (96.8 °F) 36 °C (96.8 °F)   TempSrc: Temporal Temporal Temporal Temporal   SpO2: 97% 96% 93% 93%   Weight:           Height:             05/04/25 0637 05/04/25 1058 05/04/25 1155   BP: 131/63 108/56     Pulse: 72 72     Resp: 16       Temp: 36.2 °C (97.2 °F) 36 °C (96.8 °F)     TempSrc: Temporal       SpO2: 95% 94% 95%   Weight:         Height:                        Chief Complaint    Weakness, Gen            Patient seen resting in bed with head of bed elevated, no s/s or c/o acute difficulties at this time.  Vital signs for last 24 hours Temp:  [35.5 °C (95.9 °F)-36.5 °C (97.7 °F)] 36 °C (96.8 °F)  Heart Rate:  [71-98] 72  Resp:  [16-33] 16  BP: (108-147)/(6-85) 108/56    No intake/output data recorded.  Patient still requiring frequent cardiac and SPO2 monitoring. Continue aggressive pulmonary hygiene and oral hygiene. Off loading as tolerated for skin integrity. Medications and labs reviewed-    Patient recently received an antibiotic (last 12 hours)         None                    Results for orders placed or performed during the hospital encounter of 05/03/25 (from the past 96 hours)   CBC and Auto Differential   Result Value Ref Range     WBC 10.0 4.4 - 11.3 x10*3/uL     nRBC 0.2 (H) 0.0 - 0.0 /100 WBCs     RBC 2.03 (L) " 4.50 - 5.90 x10*6/uL     Hemoglobin 6.3 (LL) 13.5 - 17.5 g/dL     Hematocrit 20.8 (L) 41.0 - 52.0 %      (H) 80 - 100 fL     MCH 31.0 26.0 - 34.0 pg     MCHC 30.3 (L) 32.0 - 36.0 g/dL     RDW 15.3 (H) 11.5 - 14.5 %     Platelets 241 150 - 450 x10*3/uL     Neutrophils % 78.6 40.0 - 80.0 %     Immature Granulocytes %, Automated 0.7 0.0 - 0.9 %     Lymphocytes % 11.9 13.0 - 44.0 %     Monocytes % 6.2 2.0 - 10.0 %     Eosinophils % 2.2 0.0 - 6.0 %     Basophils % 0.4 0.0 - 2.0 %     Neutrophils Absolute 7.89 (H) 1.60 - 5.50 x10*3/uL     Immature Granulocytes Absolute, Automated 0.07 0.00 - 0.50 x10*3/uL     Lymphocytes Absolute 1.19 0.80 - 3.00 x10*3/uL     Monocytes Absolute 0.62 0.05 - 0.80 x10*3/uL     Eosinophils Absolute 0.22 0.00 - 0.40 x10*3/uL     Basophils Absolute 0.04 0.00 - 0.10 x10*3/uL   Comprehensive metabolic panel   Result Value Ref Range     Glucose 240 (H) 74 - 99 mg/dL     Sodium 139 136 - 145 mmol/L     Potassium 4.3 3.5 - 5.3 mmol/L     Chloride 103 98 - 107 mmol/L     Bicarbonate 28 21 - 32 mmol/L     Anion Gap 12 10 - 20 mmol/L     Urea Nitrogen 55 (H) 6 - 23 mg/dL     Creatinine 1.60 (H) 0.50 - 1.30 mg/dL     eGFR 40 (L) >60 mL/min/1.73m*2     Calcium 9.6 8.6 - 10.3 mg/dL     Albumin 3.6 3.4 - 5.0 g/dL     Alkaline Phosphatase 65 33 - 136 U/L     Total Protein 6.3 (L) 6.4 - 8.2 g/dL     AST 17 9 - 39 U/L     Bilirubin, Total 0.3 0.0 - 1.2 mg/dL     ALT 20 10 - 52 U/L   Protime-INR   Result Value Ref Range     Protime >100.0 (HH) 9.8 - 12.4 seconds     INR       Troponin I, High Sensitivity   Result Value Ref Range     Troponin I, High Sensitivity 13 0 - 20 ng/L   Type and screen   Result Value Ref Range     ABO TYPE AB       Rh TYPE POS       ANTIBODY SCREEN NEG     Magnesium   Result Value Ref Range     Magnesium 1.93 1.60 - 2.40 mg/dL   Verify ABO/Rh Group Test (VERAB)   Result Value Ref Range     ABO TYPE AB       Rh TYPE POS     Prepare RBC: 2 Units   Result Value Ref Range      PRODUCT CODE [id number]       Unit Number [id number]       Unit ABO A       Unit RH POS       XM INTEP COMP       Dispense Status TR       Blood Expiration Date 5/23/2025 11:59:00 PM EDT       PRODUCT BLOOD TYPE 6200       UNIT VOLUME 350       PRODUCT CODE F1282V83       Unit Number [id number]       Unit ABO A       Unit RH POS       XM INTEP COMP       Dispense Status IS       Blood Expiration Date 5/23/2025 11:59:00 PM EDT       PRODUCT BLOOD TYPE 6200       UNIT VOLUME 350     Sars-CoV-2, Influenza A/B and RSV PCR   Result Value Ref Range     Coronavirus 2019, PCR Not Detected Not Detected     Flu A Result Not Detected Not Detected     Flu B Result Not Detected Not Detected     RSV PCR Not Detected Not Detected   Legionella Antigen, Urine     Specimen: Clean Catch/Voided; Urine   Result Value Ref Range     L. pneumophila Urine Ag Negative Negative   Streptococcus pneumoniae Antigen, Urine     Specimen: Clean Catch/Voided; Urine   Result Value Ref Range     Streptococcus pneumoniae Ag, Urine Negative Negative   POCT GLUCOSE   Result Value Ref Range     POCT Glucose 279 (H) 74 - 99 mg/dL   POCT GLUCOSE   Result Value Ref Range     POCT Glucose 227 (H) 74 - 99 mg/dL   Hemoglobin and hematocrit, blood   Result Value Ref Range     Hemoglobin 8.8 (L) 13.5 - 17.5 g/dL     Hematocrit 28.0 (L) 41.0 - 52.0 %   CBC   Result Value Ref Range     WBC 8.2 4.4 - 11.3 x10*3/uL     nRBC 0.4 (H) 0.0 - 0.0 /100 WBCs     RBC 2.89 (L) 4.50 - 5.90 x10*6/uL     Hemoglobin 8.8 (L) 13.5 - 17.5 g/dL     Hematocrit 28.0 (L) 41.0 - 52.0 %     MCV 97 80 - 100 fL     MCH 30.4 26.0 - 34.0 pg     MCHC 31.4 (L) 32.0 - 36.0 g/dL     RDW 16.9 (H) 11.5 - 14.5 %     Platelets 211 150 - 450 x10*3/uL   Basic metabolic panel   Result Value Ref Range     Glucose 243 (H) 74 - 99 mg/dL     Sodium 137 136 - 145 mmol/L     Potassium 3.9 3.5 - 5.3 mmol/L     Chloride 99 98 - 107 mmol/L     Bicarbonate 29 21 - 32 mmol/L     Anion Gap 13 10 - 20 mmol/L      Urea Nitrogen 56 (H) 6 - 23 mg/dL     Creatinine 1.67 (H) 0.50 - 1.30 mg/dL     eGFR 38 (L) >60 mL/min/1.73m*2     Calcium 9.1 8.6 - 10.3 mg/dL   Protime-INR   Result Value Ref Range     Protime 16.5 (H) 9.8 - 12.4 seconds     INR 1.5 (H) 0.9 - 1.1   POCT GLUCOSE   Result Value Ref Range     POCT Glucose 227 (H) 74 - 99 mg/dL           Patient fully evaluated 05/03, thorough record review performed of previous labs and notes from prior encounters. Plan discussed with interdisciplinary team, consults placed, appreciate input. Will continue current and repeat labs in the AM.          Discharge planning discussed with patient and care team. Therapy evaluations ordered. Patient aware and agreeable to current plan, continue plan as above.  Pulmonology consultation obtained, appreciate input.     I spent a total of 75 minutes on the date of the service which included preparing to see the patient, face-to-face patient care, completing clinical documentation, obtaining and/or reviewing separately obtained history, performing a medically appropriate examination, counseling and educating the patient/family/caregiver, ordering medications, tests, or procedures, communicating with other HCPs (not separately reported), independently interpreting results (not separately reported), communicating results to the patient/family/caregiver, and care coordination (not separately reported).         Ngozi Lea

## 2025-05-04 NOTE — CARE PLAN
The patient's goals for the shift include sleep, comfort and safety    The clinical goals for the shift include patient will remain safe and free from injury for entire shift.

## 2025-05-04 NOTE — H&P
History Of Present Illness  Salu Asif is a 89 y.o. male with past medical history significant for atrial fibrillation on Coumadin, CKD, HLD, hypothyroidism, prostate cancer, aortic valve stenosis, and type 2 diabetes mellitus who presents to the ED from facility for evaluation of low hemoglobin level.  Hemoglobin level 6.3 this morning on lab work at facility.  Patient reports extreme fatigue over the past 2 days.  Also notes dark stools however denies noticing any blood in his stools.  Denies any blood in his urine or coughing up any blood.  Has had blood transfusions in the past.  Reports some generalized weakness over the past couple days and generally not feeling very well.  Patient's family also notes shortness of breath and productive cough over the past week, granddaughter stating his breathing sounded horrible last week and when she was visiting him.  States the facility did a chest x-ray at that time which was unremarkable.  Patient still reports congestion, coughing up yellow phlegm and intermittent shortness of breath.  Denies any fevers or chills.  Denies headaches, dizziness, chest pains, abdominal pain or nausea, appetite changes, or urinary changes.  Says he had COVID at the beginning of the year which was very hard on him.    ED course: On arrival to the ED, patient afebrile and hemodynamically stable with SpO2 92% on room air.  Glucose 240, electrolytes WNL, BUN 55/creatinine 1.6, LFTs WNL, troponin 13.  PT greater than 100, unable to calculate INR level.  WBC 10, hemoglobin 6.3/hematocrit 20.8, platelets 241.  Chest x-ray shows new scattered bilateral lower lobe predominant nodular infiltrates could be infectious.    EKG (interpreted by ED physician): Sinus rhythm with prolonged [place] interval, rate of 85, RBBB, no ST segment depression or elevation consistent with ischemia or infarction.         Past Medical History  Medical History[1]    Surgical History  Surgical History[2]     Social  "History  He reports that he has quit smoking. His smoking use included cigarettes. He has never used smokeless tobacco. No history on file for alcohol use and drug use.    Family History  Family History[3]     Allergies  Patient has no known allergies.    Review of Systems   All other systems reviewed and are negative.    10 point review system negative except as noted above in HPI    Physical Exam  Vitals and nursing note reviewed.   Constitutional:       General: He is not in acute distress.     Appearance: Normal appearance. He is not toxic-appearing.   HENT:      Head: Normocephalic and atraumatic.      Mouth/Throat:      Pharynx: Oropharynx is clear.   Eyes:      Conjunctiva/sclera: Conjunctivae normal.   Cardiovascular:      Rate and Rhythm: Normal rate and regular rhythm.   Pulmonary:      Effort: Pulmonary effort is normal.      Breath sounds: No wheezing.      Comments: Coarse breath sounds bilaterally.  Currently on room air, SpO2 93%.  Tachypneic.  Abdominal:      General: Bowel sounds are normal. There is no distension.      Palpations: Abdomen is soft.      Tenderness: There is no abdominal tenderness.   Musculoskeletal:         General: Normal range of motion.      Right lower leg: No edema.      Left lower leg: No edema.   Skin:     General: Skin is warm and dry.   Neurological:      Mental Status: He is alert and oriented to person, place, and time.   Psychiatric:         Behavior: Behavior normal.       Last Recorded Vitals  Blood pressure 108/56, pulse 72, temperature 36 °C (96.8 °F), resp. rate 16, height 1.753 m (5' 9.02\"), weight 81.6 kg (180 lb), SpO2 95%.    Relevant Results  Results for orders placed or performed during the hospital encounter of 05/03/25 (from the past 24 hours)   CBC and Auto Differential   Result Value Ref Range    WBC 10.0 4.4 - 11.3 x10*3/uL    nRBC 0.2 (H) 0.0 - 0.0 /100 WBCs    RBC 2.03 (L) 4.50 - 5.90 x10*6/uL    Hemoglobin 6.3 (LL) 13.5 - 17.5 g/dL    Hematocrit 20.8 " (L) 41.0 - 52.0 %     (H) 80 - 100 fL    MCH 31.0 26.0 - 34.0 pg    MCHC 30.3 (L) 32.0 - 36.0 g/dL    RDW 15.3 (H) 11.5 - 14.5 %    Platelets 241 150 - 450 x10*3/uL    Neutrophils % 78.6 40.0 - 80.0 %    Immature Granulocytes %, Automated 0.7 0.0 - 0.9 %    Lymphocytes % 11.9 13.0 - 44.0 %    Monocytes % 6.2 2.0 - 10.0 %    Eosinophils % 2.2 0.0 - 6.0 %    Basophils % 0.4 0.0 - 2.0 %    Neutrophils Absolute 7.89 (H) 1.60 - 5.50 x10*3/uL    Immature Granulocytes Absolute, Automated 0.07 0.00 - 0.50 x10*3/uL    Lymphocytes Absolute 1.19 0.80 - 3.00 x10*3/uL    Monocytes Absolute 0.62 0.05 - 0.80 x10*3/uL    Eosinophils Absolute 0.22 0.00 - 0.40 x10*3/uL    Basophils Absolute 0.04 0.00 - 0.10 x10*3/uL   Comprehensive metabolic panel   Result Value Ref Range    Glucose 240 (H) 74 - 99 mg/dL    Sodium 139 136 - 145 mmol/L    Potassium 4.3 3.5 - 5.3 mmol/L    Chloride 103 98 - 107 mmol/L    Bicarbonate 28 21 - 32 mmol/L    Anion Gap 12 10 - 20 mmol/L    Urea Nitrogen 55 (H) 6 - 23 mg/dL    Creatinine 1.60 (H) 0.50 - 1.30 mg/dL    eGFR 40 (L) >60 mL/min/1.73m*2    Calcium 9.6 8.6 - 10.3 mg/dL    Albumin 3.6 3.4 - 5.0 g/dL    Alkaline Phosphatase 65 33 - 136 U/L    Total Protein 6.3 (L) 6.4 - 8.2 g/dL    AST 17 9 - 39 U/L    Bilirubin, Total 0.3 0.0 - 1.2 mg/dL    ALT 20 10 - 52 U/L   Protime-INR   Result Value Ref Range    Protime >100.0 (HH) 9.8 - 12.4 seconds    INR     Troponin I, High Sensitivity   Result Value Ref Range    Troponin I, High Sensitivity 13 0 - 20 ng/L   Type and screen   Result Value Ref Range    ABO TYPE AB     Rh TYPE POS     ANTIBODY SCREEN NEG    Magnesium   Result Value Ref Range    Magnesium 1.93 1.60 - 2.40 mg/dL   Verify ABO/Rh Group Test (VERAB)   Result Value Ref Range    ABO TYPE AB     Rh TYPE POS    Prepare RBC: 2 Units   Result Value Ref Range    PRODUCT CODE Z5645H22     Unit Number [id number]     Unit ABO A     Unit RH POS     XM INTEP COMP     Dispense Status TR     Blood  Expiration Date 5/23/2025 11:59:00 PM EDT     PRODUCT BLOOD TYPE 6200     UNIT VOLUME 350     PRODUCT CODE Q7902F46     Unit Number [id number]     Unit ABO A     Unit RH POS     XM INTEP COMP     Dispense Status IS     Blood Expiration Date 5/23/2025 11:59:00 PM EDT     PRODUCT BLOOD TYPE 6200     UNIT VOLUME 350    Sars-CoV-2, Influenza A/B and RSV PCR   Result Value Ref Range    Coronavirus 2019, PCR Not Detected Not Detected    Flu A Result Not Detected Not Detected    Flu B Result Not Detected Not Detected    RSV PCR Not Detected Not Detected   Legionella Antigen, Urine    Specimen: Clean Catch/Voided; Urine   Result Value Ref Range    L. pneumophila Urine Ag Negative Negative   Streptococcus pneumoniae Antigen, Urine    Specimen: Clean Catch/Voided; Urine   Result Value Ref Range    Streptococcus pneumoniae Ag, Urine Negative Negative   POCT GLUCOSE   Result Value Ref Range    POCT Glucose 279 (H) 74 - 99 mg/dL   POCT GLUCOSE   Result Value Ref Range    POCT Glucose 227 (H) 74 - 99 mg/dL   Hemoglobin and hematocrit, blood   Result Value Ref Range    Hemoglobin 8.8 (L) 13.5 - 17.5 g/dL    Hematocrit 28.0 (L) 41.0 - 52.0 %   CBC   Result Value Ref Range    WBC 8.2 4.4 - 11.3 x10*3/uL    nRBC 0.4 (H) 0.0 - 0.0 /100 WBCs    RBC 2.89 (L) 4.50 - 5.90 x10*6/uL    Hemoglobin 8.8 (L) 13.5 - 17.5 g/dL    Hematocrit 28.0 (L) 41.0 - 52.0 %    MCV 97 80 - 100 fL    MCH 30.4 26.0 - 34.0 pg    MCHC 31.4 (L) 32.0 - 36.0 g/dL    RDW 16.9 (H) 11.5 - 14.5 %    Platelets 211 150 - 450 x10*3/uL   Basic metabolic panel   Result Value Ref Range    Glucose 243 (H) 74 - 99 mg/dL    Sodium 137 136 - 145 mmol/L    Potassium 3.9 3.5 - 5.3 mmol/L    Chloride 99 98 - 107 mmol/L    Bicarbonate 29 21 - 32 mmol/L    Anion Gap 13 10 - 20 mmol/L    Urea Nitrogen 56 (H) 6 - 23 mg/dL    Creatinine 1.67 (H) 0.50 - 1.30 mg/dL    eGFR 38 (L) >60 mL/min/1.73m*2    Calcium 9.1 8.6 - 10.3 mg/dL   Protime-INR   Result Value Ref Range    Protime 16.5  (H) 9.8 - 12.4 seconds    INR 1.5 (H) 0.9 - 1.1   POCT GLUCOSE   Result Value Ref Range    POCT Glucose 227 (H) 74 - 99 mg/dL      XR chest 1 view  Result Date: 5/3/2025  Interpreted By:  Cleve Cardenas, STUDY: XR CHEST 1 VIEW; 5/3/2025 2:02 pm   INDICATION: Signs/Symptoms:anemia.   COMPARISON: Chest x-ray 12/07/2024   ACCESSION NUMBER(S): [id number]   ORDERING CLINICIAN: SUMAN HENRIQUEZ   TECHNIQUE: 1 view of the chest was performed.   FINDINGS: Indeterminate bilateral lower lobe nodular infiltrates could be infectious. No pleural effusion. No pneumothorax.  The cardiomediastinal silhouette is within normal limits. Bilateral shoulder joint degenerative changes.       1. New scattered bilateral lower lobe predominant nodular infiltrates could be infectious. Finding could be further assessed by dedicated chest CT scan.   Signed by: Cleve Cardenas 5/3/2025 2:11 PM Dictation workstation:   [id number]        Assessment & Plan  Anemia, unspecified type    Supratherapeutic INR    Pneumonia    Productive cough    Shortness of breath    Generalized weakness      Saul Asif is a 89 y.o. male presents to the ED from facility for evaluation of low hemoglobin level.  Hemoglobin level 6.3 this morning on lab work at facility.  Patient reports extreme fatigue over the past 2 days.  Also notes dark stools however denies noticing any blood in his stools.  Denies any blood in his urine or coughing up any blood.  Has had blood transfusions in the past.  Reports some generalized weakness over the past couple days and generally not feeling very well.  Patient's family also notes shortness of breath and productive cough over the past week, granddaughter stating his breathing sounded horrible last week and when she was visiting him.  States the facility did a chest x-ray at that time which was unremarkable.     CODE STATUS: DNR, DNI    #Acute on chronic anemia  #Supratherapeutic INR  #Atrial fibrillation on Coumadin  #Generalized  weakness/fatigue  Admit for observation with telemetry monitoring  -Currently taking 2.5 mg Coumadin daily for history of atrial fibrillation.  Patient does report dark stools.  PT level greater than 100, unable to calculate INR level.  2 units RBCs ordered in the ED  Repeat H&H ordered after transfusion, continue to trend, transfuse as needed for hemoglobin less than 7  5 mg IV vitamin K ordered  Stool occult blood ordered  40 mg IV Protonix daily  Monitor daily PT/INR level  Q 4 vitals  CBC, BMP in the a.m.  Cardiac/diabetic/renal diet  Fall precautions, PT/OT for evaluation and treatment  Social work consult for discharge planning    #Suspect pneumonia  #Productive cough  #Intermittent shortness of breath, worse over the past week  Chest x-ray shows new scattered bilateral lower lobe predominant nodular infiltrates  Patient afebrile, no leukocytosis.  Urine pneumonia antigens ordered, procalcitonin ordered  IV Rocephin and IV azithromycin ordered  Cough medication, breathing treatments, oxygen as needed  COVID, flu, RSV ordered    #Diabetes mellitus  Hold home oral antidiabetic medications  Sliding scale insulin  ACHS Accu-Cheks  Tresiba 8 units daily    Continue home medications as appropriate    Chronic conditions:  A-fib, CKD, HLD, hypothyroidism, prostate cancer, aortic valve stenosis, type II DM    #DVT prophylaxis  Hold home oral anticoagulation  SCDs, ambulation as tolerated         had concerns including Weakness, Gen (Pt BIBA with c/o generalized weakness and fatigue x2 days. Pt had labs drawn at [facility] which revealed a Hgb of 6.3, pt endorses dark stools. Alert and [place]. Pt is also on coumadin. Denies CP or SOB. ).       Problem List Items Addressed This Visit       Pneumonia    * (Principal) Anemia, unspecified type - Primary     Other Visit Diagnoses         On warfarin for atrial fibrillation (Multi)          Dark stools          Elevated protime                HPI       Weakness, Gen      Additional comments: Pt BIBA with c/o generalized weakness and fatigue x2 days. Pt had labs drawn at [facility] which revealed a Hgb of 6.3, pt endorses dark stools. Alert and [place]. Pt is also on coumadin. Denies CP or SOB.           Last edited by Deepthi Olivia RN on 5/3/2025  1:40 PM.          Problem List as of 5/4/2025 Reviewed: 2/27/2025 10:45 AM by Matthew Tubbs MD      Abdominal bloating    Abdominal pain    Abnormal chest xray    Acquired hypothyroidism    Asymptomatic cholelithiasis    Atrial fibrillation (Multi)    Kraus's esophagus    Diabetes mellitus (Multi)    Gastric ulcer    Helicobacter pylori (H. pylori) infection    HH (hiatus hernia)    History of colon polyps    Hyperlipidemia    Iron deficiency anemia    Irritable bowel syndrome    Leg joint pain    Severe aortic stenosis    Last Assessment & Plan 2/27/2025 Office Visit Written 2/27/2025 10:49 AM by Matthew Tubbs MD   2/27/25 echocqrdiogram svere aortic stenosis and moderate AI LVEF = 65-70%, severe MAC with moderate MR,  Tr with midly elevated RVSP (reviewed today)         Muscular pain    Personal history of Helicobacter infection    Pneumonia    Presence of artificial knee joint    Right hip pain    Tubular adenoma    Stage 4 chronic kidney disease (Multi)    Paroxysmal atrial fibrillation (Multi)    Prostate cancer (Multi)    Squamous cell cancer of skin of left cheek    Secondary hyperparathyroidism of renal origin (Multi)    RBBB    * (Principal) Anemia, unspecified type    Supratherapeutic INR    Productive cough    Shortness of breath    Generalized weakness       Active Ambulatory Problems     Diagnosis Date Noted    Abdominal bloating 10/12/2023    Abdominal pain 10/12/2023    Abnormal chest xray 10/12/2023    Acquired hypothyroidism 10/12/2023    Asymptomatic cholelithiasis 10/12/2023    Atrial fibrillation (Multi) 10/12/2023    Kraus's esophagus 10/12/2023    Diabetes mellitus (Multi) 10/12/2023    Gastric ulcer  10/12/2023    Helicobacter pylori (H. pylori) infection 10/12/2023    HH (hiatus hernia) 10/12/2023    History of colon polyps 10/12/2023    Hyperlipidemia 10/12/2023    Iron deficiency anemia 10/12/2023    Irritable bowel syndrome 10/12/2023    Leg joint pain 10/12/2023    Severe aortic stenosis 10/12/2023    Muscular pain 10/12/2023    Personal history of Helicobacter infection 10/12/2023    Pneumonia 10/12/2023    Presence of artificial knee joint 10/12/2023    Right hip pain 10/12/2023    Tubular adenoma 10/12/2023    Stage 4 chronic kidney disease (Multi) 10/18/2023    Paroxysmal atrial fibrillation (Multi) 02/15/2024    Prostate cancer (Multi) 11/09/2017    Squamous cell cancer of skin of left cheek 11/13/2017    Secondary hyperparathyroidism of renal origin (Multi) 04/23/2024    RBBB 02/27/2025     Resolved Ambulatory Problems     Diagnosis Date Noted    Obesity, Class I, BMI 30.0-34.9 (see actual BMI) 10/12/2023    COVID-19 12/27/2024    COVID 12/28/2024     No Additional Past Medical History           Active Orders   Microbiology    Occult Blood, Stool     Frequency: Once     Number of Occurrences: 1 Occurrences   Lab    CBC     Frequency: AM draw     Number of Occurrences: 1 Occurrences    Comprehensive Metabolic Panel     Frequency: AM draw     Number of Occurrences: 1 Occurrences    Protime-INR     Frequency: AM draw     Number of Occurrences: 3 Days   Diet    Adult diet Cardiac, Renal, Consistent Carb; CCD 75 gm/meal; 70 gm fat; 2 - 3 grams Sodium; Potassium Restricted 2 gm (50mEq)     Frequency: Effective now     Number of Occurrences: Until Specified   Nursing    Activity (specify) Ambulate; Ambulate with Assistance; As Tolerated     Frequency: Until discontinued     Number of Occurrences: Until Specified    Apply sequential compression device     Frequency: Until discontinued     Number of Occurrences: Until Specified    Check pulse oximetry     Frequency: q8h     Number of Occurrences: Until  Specified    Glucose 10-70 mg/dL & CONSCIOUS- Give 15 Grams of Carbohydrates and repeat until blood glucose level reaches 100 mg/dL or greater.     Frequency: Until discontinued     Number of Occurrences: Until Specified     Order Comments: Select 1 of the following:  -1 cup skim milk  -3 or 4 glucose tablets  -4 ounces of fruit juice or regular soda.  Patient MUST be CONSCIOUS and able to eat or drink      Notify provider     Frequency: Until discontinued     Number of Occurrences: Until Specified    Notify provider (specify parameters)     Frequency: Until discontinued     Number of Occurrences: Until Specified     Order Comments: [place] Hypoglycemia interventions.      Notify provider (specify parameters)     Frequency: Until discontinued     Number of Occurrences: Until Specified     Order Comments: For blood glucose greater than 200 mg/dL twice over 24 hours.  Provider to consider Endocrine Consult.      Notify provider (specify parameters)     Frequency: Until discontinued     Number of Occurrences: Until Specified    Pain Assessment     Frequency: q4h     Number of Occurrences: Until Specified    Pulse Oximetry     Frequency: Until discontinued     Number of Occurrences: Until Specified    Vital Signs     Frequency: Per unit standards     Number of Occurrences: Until Specified    Vital Signs     Frequency: q4h     Number of Occurrences: Until Specified    Vital signs for transfusion     Frequency: Per unit standards     Number of Occurrences: Until Specified     Order Comments: Document required Vital Signs: Immediately prior to transfusion, within the first 15 minutes of transfusion, 45-60 minutes after infusion starts, and within 1 hour of completion of transfusion.      Vital signs for transfusion     Frequency: Per unit standards     Number of Occurrences: Until Specified     Order Comments: Document required Vital Signs: Immediately prior to transfusion, within the first 15 minutes of transfusion,  45-60 minutes after infusion starts, and within 1 hour of completion of transfusion.      Weight on admission     Frequency: Daily     Number of Occurrences: Until Specified   Code Status    DNR and No Intubation     Frequency: Continuous     Number of Occurrences: Until Specified   Consult    Inpatient consult to Social Work and TCC     Frequency: Once     Number of Occurrences: 1 Occurrences   Nourishments    May Participate in Room Service     Frequency: Once     Number of Occurrences: 1 Occurrences   OT    OT eval and treat     Frequency: Until therapy completed     Number of Occurrences: 1 Occurrences   PT    PT eval and treat     Frequency: Until therapy completed     Number of Occurrences: 1 Occurrences   Respiratory Care    Airway Clearance Techniques Device/modality: EzPap     Frequency: TID     Number of Occurrences: Until Specified    Respiratory care eval and treat     Frequency: Once     Number of Occurrences: 1 Occurrences     Order Comments: Triage 5  Due 5/6 0700     ECG    ECG 12 lead     Frequency: Once     Number of Occurrences: 1 Occurrences    Electrocardiogram, 12-lead PRN ACS symptoms     Frequency: PRN     Number of Occurrences: Until Specified     Order Comments: Notify provider if performed.     Precaution    Fall precautions     Frequency: Until discontinued     Number of Occurrences: Until Specified   Point of Care Testing - Docked Device    POCT Glucose     Frequency: 4x daily - AC and at bedtime     Number of Occurrences: 3 Days     Order Comments: And PRN        POCT Glucose     Frequency: PRN     Number of Occurrences: Until Specified     Order Comments: PRN for hypoglycemia interventions instituted.  Retest blood glucose level every 15 minutes after every hypoglycemic intervention until blood glucose is greater than 100 mg/dL.       Telemetry    Telemetry monitoring - Floor only     Frequency: Until discontinued     Number of Occurrences: 3 Days   Medications    acetaminophen  (Tylenol) oral liquid 650 mg     Linked Order: Or     Frequency: q4h PRN     Dose: 650 mg     Route: oral    acetaminophen (Tylenol) suppository 650 mg     Linked Order: Or     Frequency: q4h PRN     Dose: 650 mg     Route: rectal    acetaminophen (Tylenol) tablet 650 mg     Linked Order: Or     Frequency: q4h PRN     Dose: 650 mg     Route: oral    atorvastatin (Lipitor) tablet 10 mg     Frequency: Nightly     Dose: 10 mg     Route: oral    azithromycin (Zithromax) 500 mg in dextrose 5% 250 mL IV     Frequency: q24h     Dose: 500 mg     Route: intravenous    brimonidine (AlphaGAN) 0.2 % ophthalmic solution 1 drop     Frequency: BID     Dose: 1 drop     Route: Both Eyes    cefTRIAXone (Rocephin) 2 g in dextrose (iso) IV 50 mL     Frequency: q24h     Dose: 2 g     Route: intravenous    cholecalciferol (Vitamin D-3) tablet 25 mcg     Frequency: Daily     Dose: 25 mcg     Route: oral    dextrose 50 % injection 12.5 g     Frequency: q15 min PRN     Dose: 12.5 g     Route: intravenous    dextrose 50 % injection 25 g     Frequency: q15 min PRN     Dose: 25 g     Route: intravenous    docusate sodium (Colace) capsule 100 mg     Frequency: Nightly     Dose: 100 mg     Route: oral    ferrous sulfate tablet 325 mg     Frequency: Nightly     Dose: 1 tablet     Route: oral    glucagon (Glucagen) injection 1 mg     Frequency: q15 min PRN     Dose: 1 mg     Route: intramuscular    glucagon (Glucagen) injection 1 mg     Frequency: q15 min PRN     Dose: 1 mg     Route: intramuscular    guaiFENesin (Mucinex) 12 hr tablet 600 mg     Frequency: BID     Dose: 600 mg     Route: oral    insulin glargine (Lantus) injection 10 Units     Frequency: Nightly     Dose: 10 Units     Route: subcutaneous    insulin lispro injection 0-10 Units     Frequency: TID AC     Dose: 0-10 Units     Route: subcutaneous    ipratropium (Atrovent) 0.02 % nebulizer solution 0.5 mg     Frequency: TID     Dose: 0.5 mg     Route: nebulization     ipratropium-albuteroL (Duo-Neb) 0.5-2.5 mg/3 mL nebulizer solution 3 mL     Frequency: q2h PRN     Dose: 3 mL     Route: nebulization    levothyroxine (Synthroid, Levoxyl) tablet 100 mcg     Frequency: Daily     Dose: 100 mcg     Route: oral    melatonin tablet 5 mg     Frequency: Nightly PRN     Dose: 5 mg     Route: oral    oxygen (O2) therapy     Frequency: Continuous PRN - O2/gases     Route: inhalation    pantoprazole (Protonix) injection 40 mg     Frequency: Daily     Dose: 40 mg     Route: intravenous    torsemide (Demadex) tablet 20 mg     Frequency: Daily     Dose: 20 mg     Route: oral    traZODone (Desyrel) tablet 25 mg     Frequency: Nightly     Dose: 25 mg     Route: oral    warfarin (Coumadin) tablet 2.5 mg     Frequency: Daily     Dose: 2.5 mg     Route: oral     Order Comments: Ensure proper admin timing per warfarin policy.     Dietary Orders (From admission, onward)       Start     Ordered    05/03/25 1658  May Participate in Room Service  ( ROOM SERVICE MAY PARTICIPATE)  Once        Question:  .  Answer:  Yes    05/03/25 1657 05/03/25 1645  Adult diet Cardiac, Renal, Consistent Carb; CCD 75 gm/meal; 70 gm fat; 2 - 3 grams Sodium; Potassium Restricted 2 gm (50mEq)  Diet effective now        Question Answer Comment   Diet type Cardiac    Diet type Renal    Diet type Consistent Carb    Carb diet selection: CCD 75 gm/meal    Fat restriction: 70 gm fat    Sodium restriction: 2 - 3 grams Sodium    Potassium restriction: Potassium Restricted 2 gm (50mEq)        05/03/25 1655                  89 yrs@  ADMITDTTM@  Dark stools [R19.5]  Elevated protime [R79.1]  Anemia, unspecified type [D64.9]  On warfarin for atrial fibrillation (Multi) [I48.91, Z79.01]  [unfilled]  Weight: 81.6 kg (180 lb)     Vitals:    05/02/25 2100 05/03/25 1330 05/03/25 1341 05/03/25 1345   BP: 118/85 111/72 111/72    Pulse: 82 86 88 87   Resp: 16  18 (!) 25   Temp: 36.5 °C (97.7 °F)  36.4 °C (97.5 °F)    TempSrc: Temporal   "Temporal    SpO2: 97% 94% (!) 92% 96%   Weight:   81.6 kg (180 lb)    Height:   1.753 m (5' 9\")     05/03/25 1400 05/03/25 1415 05/03/25 1430 05/03/25 1445   BP:   133/58    Pulse: 88 84 79 80   Resp: (!) 22 20 19 19   Temp:       TempSrc:       SpO2: 96% 98% 95% 95%   Weight:       Height:        05/03/25 1500 05/03/25 1515 05/03/25 1530 05/03/25 1545   BP: 115/57      Pulse: 81 87 88 85   Resp: 20 (!) 24 (!) 23 (!) 25   Temp:       TempSrc:       SpO2: 97% 97% 97% 96%   Weight:       Height:        05/03/25 1600 05/03/25 1642 05/03/25 1645 05/03/25 1826   BP:  147/67 147/67 125/60   Pulse: 92 88 88 77   Resp: (!) 33 16  18   Temp:  36.4 °C (97.5 °F) 36.4 °C (97.5 °F) 36.3 °C (97.3 °F)   TempSrc:  Temporal  Temporal   SpO2: 96%  95% 94%   Weight:  81.6 kg (180 lb)     Height:  1.753 m (5' 9.02\")      05/03/25 1841 05/03/25 1926 05/03/25 2041 05/04/25 0042   BP: 126/60 118/85 118/85 122/58   Pulse: 98  Comment: sitting upright for dinner 82 82 71   Resp: 18 16 16 16   Temp: 36.3 °C (97.3 °F) 36.5 °C (97.7 °F) 36.5 °C (97.7 °F) 35.8 °C (96.4 °F)   TempSrc: Temporal Temporal Temporal Temporal   SpO2: 94% 97% 97% 93%   Weight:       Height:        05/04/25 0056 05/04/25 0142 05/04/25 0345 05/04/25 0402   BP: (!) 111/6 109/59 124/60 124/60   Pulse: 73 78 72 72   Resp: 18 18 16 16   Temp: 36.3 °C (97.3 °F) 35.5 °C (95.9 °F) 36 °C (96.8 °F) 36 °C (96.8 °F)   TempSrc: Temporal Temporal Temporal Temporal   SpO2: 97% 96% 93% 93%   Weight:       Height:        05/04/25 0637 05/04/25 1058 05/04/25 1155   BP: 131/63 108/56    Pulse: 72 72    Resp: 16     Temp: 36.2 °C (97.2 °F) 36 °C (96.8 °F)    TempSrc: Temporal     SpO2: 95% 94% 95%   Weight:      Height:               Chief Complaint    Weakness, Gen         Patient seen resting in bed with head of bed elevated, no s/s or c/o acute difficulties at this time.  Vital signs for last 24 hours Temp:  [35.5 °C (95.9 °F)-36.5 °C (97.7 °F)] 36 °C (96.8 °F)  Heart Rate:  [71-98] " 72  Resp:  [16-33] 16  BP: (108-147)/(6-85) 108/56    No intake/output data recorded.  Patient still requiring frequent cardiac and SPO2 monitoring. Continue aggressive pulmonary hygiene and oral hygiene. Off loading as tolerated for skin integrity. Medications and labs reviewed-    Patient recently received an antibiotic (last 12 hours)       None            Results for orders placed or performed during the hospital encounter of 05/03/25 (from the past 96 hours)   CBC and Auto Differential   Result Value Ref Range    WBC 10.0 4.4 - 11.3 x10*3/uL    nRBC 0.2 (H) 0.0 - 0.0 /100 WBCs    RBC 2.03 (L) 4.50 - 5.90 x10*6/uL    Hemoglobin 6.3 (LL) 13.5 - 17.5 g/dL    Hematocrit 20.8 (L) 41.0 - 52.0 %     (H) 80 - 100 fL    MCH 31.0 26.0 - 34.0 pg    MCHC 30.3 (L) 32.0 - 36.0 g/dL    RDW 15.3 (H) 11.5 - 14.5 %    Platelets 241 150 - 450 x10*3/uL    Neutrophils % 78.6 40.0 - 80.0 %    Immature Granulocytes %, Automated 0.7 0.0 - 0.9 %    Lymphocytes % 11.9 13.0 - 44.0 %    Monocytes % 6.2 2.0 - 10.0 %    Eosinophils % 2.2 0.0 - 6.0 %    Basophils % 0.4 0.0 - 2.0 %    Neutrophils Absolute 7.89 (H) 1.60 - 5.50 x10*3/uL    Immature Granulocytes Absolute, Automated 0.07 0.00 - 0.50 x10*3/uL    Lymphocytes Absolute 1.19 0.80 - 3.00 x10*3/uL    Monocytes Absolute 0.62 0.05 - 0.80 x10*3/uL    Eosinophils Absolute 0.22 0.00 - 0.40 x10*3/uL    Basophils Absolute 0.04 0.00 - 0.10 x10*3/uL   Comprehensive metabolic panel   Result Value Ref Range    Glucose 240 (H) 74 - 99 mg/dL    Sodium 139 136 - 145 mmol/L    Potassium 4.3 3.5 - 5.3 mmol/L    Chloride 103 98 - 107 mmol/L    Bicarbonate 28 21 - 32 mmol/L    Anion Gap 12 10 - 20 mmol/L    Urea Nitrogen 55 (H) 6 - 23 mg/dL    Creatinine 1.60 (H) 0.50 - 1.30 mg/dL    eGFR 40 (L) >60 mL/min/1.73m*2    Calcium 9.6 8.6 - 10.3 mg/dL    Albumin 3.6 3.4 - 5.0 g/dL    Alkaline Phosphatase 65 33 - 136 U/L    Total Protein 6.3 (L) 6.4 - 8.2 g/dL    AST 17 9 - 39 U/L    Bilirubin, Total 0.3  0.0 - 1.2 mg/dL    ALT 20 10 - 52 U/L   Protime-INR   Result Value Ref Range    Protime >100.0 (HH) 9.8 - 12.4 seconds    INR     Troponin I, High Sensitivity   Result Value Ref Range    Troponin I, High Sensitivity 13 0 - 20 ng/L   Type and screen   Result Value Ref Range    ABO TYPE AB     Rh TYPE POS     ANTIBODY SCREEN NEG    Magnesium   Result Value Ref Range    Magnesium 1.93 1.60 - 2.40 mg/dL   Verify ABO/Rh Group Test (VERAB)   Result Value Ref Range    ABO TYPE AB     Rh TYPE POS    Prepare RBC: 2 Units   Result Value Ref Range    PRODUCT CODE H8152F26     Unit Number [id number]     Unit ABO A     Unit RH POS     XM INTEP COMP     Dispense Status TR     Blood Expiration Date 5/23/2025 11:59:00 PM EDT     PRODUCT BLOOD TYPE 6200     UNIT VOLUME 350     PRODUCT CODE N7789R78     Unit Number [id number]     Unit ABO A     Unit RH POS     XM INTEP COMP     Dispense Status IS     Blood Expiration Date 5/23/2025 11:59:00 PM EDT     PRODUCT BLOOD TYPE 6200     UNIT VOLUME 350    Sars-CoV-2, Influenza A/B and RSV PCR   Result Value Ref Range    Coronavirus 2019, PCR Not Detected Not Detected    Flu A Result Not Detected Not Detected    Flu B Result Not Detected Not Detected    RSV PCR Not Detected Not Detected   Legionella Antigen, Urine    Specimen: Clean Catch/Voided; Urine   Result Value Ref Range    L. pneumophila Urine Ag Negative Negative   Streptococcus pneumoniae Antigen, Urine    Specimen: Clean Catch/Voided; Urine   Result Value Ref Range    Streptococcus pneumoniae Ag, Urine Negative Negative   POCT GLUCOSE   Result Value Ref Range    POCT Glucose 279 (H) 74 - 99 mg/dL   POCT GLUCOSE   Result Value Ref Range    POCT Glucose 227 (H) 74 - 99 mg/dL   Hemoglobin and hematocrit, blood   Result Value Ref Range    Hemoglobin 8.8 (L) 13.5 - 17.5 g/dL    Hematocrit 28.0 (L) 41.0 - 52.0 %   CBC   Result Value Ref Range    WBC 8.2 4.4 - 11.3 x10*3/uL    nRBC 0.4 (H) 0.0 - 0.0 /100 WBCs    RBC 2.89 (L) 4.50  - 5.90 x10*6/uL    Hemoglobin 8.8 (L) 13.5 - 17.5 g/dL    Hematocrit 28.0 (L) 41.0 - 52.0 %    MCV 97 80 - 100 fL    MCH 30.4 26.0 - 34.0 pg    MCHC 31.4 (L) 32.0 - 36.0 g/dL    RDW 16.9 (H) 11.5 - 14.5 %    Platelets 211 150 - 450 x10*3/uL   Basic metabolic panel   Result Value Ref Range    Glucose 243 (H) 74 - 99 mg/dL    Sodium 137 136 - 145 mmol/L    Potassium 3.9 3.5 - 5.3 mmol/L    Chloride 99 98 - 107 mmol/L    Bicarbonate 29 21 - 32 mmol/L    Anion Gap 13 10 - 20 mmol/L    Urea Nitrogen 56 (H) 6 - 23 mg/dL    Creatinine 1.67 (H) 0.50 - 1.30 mg/dL    eGFR 38 (L) >60 mL/min/1.73m*2    Calcium 9.1 8.6 - 10.3 mg/dL   Protime-INR   Result Value Ref Range    Protime 16.5 (H) 9.8 - 12.4 seconds    INR 1.5 (H) 0.9 - 1.1   POCT GLUCOSE   Result Value Ref Range    POCT Glucose 227 (H) 74 - 99 mg/dL         Patient fully evaluated 05/03, thorough record review performed of previous labs and notes from prior encounters. Plan discussed with interdisciplinary team, consults placed, appreciate input. Will continue current and repeat labs in the AM.        Discharge planning discussed with patient and care team. Therapy evaluations ordered. Patient aware and agreeable to current plan, continue plan as above.  Pulmonology consultation obtained, appreciate input.    I spent a total of 75 minutes on the date of the service which included preparing to see the patient, face-to-face patient care, completing clinical documentation, obtaining and/or reviewing separately obtained history, performing a medically appropriate examination, counseling and educating the patient/family/caregiver, ordering medications, tests, or procedures, communicating with other HCPs (not separately reported), independently interpreting results (not separately reported), communicating results to the patient/family/caregiver, and care coordination (not separately reported).       Ngozi Lea         [1]   Past Medical History:  Diagnosis Date    Diabetes  mellitus (Multi)    [2] No past surgical history on file.  [3]   Family History  Problem Relation Name Age of Onset    No Known Problems Mother      No Known Problems Father

## 2025-05-04 NOTE — PROGRESS NOTES
Occupational Therapy    Evaluation    Patient Name: Saul Asif  MRN: [id number]  Department: [facility]  Room: [address] [place] [address]  Today's Date: 5/4/2025  Time Calculation  Start Time: 1206  Stop Time: 1222  Time Calculation (min): 16 min    Assessment  IP OT Assessment  OT Assessment: Pt demonstrates a decline in adl/functional mobility. Recommend  low  intensity OT tx [id number]. Good prognosis for goal attainment  Barriers to Discharge Home: No anticipated barriers (pt lives in assisted living  with staff assistance available)  End of Session Communication: Bedside nurse  End of Session Patient Position: Bed, 3 rail up, Alarm on  Plan:  Treatment Interventions: ADL retraining, Functional transfer training  OT Frequency: 2 times per week  OT Discharge Recommendations: Low intensity level of continued care  OT - OK to Discharge: Yes    Subjective   Current Problem:  1. Anemia, unspecified type        2. On warfarin for atrial fibrillation (Multi)        3. Dark stools        4. Elevated protime        5. Pneumonia of both lower lobes due to infectious organism          General:  General  Reason for Referral: OT eval/tx/ impaired functional daily living skills  Referred By: Dylon  Past Medical History Relevant to Rehab: Afib, DM, HLD, hypothyroidism, prostate Ca  Family/Caregiver Present: Yes  Caregiver Feedback: dtr and granddtr present  Co-Treatment: PT  Co-Treatment Reason: maximize pt safety  Prior to Session Communication: Bedside nurse  Patient Position Received: Bed, 2 rail up  General Comment: Pt agreeable to OT tx intervention  Precautions:  Precautions Comment: fall,[place]     Date/Time Vitals Session Patient Position Pulse Resp SpO2 BP MAP (mmHg)    05/04/25 18:48:16 --  --  76  --  93 %  127/58  84     05/04/25 1856 --  --  --  --  94 %  --  --              Pain:  Pain Assessment  Pain Assessment: 0-10  0-10 (Numeric) Pain Score: 0 - No pain    Objective   Cognition:  Overall Cognitive Status: Within  Functional Limits (Pleasant, following commands, but needing safety cues/reminders throughout session. Pt up w/o calling for assist, not wearing footwear despite weakness and recent low labs.)           Home Living:  Home Living Comments: Pt has been at [facility] since ~January per family. Indep /c ADLs, facility for IADLs. Family for transportation. Pt has access to WW, but mostly using cane prior. Denies falls.   Prior Function:   Completed by Assisted living staff  IADL History: Assisted living staff     ADL:pta indep with adl without ADL equip     Activity Tolerance:  Endurance: Tolerates 10 - 20 min exercise with multiple rests  Bed Mobility/Transfers: Bed Mobility  Bed Mobility: Yes  Bed Mobility 1  Bed Mobility Comments 1: Supine<>Sit /c SBA    Transfers  Transfer: Yes  Transfer 1  Trials/Comments 1: Sit<>Stand /c CGA      Functional Mobility:   Sba/cga without device bathroom distances, impulsive does not wait for assistance       IADL's: performed by assisted living staff     Vision: Vision - Basic Assessment  Current Vision: No visual deficits  Sensation:  Light Touch: No apparent deficits  Sensation Comment: denies n/t  Strength:  Strength Comments: strength below elbow wfl  Perception:   wfl  Coordination:    wfl  Hand Function:  Hand Function  Gross Grasp: Functional  Extremities: RUE   RUE : Within Functional Limits and LUE   LUE: Within Functional Limits    Outcome Measures: [facility] Daily Activity  Putting on and taking off regular lower body clothing: A little  Bathing (including washing, rinsing, drying): A little  Putting on and taking off regular upper body clothing: A little  Toileting, which includes using toilet, bedpan or urinal: A little  Taking care of personal grooming such as brushing teeth: A little (sink level)  Eating Meals: None  Daily Activity - Total Score: 19      Education Documentation  Mobility Training, taught by Ceci Monaco, OT at 5/4/2025  6:42  PM.  Learner: Patient  Readiness: Acceptance  Method: Demonstration  Response: Demonstrated Understanding, Needs Reinforcement    Education Comments  No comments found.      Goals:   Encounter Problems       Encounter Problems (Active)       impaired functional  living skills       Pt will increase Grooming to s/mod indep   Upper Body Bathing to s/mod indep     Lower Body Bathing  to s/mod indep    Increase Upper Body Dressing  to s/mod indep    LE Dressing to s/mod indep with/ without adaptive equipment as needed    (Progressing)   Start:  05/04/25    Expected End:  05/18/25            Pt will increase Functional Transfers Bed Mobility to mod indep    Sit to Stand  to s/mod indep   Functional Mobility  with /without a device to s/mod indep  chair/toliet/room to increase indep/safety in patients discharge environment    (Progressing)   Start:  05/04/25    Expected End:  05/18/25

## 2025-05-04 NOTE — CARE PLAN
The patient's goals for the shift include rest    The clinical goals for the shift include patient will remain injury free    Over the shift, the patient did not make progress toward the following goals. Barriers to progression include calling for assistance. Recommendations to address these barriers include frequent rounds.

## 2025-05-04 NOTE — CONSULTS
Pulmonary Critical Care consult  Patient Name :Saul Asif  Date of service : 05/04/25  MRN: [id number]  YOB: 1933      Reason for consultation  Cough/recent COVID infection    History of Present Illness:      89 y.o. male  on day  0 of admission presenting with Anemia, unspecified type.  Patient was transferred from nursing facility due to increased sugar fatigue, had a recent COVID infection at the beginning of the year, last chest x-ray available for comparison was in 2024, his x-ray done during this admission showed bibasilar 8 atypical infiltrate,, workup in the ED revealed anemia, he was 92% on room air but does have moist cough, white count is normal at 10        Past Medical/Surgical History:    has a past medical history of Diabetes mellitus (Multi).   has no past surgical history on file.   reports that he has quit smoking. His smoking use included cigarettes. He has never used smokeless tobacco. No history on file for alcohol use and drug use.  Family History:   No relevant family history has been documented for this patient.    Allergies:     Patient has no known allergies.      Social history:   reports that he has quit smoking. His smoking use included cigarettes. He has never used smokeless tobacco.      Review of Systems:   General: denies weight gain, denies loss of appetite, fever, chills, night sweats.  HEENT: denies headaches, dizziness, head trauma, visual changes, eye pain, tinnitus, nosebleeds, hoarseness or throat pain    Respiratory: denies chest pain, dyspnea, cough and hemoptysis  Cardiovascular: denies orthopnea, paroxysmal nocturnal dyspnea, leg swelling, and previous heart attack.    Gastrointestinal: denies pain, nausea vomiting, diarrhea, constipation, melena or bleeding.  Genitourinary: denies hematuria, frequency, urgency or dysuria  Neurology: denies syncope, seizures, paralysis, paraesthesia   Endocrine: denies polyuria, polydipsia, skin or hair changes, and heat or  cold intolerance  Musculoskeletal: denies joint pain, swelling, arthritis or myalgia  Hematologic: denies bleeding, adenopathy and easy bruising  Skin: denies rashes and skin discoloration  Psychiatry: denies depression    Physical Exam:   Vital Signs:   Vitals:    05/04/25 1058   BP: 108/56   Pulse: 72   Resp:    Temp: 36 °C (96.8 °F)   SpO2: 94%     Vitals:    05/03/25 1642   Weight: 81.6 kg (180 lb)         Input/Output:    Intake/Output Summary (Last 24 hours) at 5/4/2025 1129  Last data filed at 5/4/2025 0345  Gross per 24 hour   Intake 982.17 ml   Output 550 ml   Net 432.17 ml       Oxygen requirements:    Ventilator Information:     Oxygen Therapy/Pulse Ox  Medical Gas Therapy: None (Room air)  SpO2: 94 %  Patient Activity During SpO2 Measurement: At rest  Temp: 36 °C (96.8 °F)        General appearance: Not in pain or distress, in no respiratory distress    HEENT: Atraumatic/normocephalic, EOMI, SABRA, pharynx clear, moist mucosa, redness of the uvula appreciated,   Neck: Supple, no jugular venous distension, lymphadenopathy, thyromegaly or carotid bruits  Chest: Rhonchi  Cardiovascular: Normal S1, S2, regular rate and rhythm, no murmur, rub or gallop  Abdomen: Normal sounds present, soft, lax with no tenderness, no hepatosplenomegaly, and no masses  Extremities: No edema. Pulses are equally present.   Skin: intact, no rashes   Neurologic: Alert and oriented x 3, No focal deficit         Current Medications:   Scheduled medications  Scheduled Medications[1]  Continuous medications  Continuous Medications[2]  PRN medications  PRN Medications[3]      Investigations:  Labs, radiological imaging and cardiac work up were personally reviewed    Results from last 7 days   Lab Units 05/04/25  0611 05/03/25  1354 05/03/25  1043   SODIUM mmol/L 137 139 141   POTASSIUM mmol/L 3.9 4.3 4.6   CHLORIDE mmol/L 99 103 105   CO2 mmol/L 29 28 28   BUN mg/dL 56* 55* 54*   CREATININE mg/dL 1.67* 1.60* 1.62*   GLUCOSE mg/dL 243*  240* 280*   CALCIUM mg/dL 9.1 9.6 8.7     Results from last 7 days   Lab Units 05/04/25  0611   WBC AUTO x10*3/uL 8.2   HEMOGLOBIN g/dL 8.8*  8.8*   HEMATOCRIT % 28.0*  28.0*   PLATELETS AUTO x10*3/uL 211         Imaging  XR chest 1 view  Result Date: 5/3/2025  1. New scattered bilateral lower lobe predominant nodular infiltrates could be infectious. Finding could be further assessed by dedicated chest CT scan.   Signed by: Cleve Cardenas 5/3/2025 2:11 PM Dictation workstation:   [id number]      Cardiology, Vascular, and Other Imaging  No other imaging results found for the past 7 days      Assessment  Saul Asif IS 89 y.o. male  on day  0 of admission presenting with Anemia, unspecified type. Actively being treated for the  following medical Problems:    Bibasilar pulm infiltrates, no fever or leukocytosis, recent COVID infection  Possible community-acquired pneumonia versus remanence of recent pneumonia on the chest x-ray  Anemia with hemoglobin less than 7  Multifactorial shortness of breath  CKD  A-fib on anticoagulation  Aortic stenosis    Recommendation:  Patient was initiated on IV Rocephin and azithromycin  Possible de-escalation if cough and shortness of breath improved and patient did not show any signs of fever or leukocytosis in the next 24 hours  Coumadin was reversed with vitamin K, transfuse 1 unit of blood, follow-up H&H posttransfusion  IV Protonix initiated stool occult is pending  May require DDAVP if patient was noted to have active GI bleed  Viral panels were negative  Patient is not requiring oxygen, oxygen for saturation of 89 to 94%  Incentive spirometry  Bronchodilators therapy as needed  PT/OT  DVT prophylaxis  Minimize benzodiazepine and narcotics  Encourage ambulation  Head evaluation and aspiration precautions.  Ipratropium every 8 hours      Mateo Frye MD  Pulmonary critical care consultant  05/04/25  11:29 AM       STAFF PHYSICIAN NOTE OF PERSONAL INVOLVEMENT IN CARE  As the  attending physician, I certify that I personally reviewed the patient's history and personally examined the patient to confirm the physical findings described above, and that I reviewed the relevant imaging studies and available reports.  I also discussed the differential diagnosis and all of the proposed management plans with the patient and individuals accompanying the patient to this visit.  They had the opportunity to ask questions about the proposed management plans and to have those questions answered.           [1] atorvastatin, 10 mg, oral, Nightly  azithromycin, 500 mg, intravenous, q24h  brimonidine, 1 drop, Both Eyes, BID  cefTRIAXone, 2 g, intravenous, q24h  cholecalciferol, 25 mcg, oral, Daily  docusate sodium, 100 mg, oral, Nightly  ferrous sulfate, 1 tablet, oral, Nightly  insulin glargine, 10 Units, subcutaneous, Nightly  insulin lispro, 0-10 Units, subcutaneous, TID AC  levothyroxine, 100 mcg, oral, Daily  pantoprazole, 40 mg, intravenous, Daily  torsemide, 20 mg, oral, Daily  traZODone, 25 mg, oral, Nightly  [Held by provider] warfarin, 2.5 mg, oral, Daily  [2]    [3] PRN medications: acetaminophen **OR** acetaminophen **OR** acetaminophen, dextrose, dextrose, glucagon, glucagon, guaiFENesin, ipratropium-albuteroL, melatonin, oxygen

## 2025-05-04 NOTE — PROGRESS NOTES
Physical Therapy    Physical Therapy Evaluation    Patient Name: Saul Asif  MRN: [id number]  Today's Date: 5/4/2025   Time Calculation  Start Time: 1204  Stop Time: 1222  Time Calculation (min): 18 min  328/328-A    Assessment/Plan   PT Assessment  PT Assessment Results: Decreased strength, Decreased endurance, Impaired balance, Decreased mobility, Decreased safety awareness  Rehab Prognosis: Good  Barriers to Discharge Home: No anticipated barriers  Evaluation/Treatment Tolerance: Patient tolerated treatment well  Medical Staff Made Aware: Yes  End of Session Communication: Bedside nurse  Assessment Comment: Pt presents /c above impairments and decline from functional baseline 2nd acute medical conditions. Pt will benefit from continued PT services at low intensity /c 24hr supervision to address above and maximize functional mobility.  End of Session Patient Position: Bed, 3 rail up, Alarm on  IP OR SWING BED PT PLAN  Inpatient or Swing Bed: Inpatient  PT Plan  Treatment/Interventions: Bed mobility, Transfer training, Gait training, Balance training, Neuromuscular re-education, Strengthening, Endurance training, Therapeutic exercise, Therapeutic activity  PT Plan: Ongoing PT  PT Frequency: 3 times per week  PT Discharge Recommendations: Low intensity level of continued care  PT Recommended Transfer Status: Stand by assist  PT - OK to Discharge: Yes    Subjective     Current Problem:  1. Anemia, unspecified type        2. On warfarin for atrial fibrillation (Multi)        3. Dark stools        4. Elevated protime        5. Pneumonia of both lower lobes due to infectious organism          Problem List[1]    General Visit Information:  General  Reason for Referral: PT eval and treat  Referred By: Dylon  Past Medical History Relevant to Rehab: Afib, DM, HLD, hypothyroidism, prostate Ca  Family/Caregiver Present: Yes  Caregiver Feedback: dtr and granddtr present  Co-Treatment: OT  Co-Treatment Reason: possible two  person assist, maximize functional mobility  Prior to Session Communication: Bedside nurse  Patient Position Received: Up in bathroom  General Comment: Pt presents from [facility] /c c/o weakness, fatigue, hgb 6.3 and dark stools. FOBT pending. Supratherapeutic INR. Hgb 8.8 s/p transfusion. Pt cleared for therapy by nursing. Pt exiting BR upon arrival, agreeable.    Home Living:  Home Living  Home Living Comments: Pt has been at [facility] since ~January per family. Indep /c ADLs, facility for IADLs. Family for transportation. Pt has access to WW, but mostly using cane prior. Denies falls.      Precautions:  Precautions  Precautions Comment: falls, safety    Objective     Pain:  Pain Assessment  Pain Assessment: 0-10  0-10 (Numeric) Pain Score: 0 - No pain    Cognition:  Cognition  Overall Cognitive Status: Within Functional Limits (pleasant, following commands, but needing safety cues/reminders throughout session. Pt up /s calling for assist, not wearing footwear despite weakness and recent low labs.)    General Assessments:  General Observation  General Observation: activity orders: ambulate /c A  skin integrity: WFL   Activity Tolerance  Endurance: Tolerates 10 - 20 min exercise with multiple rests  Sensation  Sensation Comment: denies n/t  Strength  Strength Comments: BLE 4/5           Dynamic Sitting Balance  Dynamic Sitting-Comments: G  Dynamic Standing Balance  Dynamic Standing-Comments: F+    Functional Assessments:     Bed Mobility  Bed Mobility: Yes  Bed Mobility 1  Bed Mobility Comments 1: Supine<>Sit /c SBA  Transfers  Transfer: Yes  Transfer 1  Trials/Comments 1: Sit<>Stand /c CGA  Ambulation/Gait Training  Ambulation/Gait Training Performed:  (Pt ambulates 15' /c personal cane, CGA-SBA. Wide GORDY, step through gait, equal step length. Min instability, but no overt LOB. Pt states not yet feeling back to h is baseline /c mobility. Encouraged use of WW upon initial d/c.)          Outcome Measures:     [facility] Basic  Mobility  Turning from your back to your side while in a flat bed without using bedrails: A little  Moving from lying on your back to sitting on the side of a flat bed without using bedrails: A little  Moving to and from bed to chair (including a wheelchair): A little  Standing up from a chair using your arms (e.g. wheelchair or bedside chair): A little  To walk in hospital room: A little  Climbing 3-5 steps with railing: A lot  Basic Mobility - Total Score: 17                Goals:  Encounter Problems       Encounter Problems (Active)       PT Problem       STG - Pt will transition supine <> sitting with mod I (Progressing)       Start:  05/04/25    Expected End:  05/18/25            STG - Pt will transfer STS with mod I (Progressing)       Start:  05/04/25    Expected End:  05/18/25            STG - Pt will amb >/=150' using LRAD with mod I (Progressing)       Start:  05/04/25    Expected End:  05/18/25            STG - Pt will maintain G standing balance to decrease risk of falls (Progressing)       Start:  05/04/25    Expected End:  05/18/25                 Education Documentation  Mobility Training, taught by Tracy Whyte, PT at 5/4/2025  2:28 PM.  Learner: Family, Patient  Readiness: Acceptance  Method: Explanation  Response: Verbalizes Understanding, Needs Reinforcement  Comment: PT POC    Education Comments  No comments found.               [1]   Patient Active Problem List  Diagnosis    Abdominal bloating    Abdominal pain    Abnormal chest xray    Acquired hypothyroidism    Asymptomatic cholelithiasis    Atrial fibrillation (Multi)    Kraus's esophagus    Diabetes mellitus (Multi)    Gastric ulcer    Helicobacter pylori (H. pylori) infection    HH (hiatus hernia)    History of colon polyps    Hyperlipidemia    Iron deficiency anemia    Irritable bowel syndrome    Leg joint pain    Severe aortic stenosis    Muscular pain    Personal history of Helicobacter infection    Pneumonia    Presence of  artificial knee joint    Right hip pain    Tubular adenoma    Stage 4 chronic kidney disease (Multi)    Paroxysmal atrial fibrillation (Multi)    Prostate cancer (Multi)    Squamous cell cancer of skin of left cheek    Secondary hyperparathyroidism of renal origin (Multi)    RBBB    Anemia, unspecified type    Supratherapeutic INR    Productive cough    Shortness of breath    Generalized weakness

## 2025-05-05 LAB
ALBUMIN SERPL BCP-MCNC: 3.4 G/DL (ref 3.4–5)
ALP SERPL-CCNC: 58 U/L (ref 33–136)
ALT SERPL W P-5'-P-CCNC: 16 U/L (ref 10–52)
ANION GAP SERPL CALC-SCNC: 12 MMOL/L (ref 10–20)
AST SERPL W P-5'-P-CCNC: 16 U/L (ref 9–39)
BASOPHILS # BLD AUTO: 0.04 X10*3/UL (ref 0–0.1)
BASOPHILS NFR BLD AUTO: 0.5 %
BILIRUB SERPL-MCNC: 0.4 MG/DL (ref 0–1.2)
BLOOD EXPIRATION DATE: NORMAL
BLOOD EXPIRATION DATE: NORMAL
BUN SERPL-MCNC: 41 MG/DL (ref 6–23)
CALCIUM SERPL-MCNC: 8.8 MG/DL (ref 8.6–10.3)
CHLORIDE SERPL-SCNC: 97 MMOL/L (ref 98–107)
CO2 SERPL-SCNC: 31 MMOL/L (ref 21–32)
CREAT SERPL-MCNC: 1.62 MG/DL (ref 0.5–1.3)
DISPENSE STATUS: NORMAL
DISPENSE STATUS: NORMAL
EGFRCR SERPLBLD CKD-EPI 2021: 40 ML/MIN/1.73M*2
EOSINOPHIL # BLD AUTO: 0.21 X10*3/UL (ref 0–0.4)
EOSINOPHIL NFR BLD AUTO: 2.5 %
ERYTHROCYTE [DISTWIDTH] IN BLOOD BY AUTOMATED COUNT: 16.9 % (ref 11.5–14.5)
GLUCOSE BLD MANUAL STRIP-MCNC: 209 MG/DL (ref 74–99)
GLUCOSE BLD MANUAL STRIP-MCNC: 259 MG/DL (ref 74–99)
GLUCOSE BLD MANUAL STRIP-MCNC: 356 MG/DL (ref 74–99)
GLUCOSE BLD MANUAL STRIP-MCNC: 375 MG/DL (ref 74–99)
GLUCOSE SERPL-MCNC: 216 MG/DL (ref 74–99)
HCT VFR BLD AUTO: 27.4 % (ref 41–52)
HGB BLD-MCNC: 8.6 G/DL (ref 13.5–17.5)
HOLD SPECIMEN: 293
IMM GRANULOCYTES # BLD AUTO: 0.09 X10*3/UL (ref 0–0.5)
IMM GRANULOCYTES NFR BLD AUTO: 1.1 % (ref 0–0.9)
INR PPP: 1.3 (ref 0.9–1.1)
LYMPHOCYTES # BLD AUTO: 1.13 X10*3/UL (ref 0.8–3)
LYMPHOCYTES NFR BLD AUTO: 13.2 %
MCH RBC QN AUTO: 30.2 PG (ref 26–34)
MCHC RBC AUTO-ENTMCNC: 31.4 G/DL (ref 32–36)
MCV RBC AUTO: 96 FL (ref 80–100)
MONOCYTES # BLD AUTO: 0.76 X10*3/UL (ref 0.05–0.8)
MONOCYTES NFR BLD AUTO: 8.9 %
NEUTROPHILS # BLD AUTO: 6.3 X10*3/UL (ref 1.6–5.5)
NEUTROPHILS NFR BLD AUTO: 73.8 %
NRBC BLD-RTO: 0 /100 WBCS (ref 0–0)
PLATELET # BLD AUTO: 233 X10*3/UL (ref 150–450)
POTASSIUM SERPL-SCNC: 3.7 MMOL/L (ref 3.5–5.3)
PRODUCT BLOOD TYPE: 6200
PRODUCT BLOOD TYPE: 6200
PRODUCT CODE: NORMAL
PRODUCT CODE: NORMAL
PROT SERPL-MCNC: 5.9 G/DL (ref 6.4–8.2)
PROTHROMBIN TIME: 14.6 SECONDS (ref 9.8–12.4)
RBC # BLD AUTO: 2.85 X10*6/UL (ref 4.5–5.9)
SODIUM SERPL-SCNC: 136 MMOL/L (ref 136–145)
UNIT ABO: NORMAL
UNIT ABO: NORMAL
UNIT NUMBER: NORMAL
UNIT NUMBER: NORMAL
UNIT RH: NORMAL
UNIT RH: NORMAL
UNIT VOLUME: 350
UNIT VOLUME: 350
WBC # BLD AUTO: 8.5 X10*3/UL (ref 4.4–11.3)
XM INTEP: NORMAL
XM INTEP: NORMAL

## 2025-05-05 PROCEDURE — 82947 ASSAY GLUCOSE BLOOD QUANT: CPT

## 2025-05-05 PROCEDURE — 2500000002 HC RX 250 W HCPCS SELF ADMINISTERED DRUGS (ALT 637 FOR MEDICARE OP, ALT 636 FOR OP/ED): Performed by: INTERNAL MEDICINE

## 2025-05-05 PROCEDURE — 2500000004 HC RX 250 GENERAL PHARMACY W/ HCPCS (ALT 636 FOR OP/ED): Performed by: PHYSICIAN ASSISTANT

## 2025-05-05 PROCEDURE — 2500000002 HC RX 250 W HCPCS SELF ADMINISTERED DRUGS (ALT 637 FOR MEDICARE OP, ALT 636 FOR OP/ED): Performed by: PHYSICIAN ASSISTANT

## 2025-05-05 PROCEDURE — 85025 COMPLETE CBC W/AUTO DIFF WBC: CPT | Performed by: INTERNAL MEDICINE

## 2025-05-05 PROCEDURE — 80053 COMPREHEN METABOLIC PANEL: CPT | Performed by: INTERNAL MEDICINE

## 2025-05-05 PROCEDURE — 2500000004 HC RX 250 GENERAL PHARMACY W/ HCPCS (ALT 636 FOR OP/ED): Performed by: INTERNAL MEDICINE

## 2025-05-05 PROCEDURE — 36415 COLL VENOUS BLD VENIPUNCTURE: CPT | Performed by: INTERNAL MEDICINE

## 2025-05-05 PROCEDURE — 1200000002 HC GENERAL ROOM WITH TELEMETRY DAILY

## 2025-05-05 PROCEDURE — 85610 PROTHROMBIN TIME: CPT | Performed by: PHYSICIAN ASSISTANT

## 2025-05-05 PROCEDURE — 94640 AIRWAY INHALATION TREATMENT: CPT

## 2025-05-05 PROCEDURE — 99222 1ST HOSP IP/OBS MODERATE 55: CPT | Performed by: INTERNAL MEDICINE

## 2025-05-05 PROCEDURE — 2500000001 HC RX 250 WO HCPCS SELF ADMINISTERED DRUGS (ALT 637 FOR MEDICARE OP): Performed by: PHYSICIAN ASSISTANT

## 2025-05-05 RX ORDER — BISACODYL 5 MG
5 TABLET, DELAYED RELEASE (ENTERIC COATED) ORAL DAILY
Status: DISCONTINUED | OUTPATIENT
Start: 2025-05-05 | End: 2025-05-09 | Stop reason: HOSPADM

## 2025-05-05 RX ORDER — POLYETHYLENE GLYCOL 3350 17 G/17G
17 POWDER, FOR SOLUTION ORAL DAILY PRN
Status: DISCONTINUED | OUTPATIENT
Start: 2025-05-05 | End: 2025-05-09 | Stop reason: HOSPADM

## 2025-05-05 RX ADMIN — INSULIN LISPRO 10 UNITS: 100 INJECTION, SOLUTION INTRAVENOUS; SUBCUTANEOUS at 18:09

## 2025-05-05 RX ADMIN — INSULIN LISPRO 4 UNITS: 100 INJECTION, SOLUTION INTRAVENOUS; SUBCUTANEOUS at 08:31

## 2025-05-05 RX ADMIN — Medication 25 MCG: at 08:59

## 2025-05-05 RX ADMIN — GUAIFENESIN 600 MG: 600 TABLET, EXTENDED RELEASE ORAL at 20:32

## 2025-05-05 RX ADMIN — IPRATROPIUM BROMIDE 0.5 MG: 0.5 SOLUTION RESPIRATORY (INHALATION) at 11:33

## 2025-05-05 RX ADMIN — INSULIN GLARGINE 10 UNITS: 100 INJECTION, SOLUTION SUBCUTANEOUS at 20:32

## 2025-05-05 RX ADMIN — PANTOPRAZOLE SODIUM 40 MG: 40 INJECTION, POWDER, FOR SOLUTION INTRAVENOUS at 08:59

## 2025-05-05 RX ADMIN — LEVOTHYROXINE SODIUM 100 MCG: 0.1 TABLET ORAL at 05:43

## 2025-05-05 RX ADMIN — IPRATROPIUM BROMIDE 0.5 MG: 0.5 SOLUTION RESPIRATORY (INHALATION) at 19:55

## 2025-05-05 RX ADMIN — AZITHROMYCIN DIHYDRATE 500 MG: 500 INJECTION, POWDER, LYOPHILIZED, FOR SOLUTION INTRAVENOUS at 17:30

## 2025-05-05 RX ADMIN — TRAZODONE HYDROCHLORIDE 25 MG: 50 TABLET ORAL at 20:32

## 2025-05-05 RX ADMIN — BRIMONIDINE TARTRATE 1 DROP: 2 SOLUTION/ DROPS OPHTHALMIC at 08:59

## 2025-05-05 RX ADMIN — FERROUS SULFATE TAB 325 MG (65 MG ELEMENTAL FE) 325 MG: 325 (65 FE) TAB at 20:32

## 2025-05-05 RX ADMIN — GUAIFENESIN 600 MG: 600 TABLET, EXTENDED RELEASE ORAL at 08:59

## 2025-05-05 RX ADMIN — ATORVASTATIN CALCIUM 10 MG: 10 TABLET, FILM COATED ORAL at 20:32

## 2025-05-05 RX ADMIN — CEFTRIAXONE SODIUM 2 G: 2 INJECTION, SOLUTION INTRAVENOUS at 20:33

## 2025-05-05 RX ADMIN — IPRATROPIUM BROMIDE 0.5 MG: 0.5 SOLUTION RESPIRATORY (INHALATION) at 07:29

## 2025-05-05 RX ADMIN — DOCUSATE SODIUM 100 MG: 100 CAPSULE, LIQUID FILLED ORAL at 20:32

## 2025-05-05 RX ADMIN — POLYETHYLENE GLYCOL 3350 17 G: 17 POWDER, FOR SOLUTION ORAL at 11:54

## 2025-05-05 RX ADMIN — BISACODYL 5 MG: 5 TABLET, COATED ORAL at 11:54

## 2025-05-05 RX ADMIN — BRIMONIDINE TARTRATE 1 DROP: 2 SOLUTION/ DROPS OPHTHALMIC at 20:33

## 2025-05-05 RX ADMIN — TORSEMIDE 20 MG: 20 TABLET ORAL at 08:59

## 2025-05-05 ASSESSMENT — COGNITIVE AND FUNCTIONAL STATUS - GENERAL
STANDING UP FROM CHAIR USING ARMS: A LITTLE
WALKING IN HOSPITAL ROOM: A LITTLE
TURNING FROM BACK TO SIDE WHILE IN FLAT BAD: A LITTLE
PERSONAL GROOMING: A LITTLE
DAILY ACTIVITIY SCORE: 19
DRESSING REGULAR UPPER BODY CLOTHING: A LITTLE
STANDING UP FROM CHAIR USING ARMS: A LITTLE
DRESSING REGULAR LOWER BODY CLOTHING: A LITTLE
PERSONAL GROOMING: A LITTLE
MOBILITY SCORE: 18
CLIMB 3 TO 5 STEPS WITH RAILING: A LOT
MOVING FROM LYING ON BACK TO SITTING ON SIDE OF FLAT BED WITH BEDRAILS: A LITTLE
TOILETING: A LITTLE
DAILY ACTIVITIY SCORE: 19
HELP NEEDED FOR BATHING: A LITTLE
MOBILITY SCORE: 17
TOILETING: A LITTLE
HELP NEEDED FOR BATHING: A LITTLE
MOVING TO AND FROM BED TO CHAIR: A LITTLE
CLIMB 3 TO 5 STEPS WITH RAILING: A LOT
DRESSING REGULAR UPPER BODY CLOTHING: A LITTLE
DRESSING REGULAR LOWER BODY CLOTHING: A LITTLE
MOVING TO AND FROM BED TO CHAIR: A LITTLE
MOVING FROM LYING ON BACK TO SITTING ON SIDE OF FLAT BED WITH BEDRAILS: A LITTLE
TURNING FROM BACK TO SIDE WHILE IN FLAT BAD: A LITTLE

## 2025-05-05 ASSESSMENT — PAIN - FUNCTIONAL ASSESSMENT: PAIN_FUNCTIONAL_ASSESSMENT: 0-10

## 2025-05-05 ASSESSMENT — PAIN SCALES - GENERAL
PAINLEVEL_OUTOF10: 0 - NO PAIN

## 2025-05-05 NOTE — CARE PLAN
The patient's goals for the shift include rest    The clinical goals for the shift include Pt will remain safe and free from injury throughout shift    Over the shift, the patient did not make progress toward the following goals. Barriers to progression include acute illness. Recommendations to address these barriers include communication.

## 2025-05-05 NOTE — CONSULTS
Inpatient consult to Gastroenterology  Consult performed by: Silverio Melendez MD  Consult ordered by: Sameer Weaver MD  Reason for consult: GI bleed      Reason For Consult  GI bleed    History Of Present Illness  Saul Asif is a 89 y.o. male with a past medical history of A-fib on Coumadin, CKD, hypothyroidism, AAS, type 2 diabetes who presented on 5/3 for low hemoglobin of 6.3.  Per ED staff, patient had reported extreme fatigue over the 2 days prior to his admission.  This was associated with dark stools but he denied any renetta blood.  Per family, patient was also having increased difficulty breathing and a productive cough over the last week or so.  He did have a chest x-ray at his facility which was unremarkable.  The patient states that he has had no abdominal discomfort over this time.  Does admit to feeling more tired than usual over the past couple weeks.  States that his black stools started a couple of weeks ago but he has never noticed any renetta blood in the toilet or on toilet paper.  Has never had any episodes of GI bleeds in the past.  Has had colonoscopies before with only polyps discovered in the past.  Has not had a bowel movement since being in the hospital, in 2 or 3 days.  He is unaware who was keeping track of his INR at his facility, does not remember when his last INR check was prior to hospitalization.    ED course: Vitals unremarkable on arrival, glucose 240, electrolytes were within normal limits, creatinine slightly elevated at 1.6.  His PT was greater than 100 and INR was unable to be calculated.  CBC significant for hemoglobin of 6.3, WBC 10, platelets 241.  Chest x-ray showed scattered bilateral lower lobe infiltrates.  He was given vitamin K and transfused 1 unit of blood and started on IV PPI.     Past Medical History  He has a past medical history of Diabetes mellitus (Multi).    Surgical History  He has no past surgical history on file.     Social History  He reports that  he has quit smoking. His smoking use included cigarettes. He has never used smokeless tobacco. No history on file for alcohol use and drug use.    Family History  Family History[1]     Allergies  Patient has no known allergies.    Review of Systems  Negative unless stated above     Physical Exam  Physical Exam  Vitals and nursing note reviewed.   Constitutional:       Appearance: Normal appearance.   Cardiovascular:      Rate and Rhythm: Normal rate.   Pulmonary:      Effort: Pulmonary effort is normal.      Breath sounds: Normal breath sounds.   Abdominal:      General: Abdomen is flat. There is no distension.      Palpations: Abdomen is soft.      Tenderness: There is no abdominal tenderness.   Musculoskeletal:      Right lower leg: No edema.      Left lower leg: No edema.   Skin:     General: Skin is warm and dry.      Coloration: Skin is not jaundiced.   Neurological:      General: No focal deficit present.      Mental Status: He is alert and oriented to person, place, and time.       Last Recorded Vitals  /55 (BP Location: Right arm, Patient Position: Lying)   Pulse 81   Temp 36.2 °C (97.2 °F) (Temporal)   Resp 16   Wt 81.6 kg (180 lb)   SpO2 96%        Assessment/Plan   Saul Asif is a 89 y.o. male with a past medical history of A-fib on Coumadin, CKD, hypothyroidism, AAS, type 2 diabetes who presented on 5/3 for low hemoglobin of 6.3.  GI consulted for GI bleed.    Acute on chronic anemia  -Suspect this was exacerbated by his supratherapeutic INR  -Presented with increased fatigue, hemoglobin 6.3  -Continue holding Coumadin, continue IV PPI, monitor Hgb and transfuse as necessary  CKD  Supratherapeutic INR.   On Chronic AC.     Silverio Melendez MD  PGY-1, Internal Medicine    Saw and examined the patient, acute drop in H/H with melena, no other GI symptoms, supratherapeutic INR likely contributed to the bleed. Discussed with him EGD/Colonoscopy risk and benefits or to do nothing at all and  consider it as an out patient. He would like to proceed with EGD/Colonoscopy. Plan for tomorrow.   I saw and evaluated the patient. I personally obtained the key and critical portions of the history and physical exam or was physically present for key and critical portions performed by the resident/fellow. I reviewed the resident/fellow's documentation and discussed the patient with the resident/fellow. I agree with the resident/fellow's medical decision making as documented in the note.    Shaka Queen MD         [1]   Family History  Problem Relation Name Age of Onset    No Known Problems Mother      No Known Problems Father

## 2025-05-05 NOTE — PROGRESS NOTES
Subjective   Saul Asif is a 89 y.o. male on day 1 of admission presenting with Anemia, unspecified type.          Sameer Weaver MD   Physician  Internal Medicine     H&P      Signed     Date of Service: 5/3/2025  5:40 PM     Signed       Expand All Collapse All    History Of Present Illness  Saul Asif is a 89 y.o. male with past medical history significant for atrial fibrillation on Coumadin, CKD, HLD, hypothyroidism, prostate cancer, aortic valve stenosis, and type 2 diabetes mellitus who presents to the ED from facility for evaluation of low hemoglobin level.  Hemoglobin level 6.3 this morning on lab work at facility.  Patient reports extreme fatigue over the past 2 days.  Also notes dark stools however denies noticing any blood in his stools.  Denies any blood in his urine or coughing up any blood.  Has had blood transfusions in the past.  Reports some generalized weakness over the past couple days and generally not feeling very well.  Patient's family also notes shortness of breath and productive cough over the past week, granddaughter stating his breathing sounded horrible last week and when she was visiting him.  States the facility did a chest x-ray at that time which was unremarkable.  Patient still reports congestion, coughing up yellow phlegm and intermittent shortness of breath.  Denies any fevers or chills.  Denies headaches, dizziness, chest pains, abdominal pain or nausea, appetite changes, or urinary changes.  Says he had COVID at the beginning of the year which was very hard on him.     ED course: On arrival to the ED, patient afebrile and hemodynamically stable with SpO2 92% on room air.  Glucose 240, electrolytes WNL, BUN 55/creatinine 1.6, LFTs WNL, troponin 13.  PT greater than 100, unable to calculate INR level.  WBC 10, hemoglobin 6.3/hematocrit 20.8, platelets 241.  Chest x-ray shows new scattered bilateral lower lobe predominant nodular infiltrates could be infectious.     EKG  "(interpreted by ED physician): Sinus rhythm with prolonged FL interval, rate of 85, RBBB, no ST segment depression or elevation consistent with ischemia or infarction.           Past Medical History  Medical History     Surgical History  Surgical History     Social History  He reports that he has quit smoking. His smoking use included cigarettes. He has never used smokeless tobacco. No history on file for alcohol use and drug use.     Family History  Family History     Allergies  Patient has no known allergies.     Review of Systems   All other systems reviewed and are negative.     10 point review system negative except as noted above in HPI     Physical Exam  Vitals and nursing note reviewed.   Constitutional:       General: He is not in acute distress.     Appearance: Normal appearance. He is not toxic-appearing.   HENT:      Head: Normocephalic and atraumatic.      Mouth/Throat:      Pharynx: Oropharynx is clear.   Eyes:      Conjunctiva/sclera: Conjunctivae normal.   Cardiovascular:      Rate and Rhythm: Normal rate and regular rhythm.   Pulmonary:      Effort: Pulmonary effort is normal.      Breath sounds: No wheezing.      Comments: Coarse breath sounds bilaterally.  Currently on room air, SpO2 93%.  Tachypneic.  Abdominal:      General: Bowel sounds are normal. There is no distension.      Palpations: Abdomen is soft.      Tenderness: There is no abdominal tenderness.   Musculoskeletal:         General: Normal range of motion.      Right lower leg: No edema.      Left lower leg: No edema.   Skin:     General: Skin is warm and dry.   Neurological:      Mental Status: He is alert and oriented to person, place, and time.   Psychiatric:         Behavior: Behavior normal.         Last Recorded Vitals  Blood pressure 108/56, pulse 72, temperature 36 °C (96.8 °F), resp. rate 16, height 1.753 m (5' 9.02\"), weight 81.6 kg (180 lb), SpO2 95%.     Relevant Results        Results for orders placed or performed during " the hospital encounter of 05/03/25 (from the past 24 hours)   CBC and Auto Differential   Result Value Ref Range     WBC 10.0 4.4 - 11.3 x10*3/uL     nRBC 0.2 (H) 0.0 - 0.0 /100 WBCs     RBC 2.03 (L) 4.50 - 5.90 x10*6/uL     Hemoglobin 6.3 (LL) 13.5 - 17.5 g/dL     Hematocrit 20.8 (L) 41.0 - 52.0 %      (H) 80 - 100 fL     MCH 31.0 26.0 - 34.0 pg     MCHC 30.3 (L) 32.0 - 36.0 g/dL     RDW 15.3 (H) 11.5 - 14.5 %     Platelets 241 150 - 450 x10*3/uL     Neutrophils % 78.6 40.0 - 80.0 %     Immature Granulocytes %, Automated 0.7 0.0 - 0.9 %     Lymphocytes % 11.9 13.0 - 44.0 %     Monocytes % 6.2 2.0 - 10.0 %     Eosinophils % 2.2 0.0 - 6.0 %     Basophils % 0.4 0.0 - 2.0 %     Neutrophils Absolute 7.89 (H) 1.60 - 5.50 x10*3/uL     Immature Granulocytes Absolute, Automated 0.07 0.00 - 0.50 x10*3/uL     Lymphocytes Absolute 1.19 0.80 - 3.00 x10*3/uL     Monocytes Absolute 0.62 0.05 - 0.80 x10*3/uL     Eosinophils Absolute 0.22 0.00 - 0.40 x10*3/uL     Basophils Absolute 0.04 0.00 - 0.10 x10*3/uL   Comprehensive metabolic panel   Result Value Ref Range     Glucose 240 (H) 74 - 99 mg/dL     Sodium 139 136 - 145 mmol/L     Potassium 4.3 3.5 - 5.3 mmol/L     Chloride 103 98 - 107 mmol/L     Bicarbonate 28 21 - 32 mmol/L     Anion Gap 12 10 - 20 mmol/L     Urea Nitrogen 55 (H) 6 - 23 mg/dL     Creatinine 1.60 (H) 0.50 - 1.30 mg/dL     eGFR 40 (L) >60 mL/min/1.73m*2     Calcium 9.6 8.6 - 10.3 mg/dL     Albumin 3.6 3.4 - 5.0 g/dL     Alkaline Phosphatase 65 33 - 136 U/L     Total Protein 6.3 (L) 6.4 - 8.2 g/dL     AST 17 9 - 39 U/L     Bilirubin, Total 0.3 0.0 - 1.2 mg/dL     ALT 20 10 - 52 U/L   Protime-INR   Result Value Ref Range     Protime >100.0 (HH) 9.8 - 12.4 seconds     INR       Troponin I, High Sensitivity   Result Value Ref Range     Troponin I, High Sensitivity 13 0 - 20 ng/L   Type and screen   Result Value Ref Range     ABO TYPE AB       Rh TYPE POS       ANTIBODY SCREEN NEG     Magnesium   Result Value  Ref Range     Magnesium 1.93 1.60 - 2.40 mg/dL   Verify ABO/Rh Group Test (VERAB)   Result Value Ref Range     ABO TYPE AB       Rh TYPE POS     Prepare RBC: 2 Units   Result Value Ref Range     PRODUCT CODE A0536O37       Unit Number [id number]       Unit ABO A       Unit RH POS       XM INTEP COMP       Dispense Status TR       Blood Expiration Date 5/23/2025 11:59:00 PM EDT       PRODUCT BLOOD TYPE 6200       UNIT VOLUME 350       PRODUCT CODE Y8997Y35       Unit Number [id number]       Unit ABO A       Unit RH POS       XM INTEP COMP       Dispense Status IS       Blood Expiration Date 5/23/2025 11:59:00 PM EDT       PRODUCT BLOOD TYPE 6200       UNIT VOLUME 350     Sars-CoV-2, Influenza A/B and RSV PCR   Result Value Ref Range     Coronavirus 2019, PCR Not Detected Not Detected     Flu A Result Not Detected Not Detected     Flu B Result Not Detected Not Detected     RSV PCR Not Detected Not Detected   Legionella Antigen, Urine     Specimen: Clean Catch/Voided; Urine   Result Value Ref Range     L. pneumophila Urine Ag Negative Negative   Streptococcus pneumoniae Antigen, Urine     Specimen: Clean Catch/Voided; Urine   Result Value Ref Range     Streptococcus pneumoniae Ag, Urine Negative Negative   POCT GLUCOSE   Result Value Ref Range     POCT Glucose 279 (H) 74 - 99 mg/dL   POCT GLUCOSE   Result Value Ref Range     POCT Glucose 227 (H) 74 - 99 mg/dL   Hemoglobin and hematocrit, blood   Result Value Ref Range     Hemoglobin 8.8 (L) 13.5 - 17.5 g/dL     Hematocrit 28.0 (L) 41.0 - 52.0 %   CBC   Result Value Ref Range     WBC 8.2 4.4 - 11.3 x10*3/uL     nRBC 0.4 (H) 0.0 - 0.0 /100 WBCs     RBC 2.89 (L) 4.50 - 5.90 x10*6/uL     Hemoglobin 8.8 (L) 13.5 - 17.5 g/dL     Hematocrit 28.0 (L) 41.0 - 52.0 %     MCV 97 80 - 100 fL     MCH 30.4 26.0 - 34.0 pg     MCHC 31.4 (L) 32.0 - 36.0 g/dL     RDW 16.9 (H) 11.5 - 14.5 %     Platelets 211 150 - 450 x10*3/uL   Basic metabolic panel   Result Value Ref Range      Glucose 243 (H) 74 - 99 mg/dL     Sodium 137 136 - 145 mmol/L     Potassium 3.9 3.5 - 5.3 mmol/L     Chloride 99 98 - 107 mmol/L     Bicarbonate 29 21 - 32 mmol/L     Anion Gap 13 10 - 20 mmol/L     Urea Nitrogen 56 (H) 6 - 23 mg/dL     Creatinine 1.67 (H) 0.50 - 1.30 mg/dL     eGFR 38 (L) >60 mL/min/1.73m*2     Calcium 9.1 8.6 - 10.3 mg/dL   Protime-INR   Result Value Ref Range     Protime 16.5 (H) 9.8 - 12.4 seconds     INR 1.5 (H) 0.9 - 1.1   POCT GLUCOSE   Result Value Ref Range     POCT Glucose 227 (H) 74 - 99 mg/dL      XR chest 1 view  Result Date: 5/3/2025  Interpreted By:  Cleve Cardenas, STUDY: XR CHEST 1 VIEW; 5/3/2025 2:02 pm   INDICATION: Signs/Symptoms:anemia.   COMPARISON: Chest x-ray 12/07/2024   ACCESSION NUMBER(S): [id number]   ORDERING CLINICIAN: SUMAN HENRIQUEZ   TECHNIQUE: 1 view of the chest was performed.   FINDINGS: Indeterminate bilateral lower lobe nodular infiltrates could be infectious. No pleural effusion. No pneumothorax.  The cardiomediastinal silhouette is within normal limits. Bilateral shoulder joint degenerative changes.        1. New scattered bilateral lower lobe predominant nodular infiltrates could be infectious. Finding could be further assessed by dedicated chest CT scan.   Signed by: Cleve Cardenas 5/3/2025 2:11 PM Dictation workstation:   [id number]        Assessment & Plan  Anemia, unspecified type     Supratherapeutic INR     Pneumonia     Productive cough     Shortness of breath     Generalized weakness        Saul Asif is a 89 y.o. male presents to the ED from facility for evaluation of low hemoglobin level.  Hemoglobin level 6.3 this morning on lab work at facility.  Patient reports extreme fatigue over the past 2 days.  Also notes dark stools however denies noticing any blood in his stools.  Denies any blood in his urine or coughing up any blood.  Has had blood transfusions in the past.  Reports some generalized weakness over the past couple days and generally not  feeling very well.  Patient's family also notes shortness of breath and productive cough over the past week, granddaughter stating his breathing sounded horrible last week and when she was visiting him.  States the facility did a chest x-ray at that time which was unremarkable.      CODE STATUS: DNR, DNI     #Acute on chronic anemia  #Supratherapeutic INR  #Atrial fibrillation on Coumadin  #Generalized weakness/fatigue  Admit for observation with telemetry monitoring  -Currently taking 2.5 mg Coumadin daily for history of atrial fibrillation.  Patient does report dark stools.  PT level greater than 100, unable to calculate INR level.  2 units RBCs ordered in the ED  Repeat H&H ordered after transfusion, continue to trend, transfuse as needed for hemoglobin less than 7  5 mg IV vitamin K ordered  Stool occult blood ordered  40 mg IV Protonix daily  Monitor daily PT/INR level  Q 4 vitals  CBC, BMP in the a.m.  Cardiac/diabetic/renal diet  Fall precautions, PT/OT for evaluation and treatment  Social work consult for discharge planning     #Suspect pneumonia  #Productive cough  #Intermittent shortness of breath, worse over the past week  Chest x-ray shows new scattered bilateral lower lobe predominant nodular infiltrates  Patient afebrile, no leukocytosis.  Urine pneumonia antigens ordered, procalcitonin ordered  IV Rocephin and IV azithromycin ordered  Cough medication, breathing treatments, oxygen as needed  COVID, flu, RSV ordered     #Diabetes mellitus  Hold home oral antidiabetic medications  Sliding scale insulin  ACHS Accu-Cheks  Tresiba 8 units daily     Continue home medications as appropriate     Chronic conditions:  A-fib, CKD, HLD, hypothyroidism, prostate cancer, aortic valve stenosis, type II DM     #DVT prophylaxis  Hold home oral anticoagulation  SCDs, ambulation as tolerated           had concerns including Weakness, Gen (Pt BIBA with c/o generalized weakness and fatigue x2 days. Pt had labs drawn at [facility] which revealed a Hgb of 6.3, pt endorses dark stools. Alert and [place]. Pt is also on coumadin. Denies CP or SOB. ).        Problem List Items Addressed This Visit         Pneumonia     * (Principal) Anemia, unspecified type - Primary      Other Visit Diagnoses           On warfarin for atrial fibrillation (Multi)           Dark stools           Elevated protime                    HPI         Weakness, Gen     Additional comments: Pt BIBA with c/o generalized weakness and fatigue x2 days. [facility] which revealed a Hgb of 6.3, pt endorses dark stools. Alert and [place]. Pt is also on coumadin. Denies CP or SOB.             Last edited by Deepthi Olivia RN on 5/3/2025  1:40 PM.             Problem List as of 5/4/2025 Reviewed: 2/27/2025 10:45 AM by Matthew Tubbs MD        Abdominal bloating     Abdominal pain     Abnormal chest xray     Acquired hypothyroidism     Asymptomatic cholelithiasis     Atrial fibrillation (Multi)     Rkaus's esophagus     Diabetes mellitus (Multi)     Gastric ulcer     Helicobacter pylori (H. pylori) infection     HH (hiatus hernia)     History of colon polyps     Hyperlipidemia     Iron deficiency anemia     Irritable bowel syndrome     Leg joint pain     Severe aortic stenosis     Last Assessment & Plan 2/27/2025 Office Visit Written 2/27/2025 10:49 AM by Matthew Tubbs MD   2/27/25 echocqrdiogram svere aortic stenosis and moderate AI LVEF = 65-70%, severe MAC with moderate MR,  Tr with midly elevated RVSP (reviewed today)           Muscular pain     Personal history of Helicobacter infection     Pneumonia     Presence of artificial knee joint     Right hip pain     Tubular adenoma     Stage 4 chronic kidney disease (Multi)     Paroxysmal atrial fibrillation (Multi)     Prostate cancer (Multi)     Squamous cell cancer of skin of left cheek     Secondary hyperparathyroidism of renal origin (Multi)     RBBB     * (Principal) Anemia, unspecified type      Supratherapeutic INR     Productive cough     Shortness of breath     Generalized weakness              Active Ambulatory Problems     Diagnosis Date Noted    Abdominal bloating 10/12/2023    Abdominal pain 10/12/2023    Abnormal chest xray 10/12/2023    Acquired hypothyroidism 10/12/2023    Asymptomatic cholelithiasis 10/12/2023    Atrial fibrillation (Multi) 10/12/2023    Kraus's esophagus 10/12/2023    Diabetes mellitus (Multi) 10/12/2023    Gastric ulcer 10/12/2023    Helicobacter pylori (H. pylori) infection 10/12/2023    HH (hiatus hernia) 10/12/2023    History of colon polyps 10/12/2023    Hyperlipidemia 10/12/2023    Iron deficiency anemia 10/12/2023    Irritable bowel syndrome 10/12/2023    Leg joint pain 10/12/2023    Severe aortic stenosis 10/12/2023    Muscular pain 10/12/2023    Personal history of Helicobacter infection 10/12/2023    Pneumonia 10/12/2023    Presence of artificial knee joint 10/12/2023    Right hip pain 10/12/2023    Tubular adenoma 10/12/2023    Stage 4 chronic kidney disease (Multi) 10/18/2023    Paroxysmal atrial fibrillation (Multi) 02/15/2024    Prostate cancer (Multi) 11/09/2017    Squamous cell cancer of skin of left cheek 11/13/2017    Secondary hyperparathyroidism of renal origin (Multi) 04/23/2024    RBBB 02/27/2025           Resolved Ambulatory Problems     Diagnosis Date Noted    Obesity, Class I, BMI 30.0-34.9 (see actual BMI) 10/12/2023    COVID-19 12/27/2024    COVID 12/28/2024      No Additional Past Medical History                    Active Orders   Microbiology     Occult Blood, Stool       Frequency: Once       Number of Occurrences: 1 Occurrences   Lab     CBC       Frequency: AM draw       Number of Occurrences: 1 Occurrences     Comprehensive Metabolic Panel       Frequency: AM draw       Number of Occurrences: 1 Occurrences     Protime-INR       Frequency: AM draw       Number of Occurrences: 3 Days   Diet     Adult diet Cardiac, Renal, Consistent Carb; CCD 75  gm/meal; 70 gm fat; 2 - 3 grams Sodium; Potassium Restricted 2 gm (50mEq)       Frequency: Effective now       Number of Occurrences: Until Specified   Nursing     Activity (specify) Ambulate; Ambulate with Assistance; As Tolerated       Frequency: Until discontinued       Number of Occurrences: Until Specified     Apply sequential compression device       Frequency: Until discontinued       Number of Occurrences: Until Specified     Check pulse oximetry       Frequency: q8h       Number of Occurrences: Until Specified     Glucose 10-70 mg/dL & CONSCIOUS- Give 15 Grams of Carbohydrates and repeat until blood glucose level reaches 100 mg/dL or greater.       Frequency: Until discontinued       Number of Occurrences: Until Specified       Order Comments: Select 1 of the following:  -1 cup skim milk  -3 or 4 glucose tablets  -4 ounces of fruit juice or regular soda.  Patient MUST be CONSCIOUS and able to eat or drink        Notify provider       Frequency: Until discontinued       Number of Occurrences: Until Specified     Notify provider (specify parameters)       Frequency: Until discontinued       Number of Occurrences: Until Specified       Order Comments: [place] Hypoglycemia interventions.        Notify provider (specify parameters)       Frequency: Until discontinued       Number of Occurrences: Until Specified       Order Comments: For blood glucose greater than 200 mg/dL twice over 24 hours.  Provider to consider Endocrine Consult.        Notify provider (specify parameters)       Frequency: Until discontinued       Number of Occurrences: Until Specified     Pain Assessment       Frequency: q4h       Number of Occurrences: Until Specified     Pulse Oximetry       Frequency: Until discontinued       Number of Occurrences: Until Specified     Vital Signs       Frequency: Per unit standards       Number of Occurrences: Until Specified     Vital Signs       Frequency: q4h       Number of Occurrences: Until  Specified     Vital signs for transfusion       Frequency: Per unit standards       Number of Occurrences: Until Specified       Order Comments: Document required Vital Signs: Immediately prior to transfusion, within the first 15 minutes of transfusion, 45-60 minutes after infusion starts, and within 1 hour of completion of transfusion.        Vital signs for transfusion       Frequency: Per unit standards       Number of Occurrences: Until Specified       Order Comments: Document required Vital Signs: Immediately prior to transfusion, within the first 15 minutes of transfusion, 45-60 minutes after infusion starts, and within 1 hour of completion of transfusion.        Weight on admission       Frequency: Daily       Number of Occurrences: Until Specified   Code Status     DNR and No Intubation       Frequency: Continuous       Number of Occurrences: Until Specified   Consult     Inpatient consult to Social Work and TCC       Frequency: Once       Number of Occurrences: 1 Occurrences   Nourishments     May Participate in Room Service       Frequency: Once       Number of Occurrences: 1 Occurrences   OT     OT eval and treat       Frequency: Until therapy completed       Number of Occurrences: 1 Occurrences   PT     PT eval and treat       Frequency: Until therapy completed       Number of Occurrences: 1 Occurrences   Respiratory Care     Airway Clearance Techniques Device/modality: EzPap       Frequency: TID       Number of Occurrences: Until Specified     Respiratory care eval and treat       Frequency: Once       Number of Occurrences: 1 Occurrences       Order Comments: Triage 5  Due 5/6 0700      ECG     ECG 12 lead       Frequency: Once       Number of Occurrences: 1 Occurrences     Electrocardiogram, 12-lead PRN ACS symptoms       Frequency: PRN       Number of Occurrences: Until Specified       Order Comments: Notify provider if performed.      Precaution     Fall precautions       Frequency: Until  discontinued       Number of Occurrences: Until Specified   Point of Care Testing - Docked Device     POCT Glucose       Frequency: 4x daily - AC and at bedtime       Number of Occurrences: 3 Days       Order Comments: And PRN           POCT Glucose       Frequency: PRN       Number of Occurrences: Until Specified       Order Comments: PRN for hypoglycemia interventions instituted.  Retest blood glucose level every 15 minutes after every hypoglycemic intervention until blood glucose is greater than 100 mg/dL.         Telemetry     Telemetry monitoring - Floor only       Frequency: Until discontinued       Number of Occurrences: 3 Days   Medications     acetaminophen (Tylenol) oral liquid 650 mg       Linked Order: Or       Frequency: q4h PRN       Dose: 650 mg       Route: oral     acetaminophen (Tylenol) suppository 650 mg       Linked Order: Or       Frequency: q4h PRN       Dose: 650 mg       Route: rectal     acetaminophen (Tylenol) tablet 650 mg       Linked Order: Or       Frequency: q4h PRN       Dose: 650 mg       Route: oral     atorvastatin (Lipitor) tablet 10 mg       Frequency: Nightly       Dose: 10 mg       Route: oral     azithromycin (Zithromax) 500 mg in dextrose 5% 250 mL IV       Frequency: q24h       Dose: 500 mg       Route: intravenous     brimonidine (AlphaGAN) 0.2 % ophthalmic solution 1 drop       Frequency: BID       Dose: 1 drop       Route: Both Eyes     cefTRIAXone (Rocephin) 2 g in dextrose (iso) IV 50 mL       Frequency: q24h       Dose: 2 g       Route: intravenous     cholecalciferol (Vitamin D-3) tablet 25 mcg       Frequency: Daily       Dose: 25 mcg       Route: oral     dextrose 50 % injection 12.5 g       Frequency: q15 min PRN       Dose: 12.5 g       Route: intravenous     dextrose 50 % injection 25 g       Frequency: q15 min PRN       Dose: 25 g       Route: intravenous     docusate sodium (Colace) capsule 100 mg       Frequency: Nightly       Dose: 100 mg       Route: oral      ferrous sulfate tablet 325 mg       Frequency: Nightly       Dose: 1 tablet       Route: oral     glucagon (Glucagen) injection 1 mg       Frequency: q15 min PRN       Dose: 1 mg       Route: intramuscular     glucagon (Glucagen) injection 1 mg       Frequency: q15 min PRN       Dose: 1 mg       Route: intramuscular     guaiFENesin (Mucinex) 12 hr tablet 600 mg       Frequency: BID       Dose: 600 mg       Route: oral     insulin glargine (Lantus) injection 10 Units       Frequency: Nightly       Dose: 10 Units       Route: subcutaneous     insulin lispro injection 0-10 Units       Frequency: TID AC       Dose: 0-10 Units       Route: subcutaneous     ipratropium (Atrovent) 0.02 % nebulizer solution 0.5 mg       Frequency: TID       Dose: 0.5 mg       Route: nebulization     ipratropium-albuteroL (Duo-Neb) 0.5-2.5 mg/3 mL nebulizer solution 3 mL       Frequency: q2h PRN       Dose: 3 mL       Route: nebulization     levothyroxine (Synthroid, Levoxyl) tablet 100 mcg       Frequency: Daily       Dose: 100 mcg       Route: oral     melatonin tablet 5 mg       Frequency: Nightly PRN       Dose: 5 mg       Route: oral     oxygen (O2) therapy       Frequency: Continuous PRN - O2/gases       Route: inhalation     pantoprazole (Protonix) injection 40 mg       Frequency: Daily       Dose: 40 mg       Route: intravenous     torsemide (Demadex) tablet 20 mg       Frequency: Daily       Dose: 20 mg       Route: oral     traZODone (Desyrel) tablet 25 mg       Frequency: Nightly       Dose: 25 mg       Route: oral     warfarin (Coumadin) tablet 2.5 mg       Frequency: Daily       Dose: 2.5 mg       Route: oral       Order Comments: Ensure proper admin timing per warfarin policy.      Dietary Orders (From admission, onward)          Start     Ordered     05/03/25 1658   May Participate in Room Service  ( ROOM SERVICE MAY PARTICIPATE)  Once        Question:  .  Answer:  Yes    05/03/25 1657 05/03/25 1645   Adult diet  "Cardiac, Renal, Consistent Carb; CCD 75 gm/meal; 70 gm fat; 2 - 3 grams Sodium; Potassium Restricted 2 gm (50mEq)  Diet effective now        Question Answer Comment   Diet type Cardiac     Diet type Renal     Diet type Consistent Carb     Carb diet selection: CCD 75 gm/meal     Fat restriction: 70 gm fat     Sodium restriction: 2 - 3 grams Sodium     Potassium restriction: Potassium Restricted 2 gm (50mEq)         05/03/25 1655                       89 yrs@  ADMITDTTM@  Dark stools [R19.5]  Elevated protime [R79.1]  Anemia, unspecified type [D64.9]  On warfarin for atrial fibrillation (Multi) [I48.91, Z79.01]  [unfilled]  Weight: 81.6 kg (180 lb)     Vitals          Vitals:     05/02/25 2100 05/03/25 1330 05/03/25 1341 05/03/25 1345   BP: 118/85 111/72 111/72     Pulse: 82 86 88 87   Resp: 16   18 (!) 25   Temp: 36.5 °C (97.7 °F)   36.4 °C (97.5 °F)     TempSrc: Temporal   Temporal     SpO2: 97% 94% (!) 92% 96%   Weight:     81.6 kg (180 lb)     Height:     1.753 m (5' 9\")       05/03/25 1400 05/03/25 1415 05/03/25 1430 05/03/25 1445   BP:     133/58     Pulse: 88 84 79 80   Resp: (!) 22 20 19 19   Temp:           TempSrc:           SpO2: 96% 98% 95% 95%   Weight:           Height:             05/03/25 1500 05/03/25 1515 05/03/25 1530 05/03/25 1545   BP: 115/57         Pulse: 81 87 88 85   Resp: 20 (!) 24 (!) 23 (!) 25   Temp:           TempSrc:           SpO2: 97% 97% 97% 96%   Weight:           Height:             05/03/25 1600 05/03/25 1642 05/03/25 1645 05/03/25 1826   BP:   147/67 147/67 125/60   Pulse: 92 88 88 77   Resp: (!) 33 16   18   Temp:   36.4 °C (97.5 °F) 36.4 °C (97.5 °F) 36.3 °C (97.3 °F)   TempSrc:   Temporal   Temporal   SpO2: 96%   95% 94%   Weight:   81.6 kg (180 lb)       Height:   1.753 m (5' 9.02\")         05/03/25 1841 05/03/25 1926 05/03/25 2041 05/04/25 0042   BP: 126/60 118/85 118/85 122/58   Pulse: 98  Comment: sitting upright for dinner 82 82 71   Resp: 18 16 16 16   Temp: 36.3 °C (97.3 " °F) 36.5 °C (97.7 °F) 36.5 °C (97.7 °F) 35.8 °C (96.4 °F)   TempSrc: Temporal Temporal Temporal Temporal   SpO2: 94% 97% 97% 93%   Weight:           Height:             05/04/25 0056 05/04/25 0142 05/04/25 0345 05/04/25 0402   BP: (!) 111/6 109/59 124/60 124/60   Pulse: 73 78 72 72   Resp: 18 18 16 16   Temp: 36.3 °C (97.3 °F) 35.5 °C (95.9 °F) 36 °C (96.8 °F) 36 °C (96.8 °F)   TempSrc: Temporal Temporal Temporal Temporal   SpO2: 97% 96% 93% 93%   Weight:           Height:             05/04/25 0637 05/04/25 1058 05/04/25 1155   BP: 131/63 108/56     Pulse: 72 72     Resp: 16       Temp: 36.2 °C (97.2 °F) 36 °C (96.8 °F)     TempSrc: Temporal       SpO2: 95% 94% 95%   Weight:         Height:                        Chief Complaint    Weakness, Gen            Patient seen resting in bed with head of bed elevated, no s/s or c/o acute difficulties at this time.  Vital signs for last 24 hours Temp:  [35.5 °C (95.9 °F)-36.5 °C (97.7 °F)] 36 °C (96.8 °F)  Heart Rate:  [71-98] 72  Resp:  [16-33] 16  BP: (108-147)/(6-85) 108/56    No intake/output data recorded.  Patient still requiring frequent cardiac and SPO2 monitoring. Continue aggressive pulmonary hygiene and oral hygiene. Off loading as tolerated for skin integrity. Medications and labs reviewed-    Patient recently received an antibiotic (last 12 hours)         None                    Results for orders placed or performed during the hospital encounter of 05/03/25 (from the past 96 hours)   CBC and Auto Differential   Result Value Ref Range     WBC 10.0 4.4 - 11.3 x10*3/uL     nRBC 0.2 (H) 0.0 - 0.0 /100 WBCs     RBC 2.03 (L) 4.50 - 5.90 x10*6/uL     Hemoglobin 6.3 (LL) 13.5 - 17.5 g/dL     Hematocrit 20.8 (L) 41.0 - 52.0 %      (H) 80 - 100 fL     MCH 31.0 26.0 - 34.0 pg     MCHC 30.3 (L) 32.0 - 36.0 g/dL     RDW 15.3 (H) 11.5 - 14.5 %     Platelets 241 150 - 450 x10*3/uL     Neutrophils % 78.6 40.0 - 80.0 %     Immature Granulocytes %, Automated 0.7 0.0 - 0.9  %     Lymphocytes % 11.9 13.0 - 44.0 %     Monocytes % 6.2 2.0 - 10.0 %     Eosinophils % 2.2 0.0 - 6.0 %     Basophils % 0.4 0.0 - 2.0 %     Neutrophils Absolute 7.89 (H) 1.60 - 5.50 x10*3/uL     Immature Granulocytes Absolute, Automated 0.07 0.00 - 0.50 x10*3/uL     Lymphocytes Absolute 1.19 0.80 - 3.00 x10*3/uL     Monocytes Absolute 0.62 0.05 - 0.80 x10*3/uL     Eosinophils Absolute 0.22 0.00 - 0.40 x10*3/uL     Basophils Absolute 0.04 0.00 - 0.10 x10*3/uL   Comprehensive metabolic panel   Result Value Ref Range     Glucose 240 (H) 74 - 99 mg/dL     Sodium 139 136 - 145 mmol/L     Potassium 4.3 3.5 - 5.3 mmol/L     Chloride 103 98 - 107 mmol/L     Bicarbonate 28 21 - 32 mmol/L     Anion Gap 12 10 - 20 mmol/L     Urea Nitrogen 55 (H) 6 - 23 mg/dL     Creatinine 1.60 (H) 0.50 - 1.30 mg/dL     eGFR 40 (L) >60 mL/min/1.73m*2     Calcium 9.6 8.6 - 10.3 mg/dL     Albumin 3.6 3.4 - 5.0 g/dL     Alkaline Phosphatase 65 33 - 136 U/L     Total Protein 6.3 (L) 6.4 - 8.2 g/dL     AST 17 9 - 39 U/L     Bilirubin, Total 0.3 0.0 - 1.2 mg/dL     ALT 20 10 - 52 U/L   Protime-INR   Result Value Ref Range     Protime >100.0 (HH) 9.8 - 12.4 seconds     INR       Troponin I, High Sensitivity   Result Value Ref Range     Troponin I, High Sensitivity 13 0 - 20 ng/L   Type and screen   Result Value Ref Range     ABO TYPE AB       Rh TYPE POS       ANTIBODY SCREEN NEG     Magnesium   Result Value Ref Range     Magnesium 1.93 1.60 - 2.40 mg/dL   Verify ABO/Rh Group Test (VERAB)   Result Value Ref Range     ABO TYPE AB       Rh TYPE POS     Prepare RBC: 2 Units   Result Value Ref Range     PRODUCT CODE D9778D35       Unit Number [id number]       Unit ABO A       Unit RH POS       XM INTEP COMP       Dispense Status TR       Blood Expiration Date 5/23/2025 11:59:00 PM EDT       PRODUCT BLOOD TYPE 6200       UNIT VOLUME 350       PRODUCT CODE E7848K65       Unit Number [id number]       Unit ABO A       Unit RH POS       XM INTEP  COMP       Dispense Status IS       Blood Expiration Date 5/23/2025 11:59:00 PM EDT       PRODUCT BLOOD TYPE 6200       UNIT VOLUME 350     Sars-CoV-2, Influenza A/B and RSV PCR   Result Value Ref Range     Coronavirus 2019, PCR Not Detected Not Detected     Flu A Result Not Detected Not Detected     Flu B Result Not Detected Not Detected     RSV PCR Not Detected Not Detected   Legionella Antigen, Urine     Specimen: Clean Catch/Voided; Urine   Result Value Ref Range     L. pneumophila Urine Ag Negative Negative   Streptococcus pneumoniae Antigen, Urine     Specimen: Clean Catch/Voided; Urine   Result Value Ref Range     Streptococcus pneumoniae Ag, Urine Negative Negative   POCT GLUCOSE   Result Value Ref Range     POCT Glucose 279 (H) 74 - 99 mg/dL   POCT GLUCOSE   Result Value Ref Range     POCT Glucose 227 (H) 74 - 99 mg/dL   Hemoglobin and hematocrit, blood   Result Value Ref Range     Hemoglobin 8.8 (L) 13.5 - 17.5 g/dL     Hematocrit 28.0 (L) 41.0 - 52.0 %   CBC   Result Value Ref Range     WBC 8.2 4.4 - 11.3 x10*3/uL     nRBC 0.4 (H) 0.0 - 0.0 /100 WBCs     RBC 2.89 (L) 4.50 - 5.90 x10*6/uL     Hemoglobin 8.8 (L) 13.5 - 17.5 g/dL     Hematocrit 28.0 (L) 41.0 - 52.0 %     MCV 97 80 - 100 fL     MCH 30.4 26.0 - 34.0 pg     MCHC 31.4 (L) 32.0 - 36.0 g/dL     RDW 16.9 (H) 11.5 - 14.5 %     Platelets 211 150 - 450 x10*3/uL   Basic metabolic panel   Result Value Ref Range     Glucose 243 (H) 74 - 99 mg/dL     Sodium 137 136 - 145 mmol/L     Potassium 3.9 3.5 - 5.3 mmol/L     Chloride 99 98 - 107 mmol/L     Bicarbonate 29 21 - 32 mmol/L     Anion Gap 13 10 - 20 mmol/L     Urea Nitrogen 56 (H) 6 - 23 mg/dL     Creatinine 1.67 (H) 0.50 - 1.30 mg/dL     eGFR 38 (L) >60 mL/min/1.73m*2     Calcium 9.1 8.6 - 10.3 mg/dL   Protime-INR   Result Value Ref Range     Protime 16.5 (H) 9.8 - 12.4 seconds     INR 1.5 (H) 0.9 - 1.1   POCT GLUCOSE   Result Value Ref Range     POCT Glucose 227 (H) 74 - 99 mg/dL           Patient  fully evaluated 05/03, thorough record review performed of previous labs and notes from prior encounters. Plan discussed with interdisciplinary team, consults placed, appreciate input. Will continue current and repeat labs in the AM.          Discharge planning discussed with patient and care team. Therapy evaluations ordered. Patient aware and agreeable to current plan, continue plan as above.  Pulmonology consultation obtained, appreciate input.     I spent a total of 75 minutes on the date of the service which included preparing to see the patient, face-to-face patient care, completing clinical documentation, obtaining and/or reviewing separately obtained history, performing a medically appropriate examination, counseling and educating the patient/family/caregiver, ordering medications, tests, or procedures, communicating with other HCPs (not separately reported), independently interpreting results (not separately reported), communicating results to the patient/family/caregiver, and care coordination (not separately reported).         Ngozi Lea                 Revision History         Objective     Last Recorded Vitals  /58 (BP Location: Right arm, Patient Position: Lying)   Pulse 86   Temp 35.9 °C (96.6 °F) (Temporal)   Resp 18   Wt 81.6 kg (180 lb)   SpO2 96%   Intake/Output last 3 Shifts:    Intake/Output Summary (Last 24 hours) at 5/5/2025 1424  Last data filed at 5/5/2025 1232  Gross per 24 hour   Intake 230 ml   Output 300 ml   Net -70 ml       Admission Weight  Weight: 81.6 kg (180 lb) (05/03/25 1341)    Daily Weight  05/03/25 : 81.6 kg (180 lb)    Image Results  ECG 12 lead  Sinus or ectopic atrial rhythm  Prolonged AR interval  Right bundle branch block      Physical Exam  General appearance: Not in pain or distress, in no respiratory distress. Weakness noted    HEENT: Atraumatic/normocephalic, EOMI, SABRA, pharynx clear, moist mucosa, redness of the uvula appreciated,   Neck: Supple, no  jugular venous distension, lymphadenopathy, thyromegaly or carotid bruits  Chest: Rhonchi  Cardiovascular: Normal S1, S2, regular rate and rhythm, no murmur, rub or gallop  Abdomen: Normal sounds present, soft, lax with no tenderness, no hepatosplenomegaly, and no masses  Extremities: No edema. Pulses are equally present.   Skin: intact, no rashes   Neurologic: Alert and oriented x 3, No focal deficit        Assessment & Plan    Patient evaluated May 5. Patient anemic at 8.6. Overall, anemia is improving. 2 units of PRBCs ordered. PT down trending from >100, to 16.5 today. GI consult placed, would appreciate input. Stool occult blood pending. Hyperglycemic at 216. Continue to monitor H/H.     Discharge planning discussed with patient and care team. Therapy evaluations ordered. Patient aware and agreeable to current plan, continue plan as above.     I spent a total of 75 minutes on the date of the service which included preparing to see the patient, face-to-face patient care, completing clinical documentation, obtaining and/or reviewing separately obtained history, performing a medically appropriate examination, counseling and educating the patient/family/caregiver, ordering medications, tests, or procedures, communicating with other HCPs (not separately reported), independently interpreting results (not separately reported), communicating results to the patient/family/caregiver, and care coordination (not separately reported).

## 2025-05-06 PROBLEM — J18.9 PNEUMONIA: Status: RESOLVED | Noted: 2023-10-12 | Resolved: 2025-05-06

## 2025-05-06 LAB
ALBUMIN SERPL BCP-MCNC: 3.5 G/DL (ref 3.4–5)
ALP SERPL-CCNC: 64 U/L (ref 33–136)
ALT SERPL W P-5'-P-CCNC: 19 U/L (ref 10–52)
ANION GAP SERPL CALC-SCNC: 13 MMOL/L (ref 10–20)
AST SERPL W P-5'-P-CCNC: 22 U/L (ref 9–39)
BASOPHILS # BLD AUTO: 0.03 X10*3/UL (ref 0–0.1)
BASOPHILS NFR BLD AUTO: 0.3 %
BILIRUB SERPL-MCNC: 0.5 MG/DL (ref 0–1.2)
BUN SERPL-MCNC: 35 MG/DL (ref 6–23)
CALCIUM SERPL-MCNC: 8.8 MG/DL (ref 8.6–10.3)
CHLORIDE SERPL-SCNC: 97 MMOL/L (ref 98–107)
CO2 SERPL-SCNC: 30 MMOL/L (ref 21–32)
CREAT SERPL-MCNC: 1.67 MG/DL (ref 0.5–1.3)
EGFRCR SERPLBLD CKD-EPI 2021: 38 ML/MIN/1.73M*2
EOSINOPHIL # BLD AUTO: 0.16 X10*3/UL (ref 0–0.4)
EOSINOPHIL NFR BLD AUTO: 1.5 %
ERYTHROCYTE [DISTWIDTH] IN BLOOD BY AUTOMATED COUNT: 17 % (ref 11.5–14.5)
GLUCOSE BLD MANUAL STRIP-MCNC: 175 MG/DL (ref 74–99)
GLUCOSE BLD MANUAL STRIP-MCNC: 197 MG/DL (ref 74–99)
GLUCOSE BLD MANUAL STRIP-MCNC: 203 MG/DL (ref 74–99)
GLUCOSE BLD MANUAL STRIP-MCNC: 326 MG/DL (ref 74–99)
GLUCOSE SERPL-MCNC: 179 MG/DL (ref 74–99)
HCT VFR BLD AUTO: 30.6 % (ref 41–52)
HGB BLD-MCNC: 9.3 G/DL (ref 13.5–17.5)
IMM GRANULOCYTES # BLD AUTO: 0.09 X10*3/UL (ref 0–0.5)
IMM GRANULOCYTES NFR BLD AUTO: 0.8 % (ref 0–0.9)
INR PPP: 1.5 (ref 0.9–1.1)
LYMPHOCYTES # BLD AUTO: 1.02 X10*3/UL (ref 0.8–3)
LYMPHOCYTES NFR BLD AUTO: 9.6 %
MCH RBC QN AUTO: 29.7 PG (ref 26–34)
MCHC RBC AUTO-ENTMCNC: 30.4 G/DL (ref 32–36)
MCV RBC AUTO: 98 FL (ref 80–100)
MONOCYTES # BLD AUTO: 1.05 X10*3/UL (ref 0.05–0.8)
MONOCYTES NFR BLD AUTO: 9.9 %
NEUTROPHILS # BLD AUTO: 8.25 X10*3/UL (ref 1.6–5.5)
NEUTROPHILS NFR BLD AUTO: 77.9 %
NRBC BLD-RTO: 0 /100 WBCS (ref 0–0)
PLATELET # BLD AUTO: 255 X10*3/UL (ref 150–450)
POTASSIUM SERPL-SCNC: 3.6 MMOL/L (ref 3.5–5.3)
PROT SERPL-MCNC: 6.4 G/DL (ref 6.4–8.2)
PROTHROMBIN TIME: 16.1 SECONDS (ref 9.8–12.4)
RBC # BLD AUTO: 3.13 X10*6/UL (ref 4.5–5.9)
SODIUM SERPL-SCNC: 136 MMOL/L (ref 136–145)
WBC # BLD AUTO: 10.6 X10*3/UL (ref 4.4–11.3)

## 2025-05-06 PROCEDURE — 2500000004 HC RX 250 GENERAL PHARMACY W/ HCPCS (ALT 636 FOR OP/ED): Performed by: INTERNAL MEDICINE

## 2025-05-06 PROCEDURE — 2500000004 HC RX 250 GENERAL PHARMACY W/ HCPCS (ALT 636 FOR OP/ED): Mod: JZ | Performed by: PHYSICIAN ASSISTANT

## 2025-05-06 PROCEDURE — 94640 AIRWAY INHALATION TREATMENT: CPT

## 2025-05-06 PROCEDURE — 2500000002 HC RX 250 W HCPCS SELF ADMINISTERED DRUGS (ALT 637 FOR MEDICARE OP, ALT 636 FOR OP/ED): Performed by: PHYSICIAN ASSISTANT

## 2025-05-06 PROCEDURE — 80053 COMPREHEN METABOLIC PANEL: CPT | Performed by: INTERNAL MEDICINE

## 2025-05-06 PROCEDURE — 1200000002 HC GENERAL ROOM WITH TELEMETRY DAILY

## 2025-05-06 PROCEDURE — 99232 SBSQ HOSP IP/OBS MODERATE 35: CPT

## 2025-05-06 PROCEDURE — 2500000001 HC RX 250 WO HCPCS SELF ADMINISTERED DRUGS (ALT 637 FOR MEDICARE OP): Performed by: PHYSICIAN ASSISTANT

## 2025-05-06 PROCEDURE — 82947 ASSAY GLUCOSE BLOOD QUANT: CPT

## 2025-05-06 PROCEDURE — 85025 COMPLETE CBC W/AUTO DIFF WBC: CPT | Performed by: INTERNAL MEDICINE

## 2025-05-06 PROCEDURE — 2500000002 HC RX 250 W HCPCS SELF ADMINISTERED DRUGS (ALT 637 FOR MEDICARE OP, ALT 636 FOR OP/ED): Performed by: INTERNAL MEDICINE

## 2025-05-06 PROCEDURE — 85610 PROTHROMBIN TIME: CPT | Performed by: PHYSICIAN ASSISTANT

## 2025-05-06 PROCEDURE — 2500000001 HC RX 250 WO HCPCS SELF ADMINISTERED DRUGS (ALT 637 FOR MEDICARE OP): Performed by: INTERNAL MEDICINE

## 2025-05-06 PROCEDURE — 2500000001 HC RX 250 WO HCPCS SELF ADMINISTERED DRUGS (ALT 637 FOR MEDICARE OP)

## 2025-05-06 PROCEDURE — 36415 COLL VENOUS BLD VENIPUNCTURE: CPT | Performed by: INTERNAL MEDICINE

## 2025-05-06 RX ORDER — AMOXICILLIN 250 MG
1 CAPSULE ORAL NIGHTLY
Status: DISCONTINUED | OUTPATIENT
Start: 2025-05-06 | End: 2025-05-09 | Stop reason: HOSPADM

## 2025-05-06 RX ORDER — POLYETHYLENE GLYCOL 3350, SODIUM CHLORIDE, SODIUM BICARBONATE, POTASSIUM CHLORIDE 420; 11.2; 5.72; 1.48 G/4L; G/4L; G/4L; G/4L
4000 POWDER, FOR SOLUTION ORAL ONCE
Status: COMPLETED | OUTPATIENT
Start: 2025-05-06 | End: 2025-05-06

## 2025-05-06 RX ORDER — POLYETHYLENE GLYCOL 3350 17 G/17G
17 POWDER, FOR SOLUTION ORAL DAILY
Status: CANCELLED | OUTPATIENT
Start: 2025-05-06

## 2025-05-06 RX ORDER — POLYETHYLENE GLYCOL 3350 17 G/17G
17 POWDER, FOR SOLUTION ORAL DAILY
Status: DISCONTINUED | OUTPATIENT
Start: 2025-05-06 | End: 2025-05-09 | Stop reason: HOSPADM

## 2025-05-06 RX ADMIN — IPRATROPIUM BROMIDE 0.5 MG: 0.5 SOLUTION RESPIRATORY (INHALATION) at 11:31

## 2025-05-06 RX ADMIN — IPRATROPIUM BROMIDE 0.5 MG: 0.5 SOLUTION RESPIRATORY (INHALATION) at 07:04

## 2025-05-06 RX ADMIN — INSULIN LISPRO 2 UNITS: 100 INJECTION, SOLUTION INTRAVENOUS; SUBCUTANEOUS at 18:11

## 2025-05-06 RX ADMIN — TRAZODONE HYDROCHLORIDE 25 MG: 50 TABLET ORAL at 21:40

## 2025-05-06 RX ADMIN — CEFTRIAXONE SODIUM 2 G: 2 INJECTION, SOLUTION INTRAVENOUS at 21:39

## 2025-05-06 RX ADMIN — Medication 25 MCG: at 08:44

## 2025-05-06 RX ADMIN — BRIMONIDINE TARTRATE 1 DROP: 2 SOLUTION/ DROPS OPHTHALMIC at 21:00

## 2025-05-06 RX ADMIN — POLYETHYLENE GLYCOL 3350 17 G: 17 POWDER, FOR SOLUTION ORAL at 14:57

## 2025-05-06 RX ADMIN — INSULIN GLARGINE 10 UNITS: 100 INJECTION, SOLUTION SUBCUTANEOUS at 21:39

## 2025-05-06 RX ADMIN — ATORVASTATIN CALCIUM 10 MG: 10 TABLET, FILM COATED ORAL at 21:40

## 2025-05-06 RX ADMIN — GUAIFENESIN 600 MG: 600 TABLET, EXTENDED RELEASE ORAL at 21:40

## 2025-05-06 RX ADMIN — FERROUS SULFATE TAB 325 MG (65 MG ELEMENTAL FE) 325 MG: 325 (65 FE) TAB at 21:40

## 2025-05-06 RX ADMIN — GUAIFENESIN 600 MG: 600 TABLET, EXTENDED RELEASE ORAL at 08:44

## 2025-05-06 RX ADMIN — BISACODYL 5 MG: 5 TABLET, COATED ORAL at 08:44

## 2025-05-06 RX ADMIN — INSULIN LISPRO 2 UNITS: 100 INJECTION, SOLUTION INTRAVENOUS; SUBCUTANEOUS at 08:45

## 2025-05-06 RX ADMIN — INSULIN LISPRO 8 UNITS: 100 INJECTION, SOLUTION INTRAVENOUS; SUBCUTANEOUS at 12:26

## 2025-05-06 RX ADMIN — AZITHROMYCIN DIHYDRATE 500 MG: 500 INJECTION, POWDER, LYOPHILIZED, FOR SOLUTION INTRAVENOUS at 17:10

## 2025-05-06 RX ADMIN — TORSEMIDE 20 MG: 20 TABLET ORAL at 08:44

## 2025-05-06 RX ADMIN — POLYETHYLENE GLYCOL-3350, SODIUM CHLORIDE, POTASSIUM CHLORIDE AND SODIUM BICARBONATE 4000 ML: 420; 11.2; 5.72; 1.48 POWDER, FOR SOLUTION ORAL at 17:17

## 2025-05-06 RX ADMIN — PANTOPRAZOLE SODIUM 40 MG: 40 INJECTION, POWDER, FOR SOLUTION INTRAVENOUS at 08:44

## 2025-05-06 RX ADMIN — BRIMONIDINE TARTRATE 1 DROP: 2 SOLUTION/ DROPS OPHTHALMIC at 08:44

## 2025-05-06 RX ADMIN — LEVOTHYROXINE SODIUM 100 MCG: 0.1 TABLET ORAL at 05:37

## 2025-05-06 RX ADMIN — SENNOSIDES AND DOCUSATE SODIUM 1 TABLET: 50; 8.6 TABLET ORAL at 21:40

## 2025-05-06 ASSESSMENT — PAIN SCALES - WONG BAKER: WONGBAKER_NUMERICALRESPONSE: NO HURT

## 2025-05-06 ASSESSMENT — COGNITIVE AND FUNCTIONAL STATUS - GENERAL
CLIMB 3 TO 5 STEPS WITH RAILING: A LOT
PERSONAL GROOMING: A LITTLE
MOBILITY SCORE: 17
HELP NEEDED FOR BATHING: A LITTLE
DRESSING REGULAR UPPER BODY CLOTHING: A LITTLE
TOILETING: A LITTLE
TURNING FROM BACK TO SIDE WHILE IN FLAT BAD: A LITTLE
WALKING IN HOSPITAL ROOM: A LITTLE
MOVING FROM LYING ON BACK TO SITTING ON SIDE OF FLAT BED WITH BEDRAILS: A LITTLE
MOVING TO AND FROM BED TO CHAIR: A LITTLE
DAILY ACTIVITIY SCORE: 19
TURNING FROM BACK TO SIDE WHILE IN FLAT BAD: A LITTLE
MOBILITY SCORE: 17
MOVING FROM LYING ON BACK TO SITTING ON SIDE OF FLAT BED WITH BEDRAILS: A LITTLE
CLIMB 3 TO 5 STEPS WITH RAILING: A LOT
DRESSING REGULAR LOWER BODY CLOTHING: A LITTLE
MOVING TO AND FROM BED TO CHAIR: A LITTLE
STANDING UP FROM CHAIR USING ARMS: A LITTLE
STANDING UP FROM CHAIR USING ARMS: A LITTLE
WALKING IN HOSPITAL ROOM: A LITTLE

## 2025-05-06 ASSESSMENT — PAIN SCALES - GENERAL
PAINLEVEL_OUTOF10: 0 - NO PAIN
PAINLEVEL_OUTOF10: 0 - NO PAIN

## 2025-05-06 NOTE — PROGRESS NOTES
Pulmonary Critical Care consult  Patient Name :Saul Asif  Date of service : 05/06/25  MRN: [id number]  YOB: 1933      Reason for consultation  Cough/recent COVID infection    History of Present Illness:      89 y.o. male  on day  2 of admission presenting with Anemia, unspecified type.  Patient was transferred from nursing facility due to increased sugar fatigue, had a recent COVID infection at the beginning of the year, last chest x-ray available for comparison was in 2024, his x-ray done during this admission showed bibasilar 8 atypical infiltrate,, workup in the ED revealed anemia, he was 92% on room air but does have moist cough, white count is normal at 10    Daily progress:  May 6, 2025: Patient continues to be on room air.  She continues to have mild productive cough.  He has no fever, chills, rigors.  X-ray from May 3, 2025 was personally reviewed and independently interpreted and showed bibasilar opacities.  Patient was discussed with primary team.        Past Medical/Surgical History:    has a past medical history of Diabetes mellitus (Multi).   has no past surgical history on file.   reports that he has quit smoking. His smoking use included cigarettes. He has never used smokeless tobacco. No history on file for alcohol use and drug use.  Family History:   No relevant family history has been documented for this patient.    Allergies:     Patient has no known allergies.      Social history:   reports that he has quit smoking. His smoking use included cigarettes. He has never used smokeless tobacco.      Review of Systems:   General: denies weight gain, denies loss of appetite, fever, chills, night sweats.  HEENT: denies headaches, dizziness, head trauma, visual changes, eye pain, tinnitus, nosebleeds, hoarseness or throat pain    Respiratory: denies chest pain, dyspnea, cough and hemoptysis  Cardiovascular: denies orthopnea, paroxysmal nocturnal dyspnea, leg swelling, and previous heart  attack.    Gastrointestinal: denies pain, nausea vomiting, diarrhea, constipation, melena or bleeding.  Genitourinary: denies hematuria, frequency, urgency or dysuria  Neurology: denies syncope, seizures, paralysis, paraesthesia   Endocrine: denies polyuria, polydipsia, skin or hair changes, and heat or cold intolerance  Musculoskeletal: denies joint pain, swelling, arthritis or myalgia  Hematologic: denies bleeding, adenopathy and easy bruising  Skin: denies rashes and skin discoloration  Psychiatry: denies depression    Physical Exam:   Vital Signs:   Vitals:    05/06/25 1131   BP:    Pulse:    Resp:    Temp:    SpO2: 93%     Vitals:    05/06/25 0527   Weight: 78.5 kg (173 lb 1 oz)         Input/Output:    Intake/Output Summary (Last 24 hours) at 5/6/2025 1410  Last data filed at 5/6/2025 0527  Gross per 24 hour   Intake 842 ml   Output 1450 ml   Net -608 ml       Oxygen requirements:    Ventilator Information:     Oxygen Therapy/Pulse Ox  Medical Gas Therapy: None (Room air)  SpO2: 93 %  Temp: 36 °C (96.8 °F)        General appearance: Not in pain or distress, in no respiratory distress    HEENT: Atraumatic/normocephalic, EOMI, SABRA, pharynx clear, moist mucosa, redness of the uvula appreciated,   Neck: Supple, no jugular venous distension, lymphadenopathy, thyromegaly or carotid bruits  Chest: Rhonchi  Cardiovascular: Normal S1, S2, regular rate and rhythm, no murmur, rub or gallop  Abdomen: Normal sounds present, soft, lax with no tenderness, no hepatosplenomegaly, and no masses  Extremities: No edema. Pulses are equally present.   Skin: intact, no rashes   Neurologic: Alert and oriented x 3, No focal deficit         Current Medications:   Scheduled medications  Scheduled Medications[1]  Continuous medications  Continuous Medications[2]  PRN medications  PRN Medications[3]      Investigations:  Labs, radiological imaging and cardiac work up were personally reviewed    Results from last 7 days   Lab Units  05/06/25  0654 05/05/25  0655 05/04/25  0611   SODIUM mmol/L 136 136 137   POTASSIUM mmol/L 3.6 3.7 3.9   CHLORIDE mmol/L 97* 97* 99   CO2 mmol/L 30 31 29   BUN mg/dL 35* 41* 56*   CREATININE mg/dL 1.67* 1.62* 1.67*   GLUCOSE mg/dL 179* 216* 243*   CALCIUM mg/dL 8.8 8.8 9.1     Results from last 7 days   Lab Units 05/06/25  0654   WBC AUTO x10*3/uL 10.6   HEMOGLOBIN g/dL 9.3*   HEMATOCRIT % 30.6*   PLATELETS AUTO x10*3/uL 255         Imaging  XR chest 1 view  Result Date: 5/3/2025  1. New scattered bilateral lower lobe predominant nodular infiltrates could be infectious. Finding could be further assessed by dedicated chest CT scan.   Signed by: Cleve Cardenas 5/3/2025 2:11 PM Dictation workstation:   [id number]      Cardiology, Vascular, and Other Imaging  ECG 12 lead  Result Date: 5/5/2025  Sinus or ectopic atrial rhythm Prolonged AL interval Right bundle branch block        Assessment  Saul Asif IS 89 y.o. male  on day  2 of admission presenting with Anemia, unspecified type. Actively being treated for the  following medical Problems:    Bibasilar pulm infiltrates, no fever or leukocytosis, recent COVID infection  Possible community-acquired pneumonia versus remanence of recent pneumonia on the chest x-ray  Anemia with hemoglobin less than 7  Multifactorial shortness of breath  CKD  A-fib on anticoagulation  Aortic stenosis    Recommendation:    Continue with ceftriaxone and azithromycin for 5 days  INR is 1.5 which covers for DVT prophylaxis.  Viral panels were negative  Patient is not requiring oxygen, oxygen for saturation of 89 to 94%  Incentive spirometry  Bronchodilators therapy as needed  PT/OT  DVT prophylaxis  Minimize benzodiazepine and narcotics  Encourage ambulation  Head of bed elevation and aspiration precautions.  Ipratropium every 8 hours      Muriel Moreau MD  Pulmonary critical care consultant  05/06/25  2:10 PM       STAFF PHYSICIAN NOTE OF PERSONAL INVOLVEMENT IN CARE  As the  attending physician, I certify that I personally reviewed the patient's history and personally examined the patient to confirm the physical findings described above, and that I reviewed the relevant imaging studies and available reports.  I also discussed the differential diagnosis and all of the proposed management plans with the patient and individuals accompanying the patient to this visit.  They had the opportunity to ask questions about the proposed management plans and to have those questions answered.           [1] atorvastatin, 10 mg, oral, Nightly  azithromycin, 500 mg, intravenous, q24h  bisacodyl, 5 mg, oral, Daily  brimonidine, 1 drop, Both Eyes, BID  cefTRIAXone, 2 g, intravenous, q24h  cholecalciferol, 25 mcg, oral, Daily  docusate sodium, 100 mg, oral, Nightly  ferrous sulfate, 1 tablet, oral, Nightly  guaiFENesin, 600 mg, oral, BID  insulin glargine, 10 Units, subcutaneous, Nightly  insulin lispro, 0-10 Units, subcutaneous, TID AC  ipratropium, 0.5 mg, nebulization, TID  levothyroxine, 100 mcg, oral, Daily  pantoprazole, 40 mg, intravenous, Daily  torsemide, 20 mg, oral, Daily  traZODone, 25 mg, oral, Nightly  [Held by provider] warfarin, 2.5 mg, oral, Daily     [2]    [3] PRN medications: acetaminophen **OR** acetaminophen **OR** acetaminophen, dextrose, dextrose, glucagon, glucagon, ipratropium-albuteroL, melatonin, oxygen, polyethylene glycol

## 2025-05-06 NOTE — PROGRESS NOTES
Subjective   Saul Asif is a 89 y.o. male on day 1 of admission presenting with Anemia, unspecified type.          Sameer Weaver MD   Physician  Internal Medicine     H&P      Signed     Date of Service: 5/3/2025  5:40 PM     Signed       Expand All Collapse All    History Of Present Illness  Saul Asif is a 89 y.o. male with past medical history significant for atrial fibrillation on Coumadin, CKD, HLD, hypothyroidism, prostate cancer, aortic valve stenosis, and type 2 diabetes mellitus who presents to the ED from facility for evaluation of low hemoglobin level.  Hemoglobin level 6.3 this morning on lab work at facility.  Patient reports extreme fatigue over the past 2 days.  Also notes dark stools however denies noticing any blood in his stools.  Denies any blood in his urine or coughing up any blood.  Has had blood transfusions in the past.  Reports some generalized weakness over the past couple days and generally not feeling very well.  Patient's family also notes shortness of breath and productive cough over the past week, granddaughter stating his breathing sounded horrible last week and when she was visiting him.  States the facility did a chest x-ray at that time which was unremarkable.  Patient still reports congestion, coughing up yellow phlegm and intermittent shortness of breath.  Denies any fevers or chills.  Denies headaches, dizziness, chest pains, abdominal pain or nausea, appetite changes, or urinary changes.  Says he had COVID at the beginning of the year which was very hard on him.     ED course: On arrival to the ED, patient afebrile and hemodynamically stable with SpO2 92% on room air.  Glucose 240, electrolytes WNL, BUN 55/creatinine 1.6, LFTs WNL, troponin 13.  PT greater than 100, unable to calculate INR level.  WBC 10, hemoglobin 6.3/hematocrit 20.8, platelets 241.  Chest x-ray shows new scattered bilateral lower lobe predominant nodular infiltrates could be infectious.     EKG  "(interpreted by ED physician): Sinus rhythm with prolonged WV interval, rate of 85, RBBB, no ST segment depression or elevation consistent with ischemia or infarction.           Past Medical History  Medical History     Surgical History  Surgical History     Social History  He reports that he has quit smoking. His smoking use included cigarettes. He has never used smokeless tobacco. No history on file for alcohol use and drug use.     Family History  Family History     Allergies  Patient has no known allergies.     Review of Systems   All other systems reviewed and are negative.     10 point review system negative except as noted above in HPI     Physical Exam  Vitals and nursing note reviewed.   Constitutional:       General: He is not in acute distress.     Appearance: Normal appearance. He is not toxic-appearing.   HENT:      Head: Normocephalic and atraumatic.      Mouth/Throat:      Pharynx: Oropharynx is clear.   Eyes:      Conjunctiva/sclera: Conjunctivae normal.   Cardiovascular:      Rate and Rhythm: Normal rate and regular rhythm.   Pulmonary:      Effort: Pulmonary effort is normal.      Breath sounds: No wheezing.      Comments: Coarse breath sounds bilaterally.  Currently on room air, SpO2 93%.  Tachypneic.  Abdominal:      General: Bowel sounds are normal. There is no distension.      Palpations: Abdomen is soft.      Tenderness: There is no abdominal tenderness.   Musculoskeletal:         General: Normal range of motion.      Right lower leg: No edema.      Left lower leg: No edema.   Skin:     General: Skin is warm and dry.   Neurological:      Mental Status: He is alert and oriented to person, place, and time.   Psychiatric:         Behavior: Behavior normal.         Last Recorded Vitals  Blood pressure 108/56, pulse 72, temperature 36 °C (96.8 °F), resp. rate 16, height 1.753 m (5' 9.02\"), weight 81.6 kg (180 lb), SpO2 95%.     Relevant Results        Results for orders placed or performed during " the hospital encounter of 05/03/25 (from the past 24 hours)   CBC and Auto Differential   Result Value Ref Range     WBC 10.0 4.4 - 11.3 x10*3/uL     nRBC 0.2 (H) 0.0 - 0.0 /100 WBCs     RBC 2.03 (L) 4.50 - 5.90 x10*6/uL     Hemoglobin 6.3 (LL) 13.5 - 17.5 g/dL     Hematocrit 20.8 (L) 41.0 - 52.0 %      (H) 80 - 100 fL     MCH 31.0 26.0 - 34.0 pg     MCHC 30.3 (L) 32.0 - 36.0 g/dL     RDW 15.3 (H) 11.5 - 14.5 %     Platelets 241 150 - 450 x10*3/uL     Neutrophils % 78.6 40.0 - 80.0 %     Immature Granulocytes %, Automated 0.7 0.0 - 0.9 %     Lymphocytes % 11.9 13.0 - 44.0 %     Monocytes % 6.2 2.0 - 10.0 %     Eosinophils % 2.2 0.0 - 6.0 %     Basophils % 0.4 0.0 - 2.0 %     Neutrophils Absolute 7.89 (H) 1.60 - 5.50 x10*3/uL     Immature Granulocytes Absolute, Automated 0.07 0.00 - 0.50 x10*3/uL     Lymphocytes Absolute 1.19 0.80 - 3.00 x10*3/uL     Monocytes Absolute 0.62 0.05 - 0.80 x10*3/uL     Eosinophils Absolute 0.22 0.00 - 0.40 x10*3/uL     Basophils Absolute 0.04 0.00 - 0.10 x10*3/uL   Comprehensive metabolic panel   Result Value Ref Range     Glucose 240 (H) 74 - 99 mg/dL     Sodium 139 136 - 145 mmol/L     Potassium 4.3 3.5 - 5.3 mmol/L     Chloride 103 98 - 107 mmol/L     Bicarbonate 28 21 - 32 mmol/L     Anion Gap 12 10 - 20 mmol/L     Urea Nitrogen 55 (H) 6 - 23 mg/dL     Creatinine 1.60 (H) 0.50 - 1.30 mg/dL     eGFR 40 (L) >60 mL/min/1.73m*2     Calcium 9.6 8.6 - 10.3 mg/dL     Albumin 3.6 3.4 - 5.0 g/dL     Alkaline Phosphatase 65 33 - 136 U/L     Total Protein 6.3 (L) 6.4 - 8.2 g/dL     AST 17 9 - 39 U/L     Bilirubin, Total 0.3 0.0 - 1.2 mg/dL     ALT 20 10 - 52 U/L   Protime-INR   Result Value Ref Range     Protime >100.0 (HH) 9.8 - 12.4 seconds     INR       Troponin I, High Sensitivity   Result Value Ref Range     Troponin I, High Sensitivity 13 0 - 20 ng/L   Type and screen   Result Value Ref Range     ABO TYPE AB       Rh TYPE POS       ANTIBODY SCREEN NEG     Magnesium   Result Value  Ref Range     Magnesium 1.93 1.60 - 2.40 mg/dL   Verify ABO/Rh Group Test (VERAB)   Result Value Ref Range     ABO TYPE AB       Rh TYPE POS     Prepare RBC: 2 Units   Result Value Ref Range     PRODUCT CODE J0350O64       Unit Number [id number]       Unit ABO A       Unit RH POS       XM INTEP COMP       Dispense Status TR       Blood Expiration Date 5/23/2025 11:59:00 PM EDT       PRODUCT BLOOD TYPE 6200       UNIT VOLUME 350       PRODUCT CODE F2639F73       Unit Number [id number]       Unit ABO A       Unit RH POS       XM INTEP COMP       Dispense Status IS       Blood Expiration Date 5/23/2025 11:59:00 PM EDT       PRODUCT BLOOD TYPE 6200       UNIT VOLUME 350     Sars-CoV-2, Influenza A/B and RSV PCR   Result Value Ref Range     Coronavirus 2019, PCR Not Detected Not Detected     Flu A Result Not Detected Not Detected     Flu B Result Not Detected Not Detected     RSV PCR Not Detected Not Detected   Legionella Antigen, Urine     Specimen: Clean Catch/Voided; Urine   Result Value Ref Range     L. pneumophila Urine Ag Negative Negative   Streptococcus pneumoniae Antigen, Urine     Specimen: Clean Catch/Voided; Urine   Result Value Ref Range     Streptococcus pneumoniae Ag, Urine Negative Negative   POCT GLUCOSE   Result Value Ref Range     POCT Glucose 279 (H) 74 - 99 mg/dL   POCT GLUCOSE   Result Value Ref Range     POCT Glucose 227 (H) 74 - 99 mg/dL   Hemoglobin and hematocrit, blood   Result Value Ref Range     Hemoglobin 8.8 (L) 13.5 - 17.5 g/dL     Hematocrit 28.0 (L) 41.0 - 52.0 %   CBC   Result Value Ref Range     WBC 8.2 4.4 - 11.3 x10*3/uL     nRBC 0.4 (H) 0.0 - 0.0 /100 WBCs     RBC 2.89 (L) 4.50 - 5.90 x10*6/uL     Hemoglobin 8.8 (L) 13.5 - 17.5 g/dL     Hematocrit 28.0 (L) 41.0 - 52.0 %     MCV 97 80 - 100 fL     MCH 30.4 26.0 - 34.0 pg     MCHC 31.4 (L) 32.0 - 36.0 g/dL     RDW 16.9 (H) 11.5 - 14.5 %     Platelets 211 150 - 450 x10*3/uL   Basic metabolic panel   Result Value Ref Range      Glucose 243 (H) 74 - 99 mg/dL     Sodium 137 136 - 145 mmol/L     Potassium 3.9 3.5 - 5.3 mmol/L     Chloride 99 98 - 107 mmol/L     Bicarbonate 29 21 - 32 mmol/L     Anion Gap 13 10 - 20 mmol/L     Urea Nitrogen 56 (H) 6 - 23 mg/dL     Creatinine 1.67 (H) 0.50 - 1.30 mg/dL     eGFR 38 (L) >60 mL/min/1.73m*2     Calcium 9.1 8.6 - 10.3 mg/dL   Protime-INR   Result Value Ref Range     Protime 16.5 (H) 9.8 - 12.4 seconds     INR 1.5 (H) 0.9 - 1.1   POCT GLUCOSE   Result Value Ref Range     POCT Glucose 227 (H) 74 - 99 mg/dL      XR chest 1 view  Result Date: 5/3/2025  Interpreted By:  Cleve Cardenas, STUDY: XR CHEST 1 VIEW; 5/3/2025 2:02 pm   INDICATION: Signs/Symptoms:anemia.   COMPARISON: Chest x-ray 12/07/2024   ACCESSION NUMBER(S): [id number]   ORDERING CLINICIAN: SUMAN HENRIQUEZ   TECHNIQUE: 1 view of the chest was performed.   FINDINGS: Indeterminate bilateral lower lobe nodular infiltrates could be infectious. No pleural effusion. No pneumothorax.  The cardiomediastinal silhouette is within normal limits. Bilateral shoulder joint degenerative changes.        1. New scattered bilateral lower lobe predominant nodular infiltrates could be infectious. Finding could be further assessed by dedicated chest CT scan.   Signed by: Cleve Cardenas 5/3/2025 2:11 PM Dictation workstation:   [id number]        Assessment & Plan  Anemia, unspecified type     Supratherapeutic INR     Pneumonia     Productive cough     Shortness of breath     Generalized weakness        Saul Asif is a 89 y.o. male presents to the ED from facility for evaluation of low hemoglobin level.  Hemoglobin level 6.3 this morning on lab work at facility.  Patient reports extreme fatigue over the past 2 days.  Also notes dark stools however denies noticing any blood in his stools.  Denies any blood in his urine or coughing up any blood.  Has had blood transfusions in the past.  Reports some generalized weakness over the past couple days and generally not  feeling very well.  Patient's family also notes shortness of breath and productive cough over the past week, granddaughter stating his breathing sounded horrible last week and when she was visiting him.  States the facility did a chest x-ray at that time which was unremarkable.      CODE STATUS: DNR, DNI     #Acute on chronic anemia  #Supratherapeutic INR  #Atrial fibrillation on Coumadin  #Generalized weakness/fatigue  Admit for observation with telemetry monitoring  -Currently taking 2.5 mg Coumadin daily for history of atrial fibrillation.  Patient does report dark stools.  PT level greater than 100, unable to calculate INR level.  2 units RBCs ordered in the ED  Repeat H&H ordered after transfusion, continue to trend, transfuse as needed for hemoglobin less than 7  5 mg IV vitamin K ordered  Stool occult blood ordered  40 mg IV Protonix daily  Monitor daily PT/INR level  Q 4 vitals  CBC, BMP in the a.m.  Cardiac/diabetic/renal diet  Fall precautions, PT/OT for evaluation and treatment  Social work consult for discharge planning     #Suspect pneumonia  #Productive cough  #Intermittent shortness of breath, worse over the past week  Chest x-ray shows new scattered bilateral lower lobe predominant nodular infiltrates  Patient afebrile, no leukocytosis.  Urine pneumonia antigens ordered, procalcitonin ordered  IV Rocephin and IV azithromycin ordered  Cough medication, breathing treatments, oxygen as needed  COVID, flu, RSV ordered     #Diabetes mellitus  Hold home oral antidiabetic medications  Sliding scale insulin  ACHS Accu-Cheks  Tresiba 8 units daily     Continue home medications as appropriate     Chronic conditions:  A-fib, CKD, HLD, hypothyroidism, prostate cancer, aortic valve stenosis, type II DM     #DVT prophylaxis  Hold home oral anticoagulation  SCDs, ambulation as tolerated           had concerns including Weakness, Gen (Pt BIBA with c/o generalized weakness and fatigue x2 days. Pt had labs drawn at [facility] which revealed a Hgb of 6.3, pt endorses dark stools. Alert and [place]. Pt is also on coumadin. Denies CP or SOB. ).        Problem List Items Addressed This Visit         Pneumonia     * (Principal) Anemia, unspecified type - Primary      Other Visit Diagnoses           On warfarin for atrial fibrillation (Multi)           Dark stools           Elevated protime                    HPI         Weakness, Gen     Additional comments: Pt BIBA with c/o generalized weakness and fatigue x2 days. [facility] which revealed a Hgb of 6.3, pt endorses dark stools. Alert and [place]. Pt is also on coumadin. Denies CP or SOB.             Last edited by Deepthi Olivia RN on 5/3/2025  1:40 PM.             Problem List as of 5/4/2025 Reviewed: 2/27/2025 10:45 AM by Matthew Tubbs MD        Abdominal bloating     Abdominal pain     Abnormal chest xray     Acquired hypothyroidism     Asymptomatic cholelithiasis     Atrial fibrillation (Multi)     Kraus's esophagus     Diabetes mellitus (Multi)     Gastric ulcer     Helicobacter pylori (H. pylori) infection     HH (hiatus hernia)     History of colon polyps     Hyperlipidemia     Iron deficiency anemia     Irritable bowel syndrome     Leg joint pain     Severe aortic stenosis     Last Assessment & Plan 2/27/2025 Office Visit Written 2/27/2025 10:49 AM by Matthew Tubbs MD   2/27/25 echocqrdiogram svere aortic stenosis and moderate AI LVEF = 65-70%, severe MAC with moderate MR,  Tr with midly elevated RVSP (reviewed today)           Muscular pain     Personal history of Helicobacter infection     Pneumonia     Presence of artificial knee joint     Right hip pain     Tubular adenoma     Stage 4 chronic kidney disease (Multi)     Paroxysmal atrial fibrillation (Multi)     Prostate cancer (Multi)     Squamous cell cancer of skin of left cheek     Secondary hyperparathyroidism of renal origin (Multi)     RBBB     * (Principal) Anemia, unspecified type      Supratherapeutic INR     Productive cough     Shortness of breath     Generalized weakness              Active Ambulatory Problems     Diagnosis Date Noted    Abdominal bloating 10/12/2023    Abdominal pain 10/12/2023    Abnormal chest xray 10/12/2023    Acquired hypothyroidism 10/12/2023    Asymptomatic cholelithiasis 10/12/2023    Atrial fibrillation (Multi) 10/12/2023    Kraus's esophagus 10/12/2023    Diabetes mellitus (Multi) 10/12/2023    Gastric ulcer 10/12/2023    Helicobacter pylori (H. pylori) infection 10/12/2023    HH (hiatus hernia) 10/12/2023    History of colon polyps 10/12/2023    Hyperlipidemia 10/12/2023    Iron deficiency anemia 10/12/2023    Irritable bowel syndrome 10/12/2023    Leg joint pain 10/12/2023    Severe aortic stenosis 10/12/2023    Muscular pain 10/12/2023    Personal history of Helicobacter infection 10/12/2023    Pneumonia 10/12/2023    Presence of artificial knee joint 10/12/2023    Right hip pain 10/12/2023    Tubular adenoma 10/12/2023    Stage 4 chronic kidney disease (Multi) 10/18/2023    Paroxysmal atrial fibrillation (Multi) 02/15/2024    Prostate cancer (Multi) 11/09/2017    Squamous cell cancer of skin of left cheek 11/13/2017    Secondary hyperparathyroidism of renal origin (Multi) 04/23/2024    RBBB 02/27/2025           Resolved Ambulatory Problems     Diagnosis Date Noted    Obesity, Class I, BMI 30.0-34.9 (see actual BMI) 10/12/2023    COVID-19 12/27/2024    COVID 12/28/2024      No Additional Past Medical History                    Active Orders   Microbiology     Occult Blood, Stool       Frequency: Once       Number of Occurrences: 1 Occurrences   Lab     CBC       Frequency: AM draw       Number of Occurrences: 1 Occurrences     Comprehensive Metabolic Panel       Frequency: AM draw       Number of Occurrences: 1 Occurrences     Protime-INR       Frequency: AM draw       Number of Occurrences: 3 Days   Diet     Adult diet Cardiac, Renal, Consistent Carb; CCD 75  gm/meal; 70 gm fat; 2 - 3 grams Sodium; Potassium Restricted 2 gm (50mEq)       Frequency: Effective now       Number of Occurrences: Until Specified   Nursing     Activity (specify) Ambulate; Ambulate with Assistance; As Tolerated       Frequency: Until discontinued       Number of Occurrences: Until Specified     Apply sequential compression device       Frequency: Until discontinued       Number of Occurrences: Until Specified     Check pulse oximetry       Frequency: q8h       Number of Occurrences: Until Specified     Glucose 10-70 mg/dL & CONSCIOUS- Give 15 Grams of Carbohydrates and repeat until blood glucose level reaches 100 mg/dL or greater.       Frequency: Until discontinued       Number of Occurrences: Until Specified       Order Comments: Select 1 of the following:  -1 cup skim milk  -3 or 4 glucose tablets  -4 ounces of fruit juice or regular soda.  Patient MUST be CONSCIOUS and able to eat or drink        Notify provider       Frequency: Until discontinued       Number of Occurrences: Until Specified     Notify provider (specify parameters)       Frequency: Until discontinued       Number of Occurrences: Until Specified       Order Comments: [place] Hypoglycemia interventions.        Notify provider (specify parameters)       Frequency: Until discontinued       Number of Occurrences: Until Specified       Order Comments: For blood glucose greater than 200 mg/dL twice over 24 hours.  Provider to consider Endocrine Consult.        Notify provider (specify parameters)       Frequency: Until discontinued       Number of Occurrences: Until Specified     Pain Assessment       Frequency: q4h       Number of Occurrences: Until Specified     Pulse Oximetry       Frequency: Until discontinued       Number of Occurrences: Until Specified     Vital Signs       Frequency: Per unit standards       Number of Occurrences: Until Specified     Vital Signs       Frequency: q4h       Number of Occurrences: Until  Specified     Vital signs for transfusion       Frequency: Per unit standards       Number of Occurrences: Until Specified       Order Comments: Document required Vital Signs: Immediately prior to transfusion, within the first 15 minutes of transfusion, 45-60 minutes after infusion starts, and within 1 hour of completion of transfusion.        Vital signs for transfusion       Frequency: Per unit standards       Number of Occurrences: Until Specified       Order Comments: Document required Vital Signs: Immediately prior to transfusion, within the first 15 minutes of transfusion, 45-60 minutes after infusion starts, and within 1 hour of completion of transfusion.        Weight on admission       Frequency: Daily       Number of Occurrences: Until Specified   Code Status     DNR and No Intubation       Frequency: Continuous       Number of Occurrences: Until Specified   Consult     Inpatient consult to Social Work and TCC       Frequency: Once       Number of Occurrences: 1 Occurrences   Nourishments     May Participate in Room Service       Frequency: Once       Number of Occurrences: 1 Occurrences   OT     OT eval and treat       Frequency: Until therapy completed       Number of Occurrences: 1 Occurrences   PT     PT eval and treat       Frequency: Until therapy completed       Number of Occurrences: 1 Occurrences   Respiratory Care     Airway Clearance Techniques Device/modality: EzPap       Frequency: TID       Number of Occurrences: Until Specified     Respiratory care eval and treat       Frequency: Once       Number of Occurrences: 1 Occurrences       Order Comments: Triage 5  Due 5/6 0700      ECG     ECG 12 lead       Frequency: Once       Number of Occurrences: 1 Occurrences     Electrocardiogram, 12-lead PRN ACS symptoms       Frequency: PRN       Number of Occurrences: Until Specified       Order Comments: Notify provider if performed.      Precaution     Fall precautions       Frequency: Until  discontinued       Number of Occurrences: Until Specified   Point of Care Testing - Docked Device     POCT Glucose       Frequency: 4x daily - AC and at bedtime       Number of Occurrences: 3 Days       Order Comments: And PRN           POCT Glucose       Frequency: PRN       Number of Occurrences: Until Specified       Order Comments: PRN for hypoglycemia interventions instituted.  Retest blood glucose level every 15 minutes after every hypoglycemic intervention until blood glucose is greater than 100 mg/dL.         Telemetry     Telemetry monitoring - Floor only       Frequency: Until discontinued       Number of Occurrences: 3 Days   Medications     acetaminophen (Tylenol) oral liquid 650 mg       Linked Order: Or       Frequency: q4h PRN       Dose: 650 mg       Route: oral     acetaminophen (Tylenol) suppository 650 mg       Linked Order: Or       Frequency: q4h PRN       Dose: 650 mg       Route: rectal     acetaminophen (Tylenol) tablet 650 mg       Linked Order: Or       Frequency: q4h PRN       Dose: 650 mg       Route: oral     atorvastatin (Lipitor) tablet 10 mg       Frequency: Nightly       Dose: 10 mg       Route: oral     azithromycin (Zithromax) 500 mg in dextrose 5% 250 mL IV       Frequency: q24h       Dose: 500 mg       Route: intravenous     brimonidine (AlphaGAN) 0.2 % ophthalmic solution 1 drop       Frequency: BID       Dose: 1 drop       Route: Both Eyes     cefTRIAXone (Rocephin) 2 g in dextrose (iso) IV 50 mL       Frequency: q24h       Dose: 2 g       Route: intravenous     cholecalciferol (Vitamin D-3) tablet 25 mcg       Frequency: Daily       Dose: 25 mcg       Route: oral     dextrose 50 % injection 12.5 g       Frequency: q15 min PRN       Dose: 12.5 g       Route: intravenous     dextrose 50 % injection 25 g       Frequency: q15 min PRN       Dose: 25 g       Route: intravenous     docusate sodium (Colace) capsule 100 mg       Frequency: Nightly       Dose: 100 mg       Route: oral      ferrous sulfate tablet 325 mg       Frequency: Nightly       Dose: 1 tablet       Route: oral     glucagon (Glucagen) injection 1 mg       Frequency: q15 min PRN       Dose: 1 mg       Route: intramuscular     glucagon (Glucagen) injection 1 mg       Frequency: q15 min PRN       Dose: 1 mg       Route: intramuscular     guaiFENesin (Mucinex) 12 hr tablet 600 mg       Frequency: BID       Dose: 600 mg       Route: oral     insulin glargine (Lantus) injection 10 Units       Frequency: Nightly       Dose: 10 Units       Route: subcutaneous     insulin lispro injection 0-10 Units       Frequency: TID AC       Dose: 0-10 Units       Route: subcutaneous     ipratropium (Atrovent) 0.02 % nebulizer solution 0.5 mg       Frequency: TID       Dose: 0.5 mg       Route: nebulization     ipratropium-albuteroL (Duo-Neb) 0.5-2.5 mg/3 mL nebulizer solution 3 mL       Frequency: q2h PRN       Dose: 3 mL       Route: nebulization     levothyroxine (Synthroid, Levoxyl) tablet 100 mcg       Frequency: Daily       Dose: 100 mcg       Route: oral     melatonin tablet 5 mg       Frequency: Nightly PRN       Dose: 5 mg       Route: oral     oxygen (O2) therapy       Frequency: Continuous PRN - O2/gases       Route: inhalation     pantoprazole (Protonix) injection 40 mg       Frequency: Daily       Dose: 40 mg       Route: intravenous     torsemide (Demadex) tablet 20 mg       Frequency: Daily       Dose: 20 mg       Route: oral     traZODone (Desyrel) tablet 25 mg       Frequency: Nightly       Dose: 25 mg       Route: oral     warfarin (Coumadin) tablet 2.5 mg       Frequency: Daily       Dose: 2.5 mg       Route: oral       Order Comments: Ensure proper admin timing per warfarin policy.      Dietary Orders (From admission, onward)          Start     Ordered     05/03/25 1658   May Participate in Room Service  ( ROOM SERVICE MAY PARTICIPATE)  Once        Question:  .  Answer:  Yes    05/03/25 1657 05/03/25 1645   Adult diet  "Cardiac, Renal, Consistent Carb; CCD 75 gm/meal; 70 gm fat; 2 - 3 grams Sodium; Potassium Restricted 2 gm (50mEq)  Diet effective now        Question Answer Comment   Diet type Cardiac     Diet type Renal     Diet type Consistent Carb     Carb diet selection: CCD 75 gm/meal     Fat restriction: 70 gm fat     Sodium restriction: 2 - 3 grams Sodium     Potassium restriction: Potassium Restricted 2 gm (50mEq)         05/03/25 1655                       89 yrs@  ADMITDTTM@  Dark stools [R19.5]  Elevated protime [R79.1]  Anemia, unspecified type [D64.9]  On warfarin for atrial fibrillation (Multi) [I48.91, Z79.01]  [unfilled]  Weight: 81.6 kg (180 lb)     Vitals          Vitals:     05/02/25 2100 05/03/25 1330 05/03/25 1341 05/03/25 1345   BP: 118/85 111/72 111/72     Pulse: 82 86 88 87   Resp: 16   18 (!) 25   Temp: 36.5 °C (97.7 °F)   36.4 °C (97.5 °F)     TempSrc: Temporal   Temporal     SpO2: 97% 94% (!) 92% 96%   Weight:     81.6 kg (180 lb)     Height:     1.753 m (5' 9\")       05/03/25 1400 05/03/25 1415 05/03/25 1430 05/03/25 1445   BP:     133/58     Pulse: 88 84 79 80   Resp: (!) 22 20 19 19   Temp:           TempSrc:           SpO2: 96% 98% 95% 95%   Weight:           Height:             05/03/25 1500 05/03/25 1515 05/03/25 1530 05/03/25 1545   BP: 115/57         Pulse: 81 87 88 85   Resp: 20 (!) 24 (!) 23 (!) 25   Temp:           TempSrc:           SpO2: 97% 97% 97% 96%   Weight:           Height:             05/03/25 1600 05/03/25 1642 05/03/25 1645 05/03/25 1826   BP:   147/67 147/67 125/60   Pulse: 92 88 88 77   Resp: (!) 33 16   18   Temp:   36.4 °C (97.5 °F) 36.4 °C (97.5 °F) 36.3 °C (97.3 °F)   TempSrc:   Temporal   Temporal   SpO2: 96%   95% 94%   Weight:   81.6 kg (180 lb)       Height:   1.753 m (5' 9.02\")         05/03/25 1841 05/03/25 1926 05/03/25 2041 05/04/25 0042   BP: 126/60 118/85 118/85 122/58   Pulse: 98  Comment: sitting upright for dinner 82 82 71   Resp: 18 16 16 16   Temp: 36.3 °C (97.3 " °F) 36.5 °C (97.7 °F) 36.5 °C (97.7 °F) 35.8 °C (96.4 °F)   TempSrc: Temporal Temporal Temporal Temporal   SpO2: 94% 97% 97% 93%   Weight:           Height:             05/04/25 0056 05/04/25 0142 05/04/25 0345 05/04/25 0402   BP: (!) 111/6 109/59 124/60 124/60   Pulse: 73 78 72 72   Resp: 18 18 16 16   Temp: 36.3 °C (97.3 °F) 35.5 °C (95.9 °F) 36 °C (96.8 °F) 36 °C (96.8 °F)   TempSrc: Temporal Temporal Temporal Temporal   SpO2: 97% 96% 93% 93%   Weight:           Height:             05/04/25 0637 05/04/25 1058 05/04/25 1155   BP: 131/63 108/56     Pulse: 72 72     Resp: 16       Temp: 36.2 °C (97.2 °F) 36 °C (96.8 °F)     TempSrc: Temporal       SpO2: 95% 94% 95%   Weight:         Height:                        Chief Complaint    Weakness, Gen            Patient seen resting in bed with head of bed elevated, no s/s or c/o acute difficulties at this time.  Vital signs for last 24 hours Temp:  [35.5 °C (95.9 °F)-36.5 °C (97.7 °F)] 36 °C (96.8 °F)  Heart Rate:  [71-98] 72  Resp:  [16-33] 16  BP: (108-147)/(6-85) 108/56    No intake/output data recorded.  Patient still requiring frequent cardiac and SPO2 monitoring. Continue aggressive pulmonary hygiene and oral hygiene. Off loading as tolerated for skin integrity. Medications and labs reviewed-    Patient recently received an antibiotic (last 12 hours)         None                    Results for orders placed or performed during the hospital encounter of 05/03/25 (from the past 96 hours)   CBC and Auto Differential   Result Value Ref Range     WBC 10.0 4.4 - 11.3 x10*3/uL     nRBC 0.2 (H) 0.0 - 0.0 /100 WBCs     RBC 2.03 (L) 4.50 - 5.90 x10*6/uL     Hemoglobin 6.3 (LL) 13.5 - 17.5 g/dL     Hematocrit 20.8 (L) 41.0 - 52.0 %      (H) 80 - 100 fL     MCH 31.0 26.0 - 34.0 pg     MCHC 30.3 (L) 32.0 - 36.0 g/dL     RDW 15.3 (H) 11.5 - 14.5 %     Platelets 241 150 - 450 x10*3/uL     Neutrophils % 78.6 40.0 - 80.0 %     Immature Granulocytes %, Automated 0.7 0.0 - 0.9  %     Lymphocytes % 11.9 13.0 - 44.0 %     Monocytes % 6.2 2.0 - 10.0 %     Eosinophils % 2.2 0.0 - 6.0 %     Basophils % 0.4 0.0 - 2.0 %     Neutrophils Absolute 7.89 (H) 1.60 - 5.50 x10*3/uL     Immature Granulocytes Absolute, Automated 0.07 0.00 - 0.50 x10*3/uL     Lymphocytes Absolute 1.19 0.80 - 3.00 x10*3/uL     Monocytes Absolute 0.62 0.05 - 0.80 x10*3/uL     Eosinophils Absolute 0.22 0.00 - 0.40 x10*3/uL     Basophils Absolute 0.04 0.00 - 0.10 x10*3/uL   Comprehensive metabolic panel   Result Value Ref Range     Glucose 240 (H) 74 - 99 mg/dL     Sodium 139 136 - 145 mmol/L     Potassium 4.3 3.5 - 5.3 mmol/L     Chloride 103 98 - 107 mmol/L     Bicarbonate 28 21 - 32 mmol/L     Anion Gap 12 10 - 20 mmol/L     Urea Nitrogen 55 (H) 6 - 23 mg/dL     Creatinine 1.60 (H) 0.50 - 1.30 mg/dL     eGFR 40 (L) >60 mL/min/1.73m*2     Calcium 9.6 8.6 - 10.3 mg/dL     Albumin 3.6 3.4 - 5.0 g/dL     Alkaline Phosphatase 65 33 - 136 U/L     Total Protein 6.3 (L) 6.4 - 8.2 g/dL     AST 17 9 - 39 U/L     Bilirubin, Total 0.3 0.0 - 1.2 mg/dL     ALT 20 10 - 52 U/L   Protime-INR   Result Value Ref Range     Protime >100.0 (HH) 9.8 - 12.4 seconds     INR       Troponin I, High Sensitivity   Result Value Ref Range     Troponin I, High Sensitivity 13 0 - 20 ng/L   Type and screen   Result Value Ref Range     ABO TYPE AB       Rh TYPE POS       ANTIBODY SCREEN NEG     Magnesium   Result Value Ref Range     Magnesium 1.93 1.60 - 2.40 mg/dL   Verify ABO/Rh Group Test (VERAB)   Result Value Ref Range     ABO TYPE AB       Rh TYPE POS     Prepare RBC: 2 Units   Result Value Ref Range     PRODUCT CODE Y6901G05       Unit Number [id number]       Unit ABO A       Unit RH POS       XM INTEP COMP       Dispense Status TR       Blood Expiration Date 5/23/2025 11:59:00 PM EDT       PRODUCT BLOOD TYPE 6200       UNIT VOLUME 350       PRODUCT CODE Q7439Z15       Unit Number [id number]       Unit ABO A       Unit RH POS       XM INTEP  COMP       Dispense Status IS       Blood Expiration Date 5/23/2025 11:59:00 PM EDT       PRODUCT BLOOD TYPE 6200       UNIT VOLUME 350     Sars-CoV-2, Influenza A/B and RSV PCR   Result Value Ref Range     Coronavirus 2019, PCR Not Detected Not Detected     Flu A Result Not Detected Not Detected     Flu B Result Not Detected Not Detected     RSV PCR Not Detected Not Detected   Legionella Antigen, Urine     Specimen: Clean Catch/Voided; Urine   Result Value Ref Range     L. pneumophila Urine Ag Negative Negative   Streptococcus pneumoniae Antigen, Urine     Specimen: Clean Catch/Voided; Urine   Result Value Ref Range     Streptococcus pneumoniae Ag, Urine Negative Negative   POCT GLUCOSE   Result Value Ref Range     POCT Glucose 279 (H) 74 - 99 mg/dL   POCT GLUCOSE   Result Value Ref Range     POCT Glucose 227 (H) 74 - 99 mg/dL   Hemoglobin and hematocrit, blood   Result Value Ref Range     Hemoglobin 8.8 (L) 13.5 - 17.5 g/dL     Hematocrit 28.0 (L) 41.0 - 52.0 %   CBC   Result Value Ref Range     WBC 8.2 4.4 - 11.3 x10*3/uL     nRBC 0.4 (H) 0.0 - 0.0 /100 WBCs     RBC 2.89 (L) 4.50 - 5.90 x10*6/uL     Hemoglobin 8.8 (L) 13.5 - 17.5 g/dL     Hematocrit 28.0 (L) 41.0 - 52.0 %     MCV 97 80 - 100 fL     MCH 30.4 26.0 - 34.0 pg     MCHC 31.4 (L) 32.0 - 36.0 g/dL     RDW 16.9 (H) 11.5 - 14.5 %     Platelets 211 150 - 450 x10*3/uL   Basic metabolic panel   Result Value Ref Range     Glucose 243 (H) 74 - 99 mg/dL     Sodium 137 136 - 145 mmol/L     Potassium 3.9 3.5 - 5.3 mmol/L     Chloride 99 98 - 107 mmol/L     Bicarbonate 29 21 - 32 mmol/L     Anion Gap 13 10 - 20 mmol/L     Urea Nitrogen 56 (H) 6 - 23 mg/dL     Creatinine 1.67 (H) 0.50 - 1.30 mg/dL     eGFR 38 (L) >60 mL/min/1.73m*2     Calcium 9.1 8.6 - 10.3 mg/dL   Protime-INR   Result Value Ref Range     Protime 16.5 (H) 9.8 - 12.4 seconds     INR 1.5 (H) 0.9 - 1.1   POCT GLUCOSE   Result Value Ref Range     POCT Glucose 227 (H) 74 - 99 mg/dL           Patient  fully evaluated 05/03, thorough record review performed of previous labs and notes from prior encounters. Plan discussed with interdisciplinary team, consults placed, appreciate input. Will continue current and repeat labs in the AM.          Discharge planning discussed with patient and care team. Therapy evaluations ordered. Patient aware and agreeable to current plan, continue plan as above.  Pulmonology consultation obtained, appreciate input.     I spent a total of 75 minutes on the date of the service which included preparing to see the patient, face-to-face patient care, completing clinical documentation, obtaining and/or reviewing separately obtained history, performing a medically appropriate examination, counseling and educating the patient/family/caregiver, ordering medications, tests, or procedures, communicating with other HCPs (not separately reported), independently interpreting results (not separately reported), communicating results to the patient/family/caregiver, and care coordination (not separately reported).         Ngozi Lea                 Revision History         Objective     Last Recorded Vitals  BP 96/60 (BP Location: Right arm, Patient Position: Lying)   Pulse 97   Temp 36 °C (96.8 °F) (Temporal)   Resp 16   Wt 78.5 kg (173 lb 1 oz)   SpO2 93%   Intake/Output last 3 Shifts:    Intake/Output Summary (Last 24 hours) at 5/6/2025 1214  Last data filed at 5/6/2025 0527  Gross per 24 hour   Intake 1072 ml   Output 1750 ml   Net -678 ml       Admission Weight  Weight: 81.6 kg (180 lb) (05/03/25 1341)    Daily Weight  05/06/25 : 78.5 kg (173 lb 1 oz)    Image Results  ECG 12 lead  Sinus or ectopic atrial rhythm  Prolonged MO interval  Right bundle branch block      Physical Exam  Vitals and nursing note reviewed.       General appearance: Not in pain or distress, in no respiratory distress. Weakness noted    HEENT: Atraumatic/normocephalic, EOMI, SABRA, pharynx clear, moist mucosa,  redness of the uvula appreciated,   Neck: Supple, no jugular venous distension, lymphadenopathy, thyromegaly or carotid bruits  Chest: Rhonchi  Cardiovascular: Normal S1, S2, regular rate and rhythm, no murmur, rub or gallop  Abdomen: Normal sounds present, soft, lax with no tenderness, no hepatosplenomegaly, and no masses  Extremities: No edema. Pulses are equally present.   Skin: intact, no rashes   Neurologic: Alert and oriented x 3, No focal deficit        Assessment & Plan  Anemia, unspecified type    Supratherapeutic INR    Pneumonia (Resolved: 5/6/2025)    Productive cough    Shortness of breath    Generalized weakness    Patient evaluated May 5. Patient anemic at 8.6. Overall, anemia is improving. 2 units of PRBCs ordered. PT down trending from >100, to 16.5 today. GI consult placed, would appreciate input. Stool occult blood pending. Hyperglycemic at 216. Continue to monitor H/H.     Discharge planning discussed with patient and care team. Therapy evaluations ordered. Patient aware and agreeable to current plan, continue plan as above.     I spent a total of 75 minutes on the date of the service which included preparing to see the patient, face-to-face patient care, completing clinical documentation, obtaining and/or reviewing separately obtained history, performing a medically appropriate examination, counseling and educating the patient/family/caregiver, ordering medications, tests, or procedures, communicating with other HCPs (not separately reported), independently interpreting results (not separately reported), communicating results to the patient/family/caregiver, and care coordination (not separately reported).     Patient fully evaluated  05/06  for    Problem List Items Addressed This Visit       RESOLVED: Pneumonia    * (Principal) Anemia, unspecified type - Primary     Other Visit Diagnoses         On warfarin for atrial fibrillation (Multi)          Dark stools          Elevated protime               Patient seen resting in bed with head of bed elevated, no s/s or c/o acute difficulties at this time.  Vital signs for last 24 hours Temp:  [35.8 °C (96.4 °F)-36 °C (96.8 °F)] 36 °C (96.8 °F)  Heart Rate:  [84-97] 97  Resp:  [16-18] 16  BP: ()/(57-77) 96/60    No intake/output data recorded.  Patient still requiring frequent cardiac and SPO2 monitoring. Continue aggressive pulmonary hygiene and oral hygiene. Off loading as tolerated for skin integrity. Medications and labs reviewed-   Results for orders placed or performed during the hospital encounter of 05/03/25 (from the past 24 hours)   POCT GLUCOSE   Result Value Ref Range    POCT Glucose 356 (H) 74 - 99 mg/dL   POCT GLUCOSE   Result Value Ref Range    POCT Glucose 375 (H) 74 - 99 mg/dL   POCT GLUCOSE   Result Value Ref Range    POCT Glucose 259 (H) 74 - 99 mg/dL   POCT GLUCOSE   Result Value Ref Range    POCT Glucose 175 (H) 74 - 99 mg/dL   Protime-INR   Result Value Ref Range    Protime 16.1 (H) 9.8 - 12.4 seconds    INR 1.5 (H) 0.9 - 1.1   Comprehensive Metabolic Panel   Result Value Ref Range    Glucose 179 (H) 74 - 99 mg/dL    Sodium 136 136 - 145 mmol/L    Potassium 3.6 3.5 - 5.3 mmol/L    Chloride 97 (L) 98 - 107 mmol/L    Bicarbonate 30 21 - 32 mmol/L    Anion Gap 13 10 - 20 mmol/L    Urea Nitrogen 35 (H) 6 - 23 mg/dL    Creatinine 1.67 (H) 0.50 - 1.30 mg/dL    eGFR 38 (L) >60 mL/min/1.73m*2    Calcium 8.8 8.6 - 10.3 mg/dL    Albumin 3.5 3.4 - 5.0 g/dL    Alkaline Phosphatase 64 33 - 136 U/L    Total Protein 6.4 6.4 - 8.2 g/dL    AST 22 9 - 39 U/L    Bilirubin, Total 0.5 0.0 - 1.2 mg/dL    ALT 19 10 - 52 U/L   CBC and Auto Differential   Result Value Ref Range    WBC 10.6 4.4 - 11.3 x10*3/uL    nRBC 0.0 0.0 - 0.0 /100 WBCs    RBC 3.13 (L) 4.50 - 5.90 x10*6/uL    Hemoglobin 9.3 (L) 13.5 - 17.5 g/dL    Hematocrit 30.6 (L) 41.0 - 52.0 %    MCV 98 80 - 100 fL    MCH 29.7 26.0 - 34.0 pg    MCHC 30.4 (L) 32.0 - 36.0 g/dL    RDW 17.0 (H) 11.5 - 14.5  %    Platelets 255 150 - 450 x10*3/uL    Neutrophils % 77.9 40.0 - 80.0 %    Immature Granulocytes %, Automated 0.8 0.0 - 0.9 %    Lymphocytes % 9.6 13.0 - 44.0 %    Monocytes % 9.9 2.0 - 10.0 %    Eosinophils % 1.5 0.0 - 6.0 %    Basophils % 0.3 0.0 - 2.0 %    Neutrophils Absolute 8.25 (H) 1.60 - 5.50 x10*3/uL    Immature Granulocytes Absolute, Automated 0.09 0.00 - 0.50 x10*3/uL    Lymphocytes Absolute 1.02 0.80 - 3.00 x10*3/uL    Monocytes Absolute 1.05 (H) 0.05 - 0.80 x10*3/uL    Eosinophils Absolute 0.16 0.00 - 0.40 x10*3/uL    Basophils Absolute 0.03 0.00 - 0.10 x10*3/uL   POCT GLUCOSE   Result Value Ref Range    POCT Glucose 326 (H) 74 - 99 mg/dL      Patient recently received an antibiotic (last 12 hours)       None           Plan discussed with interdisciplinary team,  seen  by GI with plan -   Suspect this was exacerbated by his supratherapeutic INR  -Presented with increased fatigue, hemoglobin 6.3  -Continue holding Coumadin, continue IV PPI, monitor Hgb and transfuse as necessary  Appreciate input, will continue current, continue IV antibiotics  Azithromycin, Rocephin, IV protonix, INR @ 1.5, hemoglobin @ 9.3 today. Patient tolerating diet, abdomen soft, nontender, bowel sounds x4, unaware of last BM per patient, on oral iron, will order bowel regimen scheduled and PRN. Up in chair 3 x day, ambulate as tolerated/indicated. Will continue to monitor overnight, monitor for acute bleeding, history of cardiac issues, transfuse to keep hemoglobin >8. Will continue current and repeat labs in the AM.     Discharge planning discussed with patient and care team. Therapy evaluations ordered. Anticipate return to facility SNF at discharge. Patient aware and agreeable to current plan, continue plan as above.     I spent a total of 60 minutes on the date of the service which included preparing to see the patient, face-to-face patient care, completing clinical documentation, obtaining and/or reviewing separately  obtained history, performing a medically appropriate examination, counseling and educating the patient/family/caregiver, ordering medications, tests, or procedures, communicating with other HCPs (not separately reported), independently interpreting results (not separately reported), communicating results to the patient/family/caregiver, and care coordination (not separately reported).

## 2025-05-06 NOTE — CARE PLAN
The patient's goals for the shift include rest    The clinical goals for the shift include pt will remain safe and free of injury    Over the shift, the patient did not make progress toward the following goals. Barriers to progression include [place]. Recommendations to address these barriers include continue to monitor.

## 2025-05-06 NOTE — DOCUMENTATION CLARIFICATION NOTE
"    PATIENT:               MICHELA SAHU  ACCT #:                  [id number]  MRN:                       [id number]  :                       3/30/1933  ADMIT DATE:       5/3/2025 1:27 PM  DISCH DATE:  RESPONDING PROVIDER #:        [id number]          PROVIDER RESPONSE TEXT:    Anemia/GIB due to or enhanced by coumadin    CDI QUERY TEXT:    Clarification        Instruction:    Based on your assessment of the patient and the clinical information, please provide the requested documentation by clicking on the appropriate radio button and enter any additional information if prompted.    Question: Please further clarify if a relationship exists between anemia/GIB and Coumadin.    When answering this query, please exercise your independent professional judgment. The fact that a question is being asked, does not imply that any particular answer is desired or expected.    The patient's clinical indicators include:  Clinical Information: 5/3/25 ED: \"Anemia, unspecified type.\"    Clinical Indicators:  5/3/25 ER: \"Patient reports extreme fatigue over the last day and a half. Has had dark stools but denies bloody stools. Pro time greater than 100, unable to calculate INR.\"  25 GI: \" Consult for GIB....Acute on chronic anemia-Suspect this was exacerbated by his supratherapeutic INR  -Presented with increased fatigue, hemoglobin 6.3 \"    Treatment: Holding coumadin, vitamin K, Serial H/H's.  Gi consult, Transfusion.    Risk Factors: On coumadin for atrial fibrillation. Hx of CKD 4. Dark stools--possible GIB.  Options provided:  -- Anemia/GIB due to or enhanced by coumadin  -- Anemia/GIB not due to or enhanced by coumadin  -- Other - I will add my own diagnosis  -- Refer to Clinical Documentation Reviewer    Query created by: Elke Hodge on 2025 2:38 PM      Electronically signed by:  EDISON HUNG MD 2025 4:01 PM          "

## 2025-05-06 NOTE — PROGRESS NOTES
Saul Asif is a 89 y.o. male on day 2 of admission presenting with Anemia, unspecified type.      Subjective   Doing well, denies any abdominal pain.  Has not noticed any bleeding but has not had a bowel movement this been in the hospital.     Objective     Last Recorded Vitals  /77 (BP Location: Right arm, Patient Position: Lying)   Pulse 91   Temp 36 °C (96.8 °F) (Temporal)   Resp 18   Wt 78.5 kg (173 lb 1 oz)   SpO2 94%   Intake/Output last 3 Shifts:    Intake/Output Summary (Last 24 hours) at 5/6/2025 0837  Last data filed at 5/6/2025 0527  Gross per 24 hour   Intake 1072 ml   Output 1950 ml   Net -878 ml     Admission Weight  Weight: 81.6 kg (180 lb) (05/03/25 1341)    Daily Weight  05/06/25 : 78.5 kg (173 lb 1 oz)    Image Results  ECG 12 lead  Sinus or ectopic atrial rhythm  Prolonged SD interval  Right bundle branch block      Physical Exam  Vitals and nursing note reviewed.   Constitutional:       Appearance: Normal appearance.   HENT:      Mouth/Throat:      Mouth: Mucous membranes are moist.   Cardiovascular:      Rate and Rhythm: Normal rate.   Pulmonary:      Effort: Pulmonary effort is normal.      Breath sounds: Normal breath sounds. No wheezing.   Abdominal:      General: Abdomen is flat. There is no distension.      Palpations: Abdomen is soft.      Tenderness: There is no abdominal tenderness.   Musculoskeletal:      Right lower leg: No edema.      Left lower leg: No edema.   Skin:     General: Skin is warm and dry.   Neurological:      General: No focal deficit present.      Mental Status: He is alert and oriented to person, place, and time.       Assessment & Plan  Anemia, unspecified type    Saul Asif is a 89 y.o. male with a past medical history of A-fib on Coumadin, CKD, hypothyroidism, AAS, type 2 diabetes who presented on 5/3 for low hemoglobin of 6.3.  GI consulted for GI bleed.     Acute on chronic anemia  Supratherapeutic INR, resolved  -Suspect this was exacerbated by his  supratherapeutic INR  -Presented with increased fatigue, hemoglobin 6.3  -Continue holding Coumadin, continue IV PPI, monitor Hgb and transfuse as necessary  - Plan for EGD and colonoscopy 5/7.  Bowel prep ordered     Silverio Melendez MD  PGY-1, Internal Medicine

## 2025-05-06 NOTE — CARE PLAN
The patient's goals for the shift include rest    The clinical goals for the shift include pt will remain safe and free of injury

## 2025-05-07 ENCOUNTER — TELEPHONE (OUTPATIENT)
Dept: GASTROENTEROLOGY | Facility: CLINIC | Age: OVER 89
End: 2025-05-07

## 2025-05-07 ENCOUNTER — ANESTHESIA (OUTPATIENT)
Dept: GASTROENTEROLOGY | Facility: HOSPITAL | Age: OVER 89
End: 2025-05-07
Payer: MEDICARE

## 2025-05-07 ENCOUNTER — APPOINTMENT (OUTPATIENT)
Dept: GASTROENTEROLOGY | Facility: HOSPITAL | Age: OVER 89
DRG: 377 | End: 2025-05-07
Payer: MEDICARE

## 2025-05-07 ENCOUNTER — ANESTHESIA EVENT (OUTPATIENT)
Dept: GASTROENTEROLOGY | Facility: HOSPITAL | Age: OVER 89
End: 2025-05-07
Payer: MEDICARE

## 2025-05-07 LAB
ALBUMIN SERPL BCP-MCNC: 3.3 G/DL (ref 3.4–5)
ALP SERPL-CCNC: 64 U/L (ref 33–136)
ALT SERPL W P-5'-P-CCNC: 17 U/L (ref 10–52)
ANION GAP SERPL CALC-SCNC: 12 MMOL/L (ref 10–20)
AST SERPL W P-5'-P-CCNC: 23 U/L (ref 9–39)
BASOPHILS # BLD AUTO: 0.05 X10*3/UL (ref 0–0.1)
BASOPHILS NFR BLD AUTO: 0.7 %
BILIRUB SERPL-MCNC: 0.4 MG/DL (ref 0–1.2)
BUN SERPL-MCNC: 30 MG/DL (ref 6–23)
CALCIUM SERPL-MCNC: 8.1 MG/DL (ref 8.6–10.3)
CHLORIDE SERPL-SCNC: 99 MMOL/L (ref 98–107)
CO2 SERPL-SCNC: 28 MMOL/L (ref 21–32)
CREAT SERPL-MCNC: 1.57 MG/DL (ref 0.5–1.3)
EGFRCR SERPLBLD CKD-EPI 2021: 41 ML/MIN/1.73M*2
EOSINOPHIL # BLD AUTO: 0.22 X10*3/UL (ref 0–0.4)
EOSINOPHIL NFR BLD AUTO: 3 %
ERYTHROCYTE [DISTWIDTH] IN BLOOD BY AUTOMATED COUNT: 17.1 % (ref 11.5–14.5)
GLUCOSE BLD MANUAL STRIP-MCNC: 151 MG/DL (ref 74–99)
GLUCOSE BLD MANUAL STRIP-MCNC: 168 MG/DL (ref 74–99)
GLUCOSE BLD MANUAL STRIP-MCNC: 295 MG/DL (ref 74–99)
GLUCOSE SERPL-MCNC: 168 MG/DL (ref 74–99)
HCT VFR BLD AUTO: 27.7 % (ref 41–52)
HGB BLD-MCNC: 8.3 G/DL (ref 13.5–17.5)
IMM GRANULOCYTES # BLD AUTO: 0.04 X10*3/UL (ref 0–0.5)
IMM GRANULOCYTES NFR BLD AUTO: 0.5 % (ref 0–0.9)
LYMPHOCYTES # BLD AUTO: 0.75 X10*3/UL (ref 0.8–3)
LYMPHOCYTES NFR BLD AUTO: 10.2 %
MCH RBC QN AUTO: 30.1 PG (ref 26–34)
MCHC RBC AUTO-ENTMCNC: 30 G/DL (ref 32–36)
MCV RBC AUTO: 100 FL (ref 80–100)
MONOCYTES # BLD AUTO: 0.89 X10*3/UL (ref 0.05–0.8)
MONOCYTES NFR BLD AUTO: 12.1 %
NEUTROPHILS # BLD AUTO: 5.39 X10*3/UL (ref 1.6–5.5)
NEUTROPHILS NFR BLD AUTO: 73.5 %
NRBC BLD-RTO: 0 /100 WBCS (ref 0–0)
PLATELET # BLD AUTO: 224 X10*3/UL (ref 150–450)
POTASSIUM SERPL-SCNC: 3.6 MMOL/L (ref 3.5–5.3)
PROT SERPL-MCNC: 5.9 G/DL (ref 6.4–8.2)
RBC # BLD AUTO: 2.76 X10*6/UL (ref 4.5–5.9)
SODIUM SERPL-SCNC: 135 MMOL/L (ref 136–145)
WBC # BLD AUTO: 7.3 X10*3/UL (ref 4.4–11.3)

## 2025-05-07 PROCEDURE — 3700000002 HC GENERAL ANESTHESIA TIME - EACH INCREMENTAL 1 MINUTE

## 2025-05-07 PROCEDURE — 0DJD8ZZ INSPECTION OF LOWER INTESTINAL TRACT, VIA NATURAL OR ARTIFICIAL OPENING ENDOSCOPIC: ICD-10-PCS | Performed by: STUDENT IN AN ORGANIZED HEALTH CARE EDUCATION/TRAINING PROGRAM

## 2025-05-07 PROCEDURE — 2500000001 HC RX 250 WO HCPCS SELF ADMINISTERED DRUGS (ALT 637 FOR MEDICARE OP): Performed by: PHYSICIAN ASSISTANT

## 2025-05-07 PROCEDURE — 88305 TISSUE EXAM BY PATHOLOGIST: CPT | Mod: TC,PARLAB | Performed by: STUDENT IN AN ORGANIZED HEALTH CARE EDUCATION/TRAINING PROGRAM

## 2025-05-07 PROCEDURE — 82947 ASSAY GLUCOSE BLOOD QUANT: CPT

## 2025-05-07 PROCEDURE — 45378 DIAGNOSTIC COLONOSCOPY: CPT | Performed by: STUDENT IN AN ORGANIZED HEALTH CARE EDUCATION/TRAINING PROGRAM

## 2025-05-07 PROCEDURE — 85025 COMPLETE CBC W/AUTO DIFF WBC: CPT | Performed by: INTERNAL MEDICINE

## 2025-05-07 PROCEDURE — 7100000010 HC PHASE TWO TIME - EACH INCREMENTAL 1 MINUTE

## 2025-05-07 PROCEDURE — 2500000002 HC RX 250 W HCPCS SELF ADMINISTERED DRUGS (ALT 637 FOR MEDICARE OP, ALT 636 FOR OP/ED): Performed by: PHYSICIAN ASSISTANT

## 2025-05-07 PROCEDURE — 1100000001 HC PRIVATE ROOM DAILY

## 2025-05-07 PROCEDURE — 94640 AIRWAY INHALATION TREATMENT: CPT

## 2025-05-07 PROCEDURE — A45378 PR COLONOSCOPY,DIAGNOSTIC: Performed by: ANESTHESIOLOGY

## 2025-05-07 PROCEDURE — 3700000001 HC GENERAL ANESTHESIA TIME - INITIAL BASE CHARGE

## 2025-05-07 PROCEDURE — 99100 ANES PT EXTEME AGE<1 YR&>70: CPT | Performed by: ANESTHESIOLOGY

## 2025-05-07 PROCEDURE — 2500000001 HC RX 250 WO HCPCS SELF ADMINISTERED DRUGS (ALT 637 FOR MEDICARE OP): Performed by: INTERNAL MEDICINE

## 2025-05-07 PROCEDURE — 43239 EGD BIOPSY SINGLE/MULTIPLE: CPT | Performed by: STUDENT IN AN ORGANIZED HEALTH CARE EDUCATION/TRAINING PROGRAM

## 2025-05-07 PROCEDURE — 2500000002 HC RX 250 W HCPCS SELF ADMINISTERED DRUGS (ALT 637 FOR MEDICARE OP, ALT 636 FOR OP/ED): Performed by: INTERNAL MEDICINE

## 2025-05-07 PROCEDURE — 2500000004 HC RX 250 GENERAL PHARMACY W/ HCPCS (ALT 636 FOR OP/ED): Mod: JZ | Performed by: PHYSICIAN ASSISTANT

## 2025-05-07 PROCEDURE — 0DB78ZX EXCISION OF STOMACH, PYLORUS, VIA NATURAL OR ARTIFICIAL OPENING ENDOSCOPIC, DIAGNOSTIC: ICD-10-PCS | Performed by: STUDENT IN AN ORGANIZED HEALTH CARE EDUCATION/TRAINING PROGRAM

## 2025-05-07 PROCEDURE — 36415 COLL VENOUS BLD VENIPUNCTURE: CPT | Performed by: INTERNAL MEDICINE

## 2025-05-07 PROCEDURE — 80053 COMPREHEN METABOLIC PANEL: CPT | Performed by: INTERNAL MEDICINE

## 2025-05-07 PROCEDURE — 7100000009 HC PHASE TWO TIME - INITIAL BASE CHARGE

## 2025-05-07 PROCEDURE — 2500000004 HC RX 250 GENERAL PHARMACY W/ HCPCS (ALT 636 FOR OP/ED): Performed by: ANESTHESIOLOGIST ASSISTANT

## 2025-05-07 PROCEDURE — 2500000004 HC RX 250 GENERAL PHARMACY W/ HCPCS (ALT 636 FOR OP/ED): Performed by: INTERNAL MEDICINE

## 2025-05-07 PROCEDURE — A45378 PR COLONOSCOPY,DIAGNOSTIC: Performed by: ANESTHESIOLOGIST ASSISTANT

## 2025-05-07 RX ORDER — KETAMINE HCL IN NACL, ISO-OSM 100MG/10ML
SYRINGE (ML) INJECTION AS NEEDED
Status: DISCONTINUED | OUTPATIENT
Start: 2025-05-07 | End: 2025-05-07

## 2025-05-07 RX ORDER — PROPOFOL 10 MG/ML
INJECTION, EMULSION INTRAVENOUS CONTINUOUS PRN
Status: DISCONTINUED | OUTPATIENT
Start: 2025-05-07 | End: 2025-05-07

## 2025-05-07 RX ADMIN — ATORVASTATIN CALCIUM 10 MG: 10 TABLET, FILM COATED ORAL at 20:36

## 2025-05-07 RX ADMIN — Medication 25 MCG: at 09:47

## 2025-05-07 RX ADMIN — SENNOSIDES AND DOCUSATE SODIUM 1 TABLET: 50; 8.6 TABLET ORAL at 20:36

## 2025-05-07 RX ADMIN — TORSEMIDE 20 MG: 20 TABLET ORAL at 09:46

## 2025-05-07 RX ADMIN — INSULIN LISPRO 2 UNITS: 100 INJECTION, SOLUTION INTRAVENOUS; SUBCUTANEOUS at 18:48

## 2025-05-07 RX ADMIN — GUAIFENESIN 600 MG: 600 TABLET, EXTENDED RELEASE ORAL at 09:46

## 2025-05-07 RX ADMIN — IPRATROPIUM BROMIDE 0.5 MG: 0.5 SOLUTION RESPIRATORY (INHALATION) at 14:38

## 2025-05-07 RX ADMIN — TRAZODONE HYDROCHLORIDE 25 MG: 50 TABLET ORAL at 20:36

## 2025-05-07 RX ADMIN — IPRATROPIUM BROMIDE 0.5 MG: 0.5 SOLUTION RESPIRATORY (INHALATION) at 18:35

## 2025-05-07 RX ADMIN — AZITHROMYCIN DIHYDRATE 500 MG: 500 INJECTION, POWDER, LYOPHILIZED, FOR SOLUTION INTRAVENOUS at 18:53

## 2025-05-07 RX ADMIN — INSULIN GLARGINE 10 UNITS: 100 INJECTION, SOLUTION SUBCUTANEOUS at 20:36

## 2025-05-07 RX ADMIN — GUAIFENESIN 600 MG: 600 TABLET, EXTENDED RELEASE ORAL at 20:36

## 2025-05-07 RX ADMIN — FERROUS SULFATE TAB 325 MG (65 MG ELEMENTAL FE) 325 MG: 325 (65 FE) TAB at 20:36

## 2025-05-07 RX ADMIN — Medication 10 MG: at 12:30

## 2025-05-07 RX ADMIN — BRIMONIDINE TARTRATE 1 DROP: 2 SOLUTION/ DROPS OPHTHALMIC at 20:36

## 2025-05-07 RX ADMIN — INSULIN LISPRO 2 UNITS: 100 INJECTION, SOLUTION INTRAVENOUS; SUBCUTANEOUS at 09:47

## 2025-05-07 RX ADMIN — IPRATROPIUM BROMIDE 0.5 MG: 0.5 SOLUTION RESPIRATORY (INHALATION) at 07:14

## 2025-05-07 RX ADMIN — BISACODYL 5 MG: 5 TABLET, COATED ORAL at 09:46

## 2025-05-07 RX ADMIN — CEFTRIAXONE SODIUM 2 G: 2 INJECTION, SOLUTION INTRAVENOUS at 17:54

## 2025-05-07 RX ADMIN — BRIMONIDINE TARTRATE 1 DROP: 2 SOLUTION/ DROPS OPHTHALMIC at 09:48

## 2025-05-07 RX ADMIN — PANTOPRAZOLE SODIUM 40 MG: 40 INJECTION, POWDER, FOR SOLUTION INTRAVENOUS at 09:47

## 2025-05-07 RX ADMIN — Medication 20 MG: at 12:20

## 2025-05-07 RX ADMIN — LEVOTHYROXINE SODIUM 100 MCG: 0.1 TABLET ORAL at 06:26

## 2025-05-07 SDOH — HEALTH STABILITY: MENTAL HEALTH: CURRENT SMOKER: 0

## 2025-05-07 ASSESSMENT — COGNITIVE AND FUNCTIONAL STATUS - GENERAL
DAILY ACTIVITIY SCORE: 19
STANDING UP FROM CHAIR USING ARMS: A LITTLE
MOVING FROM LYING ON BACK TO SITTING ON SIDE OF FLAT BED WITH BEDRAILS: A LITTLE
MOVING TO AND FROM BED TO CHAIR: A LITTLE
DAILY ACTIVITIY SCORE: 19
PERSONAL GROOMING: A LITTLE
HELP NEEDED FOR BATHING: A LITTLE
WALKING IN HOSPITAL ROOM: A LITTLE
TOILETING: A LITTLE
DRESSING REGULAR LOWER BODY CLOTHING: A LITTLE
TURNING FROM BACK TO SIDE WHILE IN FLAT BAD: A LITTLE
DRESSING REGULAR LOWER BODY CLOTHING: A LITTLE
WALKING IN HOSPITAL ROOM: A LITTLE
TURNING FROM BACK TO SIDE WHILE IN FLAT BAD: A LITTLE
CLIMB 3 TO 5 STEPS WITH RAILING: A LOT
MOVING FROM LYING ON BACK TO SITTING ON SIDE OF FLAT BED WITH BEDRAILS: A LITTLE
CLIMB 3 TO 5 STEPS WITH RAILING: A LOT
PERSONAL GROOMING: A LITTLE
MOVING TO AND FROM BED TO CHAIR: A LITTLE
STANDING UP FROM CHAIR USING ARMS: A LITTLE
DRESSING REGULAR UPPER BODY CLOTHING: A LITTLE
MOBILITY SCORE: 17
DRESSING REGULAR UPPER BODY CLOTHING: A LITTLE
TOILETING: A LITTLE
HELP NEEDED FOR BATHING: A LITTLE
MOBILITY SCORE: 17

## 2025-05-07 ASSESSMENT — PAIN SCALES - GENERAL
PAINLEVEL_OUTOF10: 0 - NO PAIN
PAIN_LEVEL: 0
PAINLEVEL_OUTOF10: 0 - NO PAIN

## 2025-05-07 ASSESSMENT — PAIN - FUNCTIONAL ASSESSMENT
PAIN_FUNCTIONAL_ASSESSMENT: 0-10
PAIN_FUNCTIONAL_ASSESSMENT: 0-10

## 2025-05-07 NOTE — ANESTHESIA PREPROCEDURE EVALUATION
Patient: Saul Asif    Procedure Information       Date/Time: 05/07/25 1220    Scheduled providers: Cleve Greene MD    Procedures:       EGD      COLONOSCOPY    Location: [facility]            Relevant Problems   Anesthesia (within normal limits)      Cardiac   (+) Atrial fibrillation (Multi)   (+) Hyperlipidemia   (+) Paroxysmal atrial fibrillation (Multi)   (+) RBBB   (+) Severe aortic stenosis      GI   (+) Gastric ulcer   (+) HH (hiatus hernia)   (+) Irritable bowel syndrome      /Renal   (+) Prostate cancer (Multi)      Liver   (+) Asymptomatic cholelithiasis      Endocrine   (+) Acquired hypothyroidism   (+) Secondary hyperparathyroidism of renal origin (Multi)      Hematology   (+) Anemia, unspecified type   (+) Iron deficiency anemia      ID   (+) Helicobacter pylori (H. pylori) infection      Skin   (+) Squamous cell cancer of skin of left cheek       Clinical information reviewed:    Allergies  Meds               NPO Detail:  NPO/Void Status  Carbohydrate Drink Given Prior to Surgery? : N  Date of Last Liquid: 05/07/25  Time of Last Liquid: 0946  Date of Last Solid: 05/06/25  Time of Last Solid: 0800  Last Intake Type: Clear fluids         Physical Exam    Airway  Mallampati: I  TM distance: >3 FB  Neck ROM: full  Mouth opening: 3 or more finger widths     Cardiovascular   (+) murmur     Dental        Pulmonary - normal exam   Abdominal - normal exam     Other findings: Loud Machine murmur        Anesthesia Plan    History of general anesthesia?: yes  History of complications of general anesthesia?: no    ASA 4     MAC     The patient is not a current smoker.  Patient was not previously instructed to abstain from smoking on day of procedure.  Patient did not smoke on day of procedure.    intravenous induction   Anesthetic plan and risks discussed with patient.  Use of blood products discussed with patient who.    Plan discussed with CAA.

## 2025-05-07 NOTE — PROGRESS NOTES
Pulmonary Critical Care consult  Patient Name :Saul Asif  Date of service : 05/07/25  MRN: [id number]  YOB: 1933      Reason for consultation  Cough/recent COVID infection    History of Present Illness:      89 y.o. male  on day  3 of admission presenting with Anemia, unspecified type.  Patient was transferred from nursing facility due to increased sugar fatigue, had a recent COVID infection at the beginning of the year, last chest x-ray available for comparison was in 2024, his x-ray done during this admission showed bibasilar 8 atypical infiltrate,, workup in the ED revealed anemia, he was 92% on room air but does have moist cough, white count is normal at 10    Daily progress:  May 6, 2025: Patient continues to be on room air.  She continues to have mild productive cough.  He has no fever, chills, rigors.  X-ray from May 3, 2025 was personally reviewed and independently interpreted and showed bibasilar opacities.  Patient was discussed with primary team.    May 7, 2025: Patient remained on room air.  He underwent EGD and colonoscopy was found to have cameral ulcer secondary to hiatal hernia.  Patient has no fever, chills, rigors            Past Medical/Surgical History:    has a past medical history of Diabetes mellitus (Multi).   has no past surgical history on file.   reports that he has quit smoking. His smoking use included cigarettes. He has never used smokeless tobacco. No history on file for alcohol use and drug use.  Family History:   No relevant family history has been documented for this patient.    Allergies:     Patient has no known allergies.      Social history:   reports that he has quit smoking. His smoking use included cigarettes. He has never used smokeless tobacco.      Review of Systems:   General: denies weight gain, denies loss of appetite, fever, chills, night sweats.  HEENT: denies headaches, dizziness, head trauma, visual changes, eye pain, tinnitus, nosebleeds,  hoarseness or throat pain    Respiratory: denies chest pain, dyspnea, cough and hemoptysis  Cardiovascular: denies orthopnea, paroxysmal nocturnal dyspnea, leg swelling, and previous heart attack.    Gastrointestinal: denies pain, nausea vomiting, diarrhea, constipation, melena or bleeding.  Genitourinary: denies hematuria, frequency, urgency or dysuria  Neurology: denies syncope, seizures, paralysis, paraesthesia   Endocrine: denies polyuria, polydipsia, skin or hair changes, and heat or cold intolerance  Musculoskeletal: denies joint pain, swelling, arthritis or myalgia  Hematologic: denies bleeding, adenopathy and easy bruising  Skin: denies rashes and skin discoloration  Psychiatry: denies depression    Physical Exam:   Vital Signs:   Vitals:    05/07/25 1352   BP: 105/58   Pulse: 89   Resp: 16   Temp: 36.9 °C (98.4 °F)   SpO2: 96%     Vitals:    05/06/25 0527   Weight: 78.5 kg (173 lb 1 oz)         Input/Output:    Intake/Output Summary (Last 24 hours) at 5/7/2025 1443  Last data filed at 5/6/2025 1824  Gross per 24 hour   Intake 250 ml   Output --   Net 250 ml       Oxygen requirements:    Ventilator Information:     Oxygen Therapy/Pulse Ox  Medical Gas Therapy: None (Room air)  SpO2: 96 %  Patient Activity During SpO2 Measurement: At rest  Temp: 36.9 °C (98.4 °F)        General appearance: Not in pain or distress, in no respiratory distress    HEENT: Atraumatic/normocephalic, EOMI, SABRA, pharynx clear, moist mucosa, redness of the uvula appreciated,   Neck: Supple, no jugular venous distension, lymphadenopathy, thyromegaly or carotid bruits  Chest: Rhonchi  Cardiovascular: Normal S1, S2, regular rate and rhythm, no murmur, rub or gallop  Abdomen: Normal sounds present, soft, lax with no tenderness, no hepatosplenomegaly, and no masses  Extremities: No edema. Pulses are equally present.   Skin: intact, no rashes   Neurologic: Alert and oriented x 3, No focal deficit         Current Medications:   Scheduled  medications  Scheduled Medications[1]  Continuous medications  Continuous Medications[2]  PRN medications  PRN Medications[3]      Investigations:  Labs, radiological imaging and cardiac work up were personally reviewed    Results from last 7 days   Lab Units 05/07/25  0726 05/06/25  0654 05/05/25  0655   SODIUM mmol/L 135* 136 136   POTASSIUM mmol/L 3.6 3.6 3.7   CHLORIDE mmol/L 99 97* 97*   CO2 mmol/L 28 30 31   BUN mg/dL 30* 35* 41*   CREATININE mg/dL 1.57* 1.67* 1.62*   GLUCOSE mg/dL 168* 179* 216*   CALCIUM mg/dL 8.1* 8.8 8.8     Results from last 7 days   Lab Units 05/07/25  0726   WBC AUTO x10*3/uL 7.3   HEMOGLOBIN g/dL 8.3*   HEMATOCRIT % 27.7*   PLATELETS AUTO x10*3/uL 224         Imaging  XR chest 1 view  Result Date: 5/3/2025  1. New scattered bilateral lower lobe predominant nodular infiltrates could be infectious. Finding could be further assessed by dedicated chest CT scan.   Signed by: Cleve Cardenas 5/3/2025 2:11 PM Dictation workstation:   [id number]      Cardiology, Vascular, and Other Imaging  Colonoscopy Diagnostic  Result Date: 5/7/2025  Table formatting from the original result was not included. Impression Examination limited by quality of prep. 6 polyps in the ascending colon, transverse colon and descending colon. Not removed due to indication of examination and overall health No etiology for anemia identified. Based on findings of EGD, suspect anemia may be due to intermittent delilah ulcers secondary to hiatal hernia. Diverticulosis in the sigmoid colon Findings Six polyps measuring smaller than 5 mm in the ascending colon, transverse colon and descending colon Few diverticula in the sigmoid colon  Recommendation Follow up with primary gastroenterologist PPI therapy definitely Resume prior diet Watch for complications  Indication Dark stools, Anemia, unspecified type, On warfarin for atrial fibrillation (Multi) Post-Op Diagnosis None Staff Staff Role Cleve Greene MD Proceduralist Medications  See Anesthesia Record. Preprocedure A history and physical has been performed, and patient medication allergies have been reviewed. The patient's tolerance of previous anesthesia has been reviewed. The risks and benefits of the procedure and the sedation options and risks were discussed with the patient. All questions were answered and informed consent obtained. Details of the Procedure The patient underwent monitored anesthesia care, which was administered by an anesthesia professional. The patient's blood pressure, ECG, ETCO2, heart rate, level of consciousness, oxygen and respirations were monitored throughout the procedure. A digital rectal exam was performed. The scope was introduced through the anus and advanced to the cecum. Retroflexion was performed in the rectum. The quality of bowel preparation was evaluated using the [place] Bowel Preparation Scale with scores of: right colon = 2, transverse colon = 2, left colon = 1. The total BBPS score was 5. Bowel prep was not adequate. The patient experienced no blood loss. The procedure was not difficult. The patient tolerated the procedure well. There were no apparent adverse events. Events Procedure Events Event Event Time ENDO SCOPE IN TIME 5/7/2025 12:24 PM ENDO SCOPE OUT TIME 5/7/2025 12:28 PM ENDO SCOPE IN TIME 5/7/2025 12:33 PM ENDO CECUM REACHED 5/7/2025 12:36 PM ENDO SCOPE OUT TIME 5/7/2025 12:46 PM Specimens ID Type Source Tests Collected by Time 1 : COLD BX Tissue STOMACH ANTRUM BIOPSY SURGICAL PATHOLOGY EXAM Cleve Greene MD 5/7/2025 1227 Procedure Location [facility] [address] [facility] [id number] [phone] Referring Provider Marita Moreno, APRN-CNP Procedure Provider MD Marita Pollard     ECG 12 lead  Result Date: 5/5/2025  Sinus or ectopic atrial rhythm Prolonged IN interval Right bundle branch block        Assessment  Saul Asif IS 89 y.o. male  on day  3 of admission presenting with  Anemia, unspecified type. Actively being treated for the  following medical Problems:    Bibasilar pulm infiltrates, no fever or leukocytosis, recent COVID infection  Possible community-acquired pneumonia versus remanence of recent pneumonia on the chest x-ray  Anemia with hemoglobin less than 7  Multifactorial shortness of breath  CKD  A-fib on anticoagulation  Aortic stenosis    Recommendation:    Continue with ceftriaxone and azithromycin for 5 days  INR is 1.5 which covers for DVT prophylaxis.  Viral panels were negative  Patient is not requiring oxygen, oxygen for saturation of 89 to 94%  Incentive spirometry  Bronchodilators therapy as needed  PT/OT  DVT prophylaxis  Minimize benzodiazepine and narcotics  Encourage ambulation  Head of bed elevation and aspiration precautions.  Ipratropium every 8 hours      Muriel Moreau MD  Pulmonary critical care consultant  05/07/25  2:43 PM       STAFF PHYSICIAN NOTE OF PERSONAL INVOLVEMENT IN CARE  As the attending physician, I certify that I personally reviewed the patient's history and personally examined the patient to confirm the physical findings described above, and that I reviewed the relevant imaging studies and available reports.  I also discussed the differential diagnosis and all of the proposed management plans with the patient and individuals accompanying the patient to this visit.  They had the opportunity to ask questions about the proposed management plans and to have those questions answered.           [1] atorvastatin, 10 mg, oral, Nightly  azithromycin, 500 mg, intravenous, q24h  bisacodyl, 5 mg, oral, Daily  brimonidine, 1 drop, Both Eyes, BID  cefTRIAXone, 2 g, intravenous, q24h  cholecalciferol, 25 mcg, oral, Daily  ferrous sulfate, 1 tablet, oral, Nightly  guaiFENesin, 600 mg, oral, BID  insulin glargine, 10 Units, subcutaneous, Nightly  insulin lispro, 0-10 Units, subcutaneous, TID AC  ipratropium, 0.5 mg, nebulization, TID  levothyroxine,  100 mcg, oral, Daily  pantoprazole, 40 mg, intravenous, Daily  polyethylene glycol, 17 g, oral, Daily  sennosides-docusate sodium, 1 tablet, oral, Nightly  torsemide, 20 mg, oral, Daily  traZODone, 25 mg, oral, Nightly  [Held by provider] warfarin, 2.5 mg, oral, Daily     [2]    [3] PRN medications: acetaminophen **OR** acetaminophen **OR** acetaminophen, dextrose, dextrose, glucagon, glucagon, ipratropium-albuteroL, melatonin, oxygen, polyethylene glycol

## 2025-05-07 NOTE — ANESTHESIA POSTPROCEDURE EVALUATION
Patient: Saul Asif    Procedure Summary       Date: 05/07/25 Room / Location: [facility]    Anesthesia Start: 1216 Anesthesia Stop: 1251    Procedures:       EGD      COLONOSCOPY Diagnosis:       Anemia, unspecified type      On warfarin for atrial fibrillation (Multi)      Dark stools    Scheduled Providers: Cleve Greene MD Responsible Provider: Drew Bundy MD    Anesthesia Type: MAC ASA Status: 4            Anesthesia Type: MAC    Vitals Value Taken Time   /52 05/07/25 12:51   Temp 36.5 05/07/25 12:51   Pulse 105 05/07/25 12:51   Resp 16 05/07/25 12:51   SpO2 98 05/07/25 12:51       Anesthesia Post Evaluation    Patient location during evaluation: PACU  Patient participation: complete - patient participated  Level of consciousness: awake and alert  Pain score: 0  Pain management: adequate  Airway patency: patent  Cardiovascular status: acceptable  Respiratory status: acceptable  Hydration status: acceptable  Postoperative Nausea and Vomiting: none        No notable events documented.

## 2025-05-07 NOTE — CARE PLAN
The patient's goals for the shift include rest    The clinical goals for the shift include improve patient safety    Over the shift, the patient did not make progress toward the following goals. Barriers to progression include   Problem: Diabetes  Goal: Achieve decreasing blood glucose levels by end of shift  Outcome: Progressing  Goal: Increase stability of blood glucose readings by end of shift  Outcome: Progressing  Goal: Decrease in ketones present in urine by end of shift  Outcome: Progressing  Goal: Maintain electrolyte levels within acceptable range throughout shift  Outcome: Progressing  Goal: Maintain glucose levels >70mg/dl to <250mg/dl throughout shift  Outcome: Progressing  Goal: No changes in neurological exam by end of shift  Outcome: Progressing  Goal: Learn about and adhere to nutrition recommendations by end of shift  Outcome: Progressing  Goal: Vital signs within normal range for age by end of shift  Outcome: Progressing  Goal: Increase self care and/or family involovement by end of shift  Outcome: Progressing  Goal: Receive DSME education by end of shift  Outcome: Progressing

## 2025-05-07 NOTE — CARE PLAN
The patient's goals for the shift include rest    The clinical goals for the shift include patient will remain safe and comfortable during shift    Problem: Diabetes  Goal: Achieve decreasing blood glucose levels by end of shift  Outcome: Progressing  Goal: Increase stability of blood glucose readings by end of shift  Outcome: Progressing  Goal: Decrease in ketones present in urine by end of shift  Outcome: Progressing  Goal: Maintain electrolyte levels within acceptable range throughout shift  Outcome: Progressing  Goal: Maintain glucose levels >70mg/dl to <250mg/dl throughout shift  Outcome: Progressing  Goal: No changes in neurological exam by end of shift  Outcome: Progressing  Goal: Learn about and adhere to nutrition recommendations by end of shift  Outcome: Progressing  Goal: Vital signs within normal range for age by end of shift  Outcome: Progressing  Goal: Increase self care and/or family involovement by end of shift  Outcome: Progressing  Goal: Receive DSME education by end of shift  Outcome: Progressing

## 2025-05-07 NOTE — CARE PLAN
Status post EGD and colonoscopy today with findings of Maximino ulcers secondary to a hiatal hernia, 6 polyps in the ascending, transverse and descending colon, not removed due to examination.  Diverticulosis of sigmoid colon, no etiology for anemia identified.    Plan:  - PPI indefinitely  - Can resume previous diet  - Okay for discharge from a GI standpoint    Silverio Meelndez MD  PGY-1, Internal Medicine

## 2025-05-08 LAB
ALBUMIN SERPL BCP-MCNC: 3.3 G/DL (ref 3.4–5)
ALP SERPL-CCNC: 65 U/L (ref 33–136)
ALT SERPL W P-5'-P-CCNC: 16 U/L (ref 10–52)
ANION GAP SERPL CALC-SCNC: 12 MMOL/L (ref 10–20)
AST SERPL W P-5'-P-CCNC: 20 U/L (ref 9–39)
BASOPHILS # BLD AUTO: 0.05 X10*3/UL (ref 0–0.1)
BASOPHILS NFR BLD AUTO: 0.5 %
BILIRUB SERPL-MCNC: 0.4 MG/DL (ref 0–1.2)
BUN SERPL-MCNC: 26 MG/DL (ref 6–23)
CALCIUM SERPL-MCNC: 8.1 MG/DL (ref 8.6–10.3)
CHLORIDE SERPL-SCNC: 96 MMOL/L (ref 98–107)
CO2 SERPL-SCNC: 30 MMOL/L (ref 21–32)
CREAT SERPL-MCNC: 1.6 MG/DL (ref 0.5–1.3)
EGFRCR SERPLBLD CKD-EPI 2021: 40 ML/MIN/1.73M*2
EOSINOPHIL # BLD AUTO: 0.24 X10*3/UL (ref 0–0.4)
EOSINOPHIL NFR BLD AUTO: 2.3 %
ERYTHROCYTE [DISTWIDTH] IN BLOOD BY AUTOMATED COUNT: 16.6 % (ref 11.5–14.5)
GLUCOSE BLD MANUAL STRIP-MCNC: 108 MG/DL (ref 74–99)
GLUCOSE BLD MANUAL STRIP-MCNC: 172 MG/DL (ref 74–99)
GLUCOSE BLD MANUAL STRIP-MCNC: 307 MG/DL (ref 74–99)
GLUCOSE SERPL-MCNC: 179 MG/DL (ref 74–99)
HCT VFR BLD AUTO: 25.5 % (ref 41–52)
HGB BLD-MCNC: 8 G/DL (ref 13.5–17.5)
IMM GRANULOCYTES # BLD AUTO: 0.06 X10*3/UL (ref 0–0.5)
IMM GRANULOCYTES NFR BLD AUTO: 0.6 % (ref 0–0.9)
LYMPHOCYTES # BLD AUTO: 0.72 X10*3/UL (ref 0.8–3)
LYMPHOCYTES NFR BLD AUTO: 6.8 %
MCH RBC QN AUTO: 30.8 PG (ref 26–34)
MCHC RBC AUTO-ENTMCNC: 31.4 G/DL (ref 32–36)
MCV RBC AUTO: 98 FL (ref 80–100)
MONOCYTES # BLD AUTO: 0.99 X10*3/UL (ref 0.05–0.8)
MONOCYTES NFR BLD AUTO: 9.3 %
NEUTROPHILS # BLD AUTO: 8.53 X10*3/UL (ref 1.6–5.5)
NEUTROPHILS NFR BLD AUTO: 80.5 %
NRBC BLD-RTO: 0 /100 WBCS (ref 0–0)
PLATELET # BLD AUTO: 220 X10*3/UL (ref 150–450)
POTASSIUM SERPL-SCNC: 3.5 MMOL/L (ref 3.5–5.3)
PROT SERPL-MCNC: 5.8 G/DL (ref 6.4–8.2)
RBC # BLD AUTO: 2.6 X10*6/UL (ref 4.5–5.9)
SODIUM SERPL-SCNC: 134 MMOL/L (ref 136–145)
WBC # BLD AUTO: 10.6 X10*3/UL (ref 4.4–11.3)

## 2025-05-08 PROCEDURE — 2500000002 HC RX 250 W HCPCS SELF ADMINISTERED DRUGS (ALT 637 FOR MEDICARE OP, ALT 636 FOR OP/ED): Performed by: INTERNAL MEDICINE

## 2025-05-08 PROCEDURE — 2500000004 HC RX 250 GENERAL PHARMACY W/ HCPCS (ALT 636 FOR OP/ED): Mod: JZ | Performed by: PHYSICIAN ASSISTANT

## 2025-05-08 PROCEDURE — 82947 ASSAY GLUCOSE BLOOD QUANT: CPT

## 2025-05-08 PROCEDURE — 85025 COMPLETE CBC W/AUTO DIFF WBC: CPT | Performed by: INTERNAL MEDICINE

## 2025-05-08 PROCEDURE — 2500000002 HC RX 250 W HCPCS SELF ADMINISTERED DRUGS (ALT 637 FOR MEDICARE OP, ALT 636 FOR OP/ED): Performed by: PHYSICIAN ASSISTANT

## 2025-05-08 PROCEDURE — 2500000001 HC RX 250 WO HCPCS SELF ADMINISTERED DRUGS (ALT 637 FOR MEDICARE OP): Performed by: INTERNAL MEDICINE

## 2025-05-08 PROCEDURE — 80053 COMPREHEN METABOLIC PANEL: CPT | Performed by: INTERNAL MEDICINE

## 2025-05-08 PROCEDURE — 1100000001 HC PRIVATE ROOM DAILY

## 2025-05-08 PROCEDURE — 94640 AIRWAY INHALATION TREATMENT: CPT

## 2025-05-08 PROCEDURE — 2500000004 HC RX 250 GENERAL PHARMACY W/ HCPCS (ALT 636 FOR OP/ED): Performed by: INTERNAL MEDICINE

## 2025-05-08 PROCEDURE — 2500000001 HC RX 250 WO HCPCS SELF ADMINISTERED DRUGS (ALT 637 FOR MEDICARE OP): Performed by: PHYSICIAN ASSISTANT

## 2025-05-08 PROCEDURE — 97116 GAIT TRAINING THERAPY: CPT | Mod: GP,CQ

## 2025-05-08 PROCEDURE — 36415 COLL VENOUS BLD VENIPUNCTURE: CPT | Performed by: INTERNAL MEDICINE

## 2025-05-08 RX ADMIN — BISACODYL 5 MG: 5 TABLET, COATED ORAL at 09:44

## 2025-05-08 RX ADMIN — GUAIFENESIN 600 MG: 600 TABLET, EXTENDED RELEASE ORAL at 20:41

## 2025-05-08 RX ADMIN — AZITHROMYCIN DIHYDRATE 500 MG: 500 INJECTION, POWDER, LYOPHILIZED, FOR SOLUTION INTRAVENOUS at 18:07

## 2025-05-08 RX ADMIN — TORSEMIDE 20 MG: 20 TABLET ORAL at 09:44

## 2025-05-08 RX ADMIN — CEFTRIAXONE SODIUM 2 G: 2 INJECTION, SOLUTION INTRAVENOUS at 17:04

## 2025-05-08 RX ADMIN — PANTOPRAZOLE SODIUM 40 MG: 40 INJECTION, POWDER, FOR SOLUTION INTRAVENOUS at 09:45

## 2025-05-08 RX ADMIN — IPRATROPIUM BROMIDE 0.5 MG: 0.5 SOLUTION RESPIRATORY (INHALATION) at 06:44

## 2025-05-08 RX ADMIN — FERROUS SULFATE TAB 325 MG (65 MG ELEMENTAL FE) 325 MG: 325 (65 FE) TAB at 20:41

## 2025-05-08 RX ADMIN — ATORVASTATIN CALCIUM 10 MG: 10 TABLET, FILM COATED ORAL at 20:41

## 2025-05-08 RX ADMIN — BRIMONIDINE TARTRATE 1 DROP: 2 SOLUTION/ DROPS OPHTHALMIC at 20:41

## 2025-05-08 RX ADMIN — TRAZODONE HYDROCHLORIDE 25 MG: 50 TABLET ORAL at 20:41

## 2025-05-08 RX ADMIN — IPRATROPIUM BROMIDE 0.5 MG: 0.5 SOLUTION RESPIRATORY (INHALATION) at 19:16

## 2025-05-08 RX ADMIN — SENNOSIDES AND DOCUSATE SODIUM 1 TABLET: 50; 8.6 TABLET ORAL at 20:41

## 2025-05-08 RX ADMIN — INSULIN LISPRO 8 UNITS: 100 INJECTION, SOLUTION INTRAVENOUS; SUBCUTANEOUS at 12:54

## 2025-05-08 RX ADMIN — IPRATROPIUM BROMIDE 0.5 MG: 0.5 SOLUTION RESPIRATORY (INHALATION) at 13:48

## 2025-05-08 RX ADMIN — INSULIN GLARGINE 10 UNITS: 100 INJECTION, SOLUTION SUBCUTANEOUS at 20:41

## 2025-05-08 RX ADMIN — POLYETHYLENE GLYCOL 3350 17 G: 17 POWDER, FOR SOLUTION ORAL at 09:45

## 2025-05-08 RX ADMIN — LEVOTHYROXINE SODIUM 100 MCG: 0.1 TABLET ORAL at 06:17

## 2025-05-08 RX ADMIN — INSULIN LISPRO 2 UNITS: 100 INJECTION, SOLUTION INTRAVENOUS; SUBCUTANEOUS at 09:45

## 2025-05-08 RX ADMIN — GUAIFENESIN 600 MG: 600 TABLET, EXTENDED RELEASE ORAL at 09:44

## 2025-05-08 RX ADMIN — Medication 25 MCG: at 09:44

## 2025-05-08 RX ADMIN — BRIMONIDINE TARTRATE 1 DROP: 2 SOLUTION/ DROPS OPHTHALMIC at 09:45

## 2025-05-08 ASSESSMENT — COGNITIVE AND FUNCTIONAL STATUS - GENERAL
WALKING IN HOSPITAL ROOM: A LITTLE
MOVING TO AND FROM BED TO CHAIR: A LITTLE
MOBILITY SCORE: 17
TOILETING: A LITTLE
DRESSING REGULAR UPPER BODY CLOTHING: A LITTLE
PERSONAL GROOMING: A LITTLE
TURNING FROM BACK TO SIDE WHILE IN FLAT BAD: A LITTLE
TOILETING: A LITTLE
PERSONAL GROOMING: A LITTLE
CLIMB 3 TO 5 STEPS WITH RAILING: A LOT
WALKING IN HOSPITAL ROOM: A LITTLE
DAILY ACTIVITIY SCORE: 19
STANDING UP FROM CHAIR USING ARMS: A LITTLE
HELP NEEDED FOR BATHING: A LITTLE
MOVING FROM LYING ON BACK TO SITTING ON SIDE OF FLAT BED WITH BEDRAILS: A LITTLE
STANDING UP FROM CHAIR USING ARMS: A LITTLE
HELP NEEDED FOR BATHING: A LITTLE
MOVING FROM LYING ON BACK TO SITTING ON SIDE OF FLAT BED WITH BEDRAILS: A LITTLE
CLIMB 3 TO 5 STEPS WITH RAILING: A LOT
DRESSING REGULAR LOWER BODY CLOTHING: A LITTLE
CLIMB 3 TO 5 STEPS WITH RAILING: A LOT
DAILY ACTIVITIY SCORE: 19
DRESSING REGULAR UPPER BODY CLOTHING: A LITTLE
MOBILITY SCORE: 17
TURNING FROM BACK TO SIDE WHILE IN FLAT BAD: A LITTLE
WALKING IN HOSPITAL ROOM: A LITTLE
MOBILITY SCORE: 17
STANDING UP FROM CHAIR USING ARMS: A LITTLE
DRESSING REGULAR LOWER BODY CLOTHING: A LITTLE
MOVING FROM LYING ON BACK TO SITTING ON SIDE OF FLAT BED WITH BEDRAILS: A LITTLE
TURNING FROM BACK TO SIDE WHILE IN FLAT BAD: A LITTLE
MOVING TO AND FROM BED TO CHAIR: A LITTLE
MOVING TO AND FROM BED TO CHAIR: A LITTLE

## 2025-05-08 ASSESSMENT — PAIN - FUNCTIONAL ASSESSMENT: PAIN_FUNCTIONAL_ASSESSMENT: 0-10

## 2025-05-08 ASSESSMENT — PAIN SCALES - GENERAL
PAINLEVEL_OUTOF10: 0 - NO PAIN
PAINLEVEL_OUTOF10: 0 - NO PAIN

## 2025-05-08 NOTE — PROGRESS NOTES
Physical Therapy    Physical Therapy Treatment    Patient Name: Saul Asif  MRN: [id number]  Department: [facility]  Room: [address] [place] [address]  Today's Date: 5/8/2025  Time Calculation  Start Time: 0950  Stop Time: 1006  Time Calculation (min): 16 min         Assessment/Plan   PT Assessment  End of Session Patient Position: Up in chair, Alarm on     PT Plan  Treatment/Interventions: Bed mobility, Transfer training, Gait training, Balance training, Neuromuscular re-education, Strengthening, Endurance training, Therapeutic exercise, Therapeutic activity  PT Plan: Ongoing PT  PT Frequency: 3 times per week  PT Discharge Recommendations: Low intensity level of continued care  PT Recommended Transfer Status: Stand by assist  PT - OK to Discharge: Yes      General Visit Information:   PT  Visit  PT Received On: 05/08/25  General  Prior to Session Communication: Bedside nurse  Patient Position Received: Bed, 2 rail up, Alarm off, not on at start of session  General Comment:  (pt pleasant; agreeable to participate in therapy)    Subjective   Precautions:  Precautions  Hearing/Visual Limitations: very [place]  Medical Precautions: Fall precautions  Precautions Comment: tele          Objective   Pain:  Pain Assessment  Pain Assessment: 0-10  0-10 (Numeric) Pain Score: 0 - No pain    Cognition:  Cognition  Overall Cognitive Status: Within Functional Limits     Treatments:  Bed Mobility  Bed Mobility:  (sup > sit with supervision.  pt moving quickly and somewhat impulsively at first.)    Ambulation/Gait Training  Ambulation/Gait Training Performed:  (pt ambulates >/=100 ft x 3 including multiple turns and changes in direction, FWW and CGA/SBA.  steady pace; full step-through pattern.  cuing for safe walker management, zee upon turns.  pt also abandoning walker upon approach to chair despite cuing to keep with him.)    Transfers  Transfer:  (sit > stand with FWW and SBA.  stand > sit with min A x 1; poor eccentric control.)    Outcome  Measures:  [facility] Basic Mobility  Turning from your back to your side while in a flat bed without using bedrails: A little  Moving from lying on your back to sitting on the side of a flat bed without using bedrails: A little  Moving to and from bed to chair (including a wheelchair): A little  Standing up from a chair using your arms (e.g. wheelchair or bedside chair): A little  To walk in hospital room: A little  Climbing 3-5 steps with railing: A lot  Basic Mobility - Total Score: 17    Education Documentation  Precautions, taught by Laura Garcia PTA at 5/8/2025 10:34 AM.  Learner: Patient  Readiness: Acceptance  Method: Explanation  Response: Verbalizes Understanding, Needs Reinforcement    Mobility Training, taught by Laura Garcia PTA at 5/8/2025 10:34 AM.  Learner: Patient  Readiness: Acceptance  Method: Explanation  Response: Verbalizes Understanding, Needs Reinforcement    Education Comments  No comments found.        EDUCATION:       Encounter Problems       Encounter Problems (Active)       PT Problem       STG - Pt will transition supine <> sitting with mod I (Progressing)       Start:  05/04/25    Expected End:  05/18/25            STG - Pt will transfer STS with mod I (Progressing)       Start:  05/04/25    Expected End:  05/18/25            STG - Pt will amb >/=150' using LRAD with mod I (Progressing)       Start:  05/04/25    Expected End:  05/18/25            STG - Pt will maintain G standing balance to decrease risk of falls (Progressing)       Start:  05/04/25    Expected End:  05/18/25

## 2025-05-08 NOTE — PROGRESS NOTES
Pulmonary Critical Care consult  Patient Name :Saul Asif  Date of service : 05/08/25  MRN: [id number]  YOB: 1933      Reason for consultation  Cough/recent COVID infection    History of Present Illness:      89 y.o. male  on day  4 of admission presenting with Anemia, unspecified type.  Patient was transferred from nursing facility due to increased sugar fatigue, had a recent COVID infection at the beginning of the year, last chest x-ray available for comparison was in 2024, his x-ray done during this admission showed bibasilar 8 atypical infiltrate,, workup in the ED revealed anemia, he was 92% on room air but does have moist cough, white count is normal at 10    Daily progress:  May 6, 2025: Patient continues to be on room air.  She continues to have mild productive cough.  He has no fever, chills, rigors.  X-ray from May 3, 2025 was personally reviewed and independently interpreted and showed bibasilar opacities.  Patient was discussed with primary team.    May 7, 2025: Patient remained on room air.  He underwent EGD and colonoscopy was found to have delilah ulcer secondary to hiatal hernia.  Patient has no fever, chills, rigors    May 8, 2025: Patient continues to be on room air.  Last INR was 1.5.  Patient is potentially for discharge tomorrow.  Hemoglobin continues to slowly trend down.        Past Medical/Surgical History:    has a past medical history of Diabetes mellitus (Multi).   has no past surgical history on file.   reports that he has quit smoking. His smoking use included cigarettes. He has never used smokeless tobacco. No history on file for alcohol use and drug use.  Family History:   No relevant family history has been documented for this patient.    Allergies:     Patient has no known allergies.      Social history:   reports that he has quit smoking. His smoking use included cigarettes. He has never used smokeless tobacco.      Review of Systems:   General: denies weight gain,  denies loss of appetite, fever, chills, night sweats.  HEENT: denies headaches, dizziness, head trauma, visual changes, eye pain, tinnitus, nosebleeds, hoarseness or throat pain    Respiratory: denies chest pain, dyspnea, cough and hemoptysis  Cardiovascular: denies orthopnea, paroxysmal nocturnal dyspnea, leg swelling, and previous heart attack.    Gastrointestinal: denies pain, nausea vomiting, diarrhea, constipation, melena or bleeding.  Genitourinary: denies hematuria, frequency, urgency or dysuria  Neurology: denies syncope, seizures, paralysis, paraesthesia   Endocrine: denies polyuria, polydipsia, skin or hair changes, and heat or cold intolerance  Musculoskeletal: denies joint pain, swelling, arthritis or myalgia  Hematologic: denies bleeding, adenopathy and easy bruising  Skin: denies rashes and skin discoloration  Psychiatry: denies depression    Physical Exam:   Vital Signs:   Vitals:    05/08/25 1400   BP: 108/53   Pulse: 94   Resp: 18   Temp:    SpO2: 95%     Vitals:    05/06/25 0527   Weight: 78.5 kg (173 lb 1 oz)         Input/Output:    Intake/Output Summary (Last 24 hours) at 5/8/2025 1521  Last data filed at 5/8/2025 1003  Gross per 24 hour   Intake 50 ml   Output 770 ml   Net -720 ml       Oxygen requirements:    Ventilator Information:     Oxygen Therapy/Pulse Ox  Medical Gas Therapy: None (Room air)  SpO2: 95 %  Patient Activity During SpO2 Measurement: At rest  Temp: 36.5 °C (97.7 °F)        General appearance: Not in pain or distress, in no respiratory distress    HEENT: Atraumatic/normocephalic, EOMI, SABRA, pharynx clear, moist mucosa, redness of the uvula appreciated,   Neck: Supple, no jugular venous distension, lymphadenopathy, thyromegaly or carotid bruits  Chest: Rhonchi  Cardiovascular: Normal S1, S2, regular rate and rhythm, no murmur, rub or gallop  Abdomen: Normal sounds present, soft, lax with no tenderness, no hepatosplenomegaly, and no masses  Extremities: No edema. Pulses are  equally present.   Skin: intact, no rashes   Neurologic: Alert and oriented x 3, No focal deficit         Current Medications:   Scheduled medications  Scheduled Medications[1]  Continuous medications  Continuous Medications[2]  PRN medications  PRN Medications[3]      Investigations:  Labs, radiological imaging and cardiac work up were personally reviewed    Results from last 7 days   Lab Units 05/08/25  0706 05/07/25  0726 05/06/25  0654   SODIUM mmol/L 134* 135* 136   POTASSIUM mmol/L 3.5 3.6 3.6   CHLORIDE mmol/L 96* 99 97*   CO2 mmol/L 30 28 30   BUN mg/dL 26* 30* 35*   CREATININE mg/dL 1.60* 1.57* 1.67*   GLUCOSE mg/dL 179* 168* 179*   CALCIUM mg/dL 8.1* 8.1* 8.8     Results from last 7 days   Lab Units 05/08/25  0706   WBC AUTO x10*3/uL 10.6   HEMOGLOBIN g/dL 8.0*   HEMATOCRIT % 25.5*   PLATELETS AUTO x10*3/uL 220         Imaging  XR chest 1 view  Result Date: 5/3/2025  1. New scattered bilateral lower lobe predominant nodular infiltrates could be infectious. Finding could be further assessed by dedicated chest CT scan.   Signed by: Cleve Cardenas 5/3/2025 2:11 PM Dictation workstation:   [id number]      Cardiology, Vascular, and Other Imaging  Esophagogastroduodenoscopy (EGD)  Result Date: 5/7/2025  Table formatting from the original result was not included. Impression Irregular Z-line Medium type I hiatal hernia with Maximino lesions present Mild erythematous mucosa in the antrum, consistent with gastritis; performed cold forceps biopsy to rule out H. pylori The duodenum appeared normal. Findings Irregular Z-line Medium sliding hiatal hernia (type I hiatal hernia) with Maximino lesions present, confirmed by retroflexion. Hill grade III hiatal hernia Mild erythematous mucosa in the antrum, consistent with gastritis; performed cold forceps biopsy to rule out H. pylori The duodenum appeared normal. Recommendation Await pathology results Continue PPI therapy Proceed with colonoscopy  Indication Dark stools,  Anemia, unspecified type, On warfarin for atrial fibrillation (Multi) Post-Op Diagnosis None Staff Staff Role Cleve Greene MD Proceduralist Medications See Anesthesia Record. Preprocedure A history and physical has been performed, and patient medication allergies have been reviewed. The patient's tolerance of previous anesthesia has been reviewed. The risks and benefits of the procedure and the sedation options and risks were discussed with the patient. All questions were answered and informed consent obtained. Details of the Procedure The patient underwent monitored anesthesia care, which was administered by an anesthesia professional. The patient's blood pressure, ECG, ETCO2, heart rate, level of consciousness, oxygen and respirations were monitored throughout the procedure. The scope was introduced through the mouth and advanced to the second part of the duodenum. Retroflexion was performed in the cardia. Prior to the procedure, the patient's H. Pylori status was unknown. The patient experienced no blood loss. The procedure was not difficult. The patient tolerated the procedure well. There were no apparent adverse events. Events Procedure Events Event Event Time ENDO SCOPE IN TIME 5/7/2025 12:24 PM ENDO SCOPE OUT TIME 5/7/2025 12:28 PM ENDO SCOPE IN TIME 5/7/2025 12:33 PM ENDO CECUM REACHED 5/7/2025 12:36 PM Specimens ID Type Source Tests Collected by Time 1 : COLD BX Tissue STOMACH ANTRUM BIOPSY SURGICAL PATHOLOGY EXAM Cleve Greene MD 5/7/2025 1227 Procedure Location [facility] [address] [place] [facility] [address] [phone] Referring Provider Marita Moreno, APRN-CNP Procedure Provider Cleve Greene MD     Colonoscopy Diagnostic  Result Date: 5/7/2025  Table formatting from the original result was not included. Impression Examination limited by quality of prep. 6 polyps in the ascending colon, transverse colon and descending colon. Not removed due to indication of  examination and overall health No etiology for anemia identified. Based on findings of EGD, suspect anemia may be due to intermittent delilah ulcers secondary to hiatal hernia. Diverticulosis in the sigmoid colon Findings Six polyps measuring smaller than 5 mm in the ascending colon, transverse colon and descending colon Few diverticula in the sigmoid colon  Recommendation Follow up with primary gastroenterologist PPI therapy definitely Resume prior diet Watch for complications  Indication Dark stools, Anemia, unspecified type, On warfarin for atrial fibrillation (Multi) Post-Op Diagnosis None Staff Staff Role Cleve Greene MD Proceduralist Medications See Anesthesia Record. Preprocedure A history and physical has been performed, and patient medication allergies have been reviewed. The patient's tolerance of previous anesthesia has been reviewed. The risks and benefits of the procedure and the sedation options and risks were discussed with the patient. All questions were answered and informed consent obtained. Details of the Procedure The patient underwent monitored anesthesia care, which was administered by an anesthesia professional. The patient's blood pressure, ECG, ETCO2, heart rate, level of consciousness, oxygen and respirations were monitored throughout the procedure. A digital rectal exam was performed. The scope was introduced through the anus and advanced to the cecum. Retroflexion was performed in the rectum. The quality of bowel preparation was evaluated using the [place] Bowel Preparation Scale with scores of: right colon = 2, transverse colon = 2, left colon = 1. The total BBPS score was 5. Bowel prep was not adequate. The patient experienced no blood loss. The procedure was not difficult. The patient tolerated the procedure well. There were no apparent adverse events. Events Procedure Events Event Event Time ENDO SCOPE IN TIME 5/7/2025 12:24 PM ENDO SCOPE OUT TIME 5/7/2025 12:28 PM ENDO SCOPE IN TIME  5/7/2025 12:33 PM ENDO CECUM REACHED 5/7/2025 12:36 PM ENDO SCOPE OUT TIME 5/7/2025 12:46 PM Specimens ID Type Source Tests Collected by Time 1 : COLD BX Tissue STOMACH ANTRUM BIOPSY SURGICAL PATHOLOGY EXAM Cleve Greene MD 5/7/2025 1227 Procedure Location [facility] [address] [facility] [address] [phone] Referring Provider Marita Moreno, APRN-CNP Procedure Provider MD Marita Pollard     ECG 12 lead  Result Date: 5/5/2025  Sinus or ectopic atrial rhythm Prolonged HI interval Right bundle branch block        Assessment  Saul Asif IS 89 y.o. male  on day  4 of admission presenting with Anemia, unspecified type. Actively being treated for the  following medical Problems:    Bibasilar pulm infiltrates, no fever or leukocytosis, recent COVID infection  Possible community-acquired pneumonia versus remanence of recent pneumonia on the chest x-ray  Anemia with hemoglobin less than 7  Multifactorial shortness of breath  CKD  A-fib on anticoagulation  Aortic stenosis    Recommendation:    Continue with ceftriaxone and azithromycin for 5 days  INR is 1.5 which covers for DVT prophylaxis.  Viral panels were negative  Patient is not requiring oxygen, oxygen for saturation of 89 to 94%  Incentive spirometry  Bronchodilators therapy as needed  PT/OT  DVT prophylaxis  Minimize benzodiazepine and narcotics  Encourage ambulation  Head of bed elevation and aspiration precautions.  Ipratropium every 8 hours      Muriel Moreau MD  Pulmonary critical care consultant  05/08/25  3:21 PM       STAFF PHYSICIAN NOTE OF PERSONAL INVOLVEMENT IN CARE  As the attending physician, I certify that I personally reviewed the patient's history and personally examined the patient to confirm the physical findings described above, and that I reviewed the relevant imaging studies and available reports.  I also discussed the differential diagnosis and all of the proposed  management plans with the patient and individuals accompanying the patient to this visit.  They had the opportunity to ask questions about the proposed management plans and to have those questions answered.           [1] atorvastatin, 10 mg, oral, Nightly  azithromycin, 500 mg, intravenous, q24h  bisacodyl, 5 mg, oral, Daily  brimonidine, 1 drop, Both Eyes, BID  cefTRIAXone, 2 g, intravenous, q24h  cholecalciferol, 25 mcg, oral, Daily  ferrous sulfate, 1 tablet, oral, Nightly  guaiFENesin, 600 mg, oral, BID  insulin glargine, 10 Units, subcutaneous, Nightly  insulin lispro, 0-10 Units, subcutaneous, TID AC  ipratropium, 0.5 mg, nebulization, TID  levothyroxine, 100 mcg, oral, Daily  pantoprazole, 40 mg, intravenous, Daily  polyethylene glycol, 17 g, oral, Daily  sennosides-docusate sodium, 1 tablet, oral, Nightly  torsemide, 20 mg, oral, Daily  traZODone, 25 mg, oral, Nightly  [Held by provider] warfarin, 2.5 mg, oral, Daily     [2]    [3] PRN medications: acetaminophen **OR** acetaminophen **OR** acetaminophen, dextrose, dextrose, glucagon, glucagon, ipratropium-albuteroL, melatonin, oxygen, polyethylene glycol

## 2025-05-08 NOTE — CARE PLAN
The patient's goals for the shift include rest    The clinical goals for the shift include Patient will remain safe and free from falls during shift    Problem: Diabetes  Goal: Achieve decreasing blood glucose levels by end of shift  Outcome: Progressing  Goal: Increase stability of blood glucose readings by end of shift  Outcome: Progressing  Goal: Decrease in ketones present in urine by end of shift  Outcome: Progressing  Goal: Maintain electrolyte levels within acceptable range throughout shift  Outcome: Progressing  Goal: Maintain glucose levels >70mg/dl to <250mg/dl throughout shift  Outcome: Progressing  Goal: No changes in neurological exam by end of shift  Outcome: Progressing  Goal: Learn about and adhere to nutrition recommendations by end of shift  Outcome: Progressing  Goal: Vital signs within normal range for age by end of shift  Outcome: Progressing  Goal: Increase self care and/or family involovement by end of shift  Outcome: Progressing  Goal: Receive DSME education by end of shift  Outcome: Progressing

## 2025-05-08 NOTE — PROGRESS NOTES
25 0913   Discharge Planning   Living Arrangements Other (Comment)  ([facility])   Support Systems Children;Family members   Assistance Needed None   Type of Residence Assisted living   Do you have animals or pets at home? No   Care Facility Name [facility]   Who is requesting discharge planning? Provider   Home or Post Acute Services Other (Comment)   Type of Post Acute Facility Services Assisted living   Expected Discharge Disposition Home   Does the patient need discharge transport arranged? Yes   RoundTrip coordination needed? Yes     TCC Note: Met with Liaison from [facility]. Pt will be accepted back pending Medical Stability. Met with pt. at bedside, introduced self and role on the Transition of Care Team.  Verified name, , demographics, insurance, PCP is Emergency contact is daughter, Ivelisse Blanton Phone: [phone]. Pt. [facility] at [facility]. Pt. is Independent with ADLs and uses a cane. He is diabetic and BS gets checked per facility. All meds given through facility.  Pt. denies any recent falls. Pt. states that he has no discharge needs at this time. Will return to [facility] at discharge. Transitions of Care will continue to follow until discharge. Ruth Snow, MSN, RN, TCC.

## 2025-05-08 NOTE — CARE PLAN
The patient's goals for the shift include rest    The clinical goals for the shift include Patient will remain free from falls/injuries during this shift.   Problem: Diabetes  Goal: Achieve decreasing blood glucose levels by end of shift  Outcome: Progressing  Goal: Increase stability of blood glucose readings by end of shift  Outcome: Progressing  Goal: Decrease in ketones present in urine by end of shift  Outcome: Progressing  Goal: Maintain electrolyte levels within acceptable range throughout shift  Outcome: Progressing  Goal: Maintain glucose levels >70mg/dl to <250mg/dl throughout shift  Outcome: Progressing  Goal: No changes in neurological exam by end of shift  Outcome: Progressing  Goal: Learn about and adhere to nutrition recommendations by end of shift  Outcome: Progressing  Goal: Vital signs within normal range for age by end of shift  Outcome: Progressing  Goal: Increase self care and/or family involovement by end of shift  Outcome: Progressing  Goal: Receive DSME education by end of shift  Outcome: Progressing

## 2025-05-08 NOTE — PROGRESS NOTES
Saul Asif is a 89 y.o. male on day 3 of admission presenting with Anemia, unspecified type.      Subjective   Patient underwent EGD. Patient seen and examined at bedside. No complaints. He does not feel ready for discharge.        Objective     Last Recorded Vitals  /57   Pulse 88   Temp 36.5 °C (97.7 °F)   Resp 20   Wt 78.5 kg (173 lb 1 oz)   SpO2 97%   Intake/Output last 3 Shifts:    Intake/Output Summary (Last 24 hours) at 5/7/2025 2239  Last data filed at 5/7/2025 1901  Gross per 24 hour   Intake 50 ml   Output 320 ml   Net -270 ml       Admission Weight  Weight: 81.6 kg (180 lb) (05/03/25 1341)    Daily Weight  05/06/25 : 78.5 kg (173 lb 1 oz)    Image Results  Esophagogastroduodenoscopy (EGD)  Table formatting from the original result was not included.  Impression  Irregular Z-line  Medium type I hiatal hernia with Maximino lesions present  Mild erythematous mucosa in the antrum, consistent with gastritis;   performed cold forceps biopsy to rule out H. pylori  The duodenum appeared normal.    Findings  Irregular Z-line  Medium sliding hiatal hernia (type I hiatal hernia) with Maximino lesions   present, confirmed by retroflexion. Hill grade III hiatal hernia  Mild erythematous mucosa in the antrum, consistent with gastritis;   performed cold forceps biopsy to rule out H. pylori  The duodenum appeared normal.    Recommendation  Await pathology results   Continue PPI therapy  Proceed with colonoscopy       Indication  Dark stools, Anemia, unspecified type, On warfarin for atrial fibrillation   (Multi)    Post-Op Diagnosis  None    Staff  Staff Role   Cleve Greene MD Proceduralist     Medications  See Anesthesia Record.     Preprocedure  A history and physical has been performed, and patient medication   allergies have been reviewed. The patient's tolerance of previous   anesthesia has been reviewed. The risks and benefits of the procedure and   the sedation options and risks were discussed with the  patient. All   questions were answered and informed consent obtained.    Details of the Procedure  The patient underwent monitored anesthesia care, which was administered by   an anesthesia professional. The patient's blood pressure, ECG, ETCO2,   heart rate, level of consciousness, oxygen and respirations were monitored   throughout the procedure. The scope was introduced through the mouth and   advanced to the second part of the duodenum. Retroflexion was performed in   the cardia. Prior to the procedure, the patient's H. Pylori status was   unknown. The patient experienced no blood loss. The procedure was not   difficult. The patient tolerated the procedure well. There were no   apparent adverse events.     Events  Procedure Events   Event Event Time   ENDO SCOPE IN TIME 5/7/2025 12:24 PM   ENDO SCOPE OUT TIME 5/7/2025 12:28 PM   ENDO SCOPE IN TIME 5/7/2025 12:33 PM   ENDO CECUM REACHED 5/7/2025 12:36 PM     Specimens  ID Type Source Tests Collected by Time   1 : COLD BX Tissue STOMACH ANTRUM BIOPSY SURGICAL PATHOLOGY EXAM Cleve Greene MD 5/7/2025 1227     Procedure Location  [facility] [address]  [address] [address]  [phone]    Referring Provider  Marita Moreno, APRN-CNP    Procedure Provider  Cleve Greene MD  Colonoscopy Diagnostic  Table formatting from the original result was not included.  Impression  Examination limited by quality of prep.  6 polyps in the ascending colon, transverse colon and descending colon.   Not removed due to indication of examination and overall health  No etiology for anemia identified. Based on findings of EGD, suspect   anemia may be due to intermittent delilah ulcers secondary to hiatal   hernia.   Diverticulosis in the sigmoid colon    Findings  Six polyps measuring smaller than 5 mm in the ascending colon, transverse   colon and descending colon  Few diverticula in the sigmoid colon       Recommendation  Follow up with  primary gastroenterologist   PPI therapy definitely  Resume prior diet  Watch for complications         Indication  Dark stools, Anemia, unspecified type, On warfarin for atrial fibrillation   (Multi)    Post-Op Diagnosis  None    Staff  Staff Role   Cleve Greene MD Proceduralist     Medications  See Anesthesia Record.     Preprocedure  A history and physical has been performed, and patient medication   allergies have been reviewed. The patient's tolerance of previous   anesthesia has been reviewed. The risks and benefits of the procedure and   the sedation options and risks were discussed with the patient. All   questions were answered and informed consent obtained.    Details of the Procedure  The patient underwent monitored anesthesia care, which was administered by   an anesthesia professional. The patient's blood pressure, ECG, ETCO2,   heart rate, level of consciousness, oxygen and respirations were monitored   throughout the procedure. A digital rectal exam was performed. The scope   was introduced through the anus and advanced to the cecum. Retroflexion   was performed in the rectum. The quality of bowel preparation was   evaluated using the Thompson Bowel Preparation Scale with scores of: right   colon = 2, transverse colon = 2, left colon = 1. The total BBPS score was   5. Bowel prep was not adequate. The patient experienced no blood loss. The   procedure was not difficult. The patient tolerated the procedure well.   There were no apparent adverse events.     Events  Procedure Events   Event Event Time   ENDO SCOPE IN TIME 5/7/2025 12:24 PM   ENDO SCOPE OUT TIME 5/7/2025 12:28 PM   ENDO SCOPE IN TIME 5/7/2025 12:33 PM   ENDO CECUM REACHED 5/7/2025 12:36 PM   ENDO SCOPE OUT TIME 5/7/2025 12:46 PM     Specimens  ID Type Source Tests Collected by Time   1 : COLD BX Tissue STOMACH ANTRUM BIOPSY SURGICAL PATHOLOGY EXAM Cleve Greene MD 5/7/2025 1227     Procedure Location  [facility]  [place] [address]  [address] [address]  [phone]    Referring Provider  KISHA Davalos-CNP    Procedure Provider  MD Marita Pollard      Physical Exam  Constitutional:       General: He is not in acute distress.     Appearance: Normal appearance. He is not toxic-appearing.   HENT:      Head: Normocephalic and atraumatic.      Mouth/Throat:      Pharynx: Oropharynx is clear.   Eyes:      Conjunctiva/sclera: Conjunctivae normal.   Cardiovascular:      Rate and Rhythm: Normal rate and regular rhythm.   Pulmonary:      Effort: Pulmonary effort is normal.      Breath sounds: No wheezing.      Comments: Coarse breath sounds bilaterally.  Currently on room air, SpO2 93%.  Tachypneic.  Abdominal:      General: Bowel sounds are normal. There is no distension.      Palpations: Abdomen is soft.      Tenderness: There is no abdominal tenderness.   Musculoskeletal:         General: Normal range of motion.      Right lower leg: No edema.      Left lower leg: No edema.   Skin:     General: Skin is warm and dry.   Neurological:      Mental Status: He is alert and oriented to person, place, and time.   Psychiatric:         Behavior: Behavior normal.      Relevant Results                              Assessment & Plan  Anemia, unspecified type    Supratherapeutic INR    Productive cough    Shortness of breath    Generalized weakness    Saul Asif is a 89 y.o. male presents to the ED from facility for evaluation of low hemoglobin level.  Hemoglobin level 6.3 this morning on lab work at facility.  Patient reports extreme fatigue over the past 2 days.  Also notes dark stools however denies noticing any blood in his stools.  Denies any blood in his urine or coughing up any blood.  Has had blood transfusions in the past.  Reports some generalized weakness over the past couple days and generally not feeling very well.  Patient's family also notes shortness of breath and productive cough over  the past week, granddaughter stating his breathing sounded horrible last week and when she was visiting him.  States the facility did a chest x-ray at that time which was unremarkable.      CODE STATUS: DNR, DNI     #Acute on chronic anemia  #Supratherapeutic INR  #Atrial fibrillation on Coumadin  #Generalized weakness/fatigue  Admit for observation with telemetry monitoring  -Currently taking 2.5 mg Coumadin daily for history of atrial fibrillation.  Patient does report dark stools.  PT level greater than 100, unable to calculate INR level.  2 units RBCs ordered in the ED  Repeat H&H ordered after transfusion, continue to trend, transfuse as needed for hemoglobin less than 7  5 mg IV vitamin K ordered  Stool occult blood ordered  40 mg IV Protonix daily  Monitor daily PT/INR level  Q 4 vitals  CBC, BMP in the a.m.  Cardiac/diabetic/renal diet  Fall precautions, PT/OT for evaluation and treatment  Social work consult for discharge planning     #Suspect pneumonia  #Productive cough  #Intermittent shortness of breath, worse over the past week  Chest x-ray shows new scattered bilateral lower lobe predominant nodular infiltrates  Patient afebrile, no leukocytosis.  Urine pneumonia antigens ordered, procalcitonin ordered  IV Rocephin and IV azithromycin ordered  Cough medication, breathing treatments, oxygen as needed  COVID, flu, RSV ordered     #Diabetes mellitus  Hold home oral antidiabetic medications  Sliding scale insulin  ACHS Accu-Cheks  Tresiba 8 units daily     Continue home medications as appropriate     Chronic conditions:  A-fib, CKD, HLD, hypothyroidism, prostate cancer, aortic valve stenosis, type II DM     #DVT prophylaxis  Hold home oral anticoagulation  SCDs, ambulation as tolerated     5/7: EGD showed Maximino ulcers secondary to a hiatal hernia. Colonoscopy showed 6 polyps in the ascending, transverse and descending colon, not removed due to examination.  Patient will need PPI indefinitely. Follow  biopsy results. Hemoglobin stable at 8.3. Will need a 5 day course of antibiotics. Can possibly discharge tomorrow.           Brandon Estrella, DO

## 2025-05-08 NOTE — PROGRESS NOTES
Subjective   Saul Asif is a 89 y.o. male on day 1 of admission presenting with Anemia, unspecified type.          Sameer Weaver MD   Physician  Internal Medicine     H&P      Signed     Date of Service: 5/3/2025  5:40 PM     Signed       Expand All Collapse All    History Of Present Illness  Saul Asif is a 89 y.o. male with past medical history significant for atrial fibrillation on Coumadin, CKD, HLD, hypothyroidism, prostate cancer, aortic valve stenosis, and type 2 diabetes mellitus who presents to the ED from facility for evaluation of low hemoglobin level.  Hemoglobin level 6.3 this morning on lab work at facility.  Patient reports extreme fatigue over the past 2 days.  Also notes dark stools however denies noticing any blood in his stools.  Denies any blood in his urine or coughing up any blood.  Has had blood transfusions in the past.  Reports some generalized weakness over the past couple days and generally not feeling very well.  Patient's family also notes shortness of breath and productive cough over the past week, granddaughter stating his breathing sounded horrible last week and when she was visiting him.  States the facility did a chest x-ray at that time which was unremarkable.  Patient still reports congestion, coughing up yellow phlegm and intermittent shortness of breath.  Denies any fevers or chills.  Denies headaches, dizziness, chest pains, abdominal pain or nausea, appetite changes, or urinary changes.  Says he had COVID at the beginning of the year which was very hard on him.     ED course: On arrival to the ED, patient afebrile and hemodynamically stable with SpO2 92% on room air.  Glucose 240, electrolytes WNL, BUN 55/creatinine 1.6, LFTs WNL, troponin 13.  PT greater than 100, unable to calculate INR level.  WBC 10, hemoglobin 6.3/hematocrit 20.8, platelets 241.  Chest x-ray shows new scattered bilateral lower lobe predominant nodular infiltrates could be infectious.     EKG  "(interpreted by ED physician): Sinus rhythm with prolonged MO interval, rate of 85, RBBB, no ST segment depression or elevation consistent with ischemia or infarction.           Past Medical History  Medical History     Surgical History  Surgical History     Social History  He reports that he has quit smoking. His smoking use included cigarettes. He has never used smokeless tobacco. No history on file for alcohol use and drug use.     Family History  Family History     Allergies  Patient has no known allergies.     Review of Systems   All other systems reviewed and are negative.     10 point review system negative except as noted above in HPI     Physical Exam  Vitals and nursing note reviewed.   Constitutional:       General: He is not in acute distress.     Appearance: Normal appearance. He is not toxic-appearing.   HENT:      Head: Normocephalic and atraumatic.      Mouth/Throat:      Pharynx: Oropharynx is clear.   Eyes:      Conjunctiva/sclera: Conjunctivae normal.   Cardiovascular:      Rate and Rhythm: Normal rate and regular rhythm.   Pulmonary:      Effort: Pulmonary effort is normal.      Breath sounds: No wheezing.      Comments: Coarse breath sounds bilaterally.  Currently on room air, SpO2 93%.  Tachypneic.  Abdominal:      General: Bowel sounds are normal. There is no distension.      Palpations: Abdomen is soft.      Tenderness: There is no abdominal tenderness.   Musculoskeletal:         General: Normal range of motion.      Right lower leg: No edema.      Left lower leg: No edema.   Skin:     General: Skin is warm and dry.   Neurological:      Mental Status: He is alert and oriented to person, place, and time.   Psychiatric:         Behavior: Behavior normal.         Last Recorded Vitals  Blood pressure 108/56, pulse 72, temperature 36 °C (96.8 °F), resp. rate 16, height 1.753 m (5' 9.02\"), weight 81.6 kg (180 lb), SpO2 95%.     Relevant Results        Results for orders placed or performed during " the hospital encounter of 05/03/25 (from the past 24 hours)   CBC and Auto Differential   Result Value Ref Range     WBC 10.0 4.4 - 11.3 x10*3/uL     nRBC 0.2 (H) 0.0 - 0.0 /100 WBCs     RBC 2.03 (L) 4.50 - 5.90 x10*6/uL     Hemoglobin 6.3 (LL) 13.5 - 17.5 g/dL     Hematocrit 20.8 (L) 41.0 - 52.0 %      (H) 80 - 100 fL     MCH 31.0 26.0 - 34.0 pg     MCHC 30.3 (L) 32.0 - 36.0 g/dL     RDW 15.3 (H) 11.5 - 14.5 %     Platelets 241 150 - 450 x10*3/uL     Neutrophils % 78.6 40.0 - 80.0 %     Immature Granulocytes %, Automated 0.7 0.0 - 0.9 %     Lymphocytes % 11.9 13.0 - 44.0 %     Monocytes % 6.2 2.0 - 10.0 %     Eosinophils % 2.2 0.0 - 6.0 %     Basophils % 0.4 0.0 - 2.0 %     Neutrophils Absolute 7.89 (H) 1.60 - 5.50 x10*3/uL     Immature Granulocytes Absolute, Automated 0.07 0.00 - 0.50 x10*3/uL     Lymphocytes Absolute 1.19 0.80 - 3.00 x10*3/uL     Monocytes Absolute 0.62 0.05 - 0.80 x10*3/uL     Eosinophils Absolute 0.22 0.00 - 0.40 x10*3/uL     Basophils Absolute 0.04 0.00 - 0.10 x10*3/uL   Comprehensive metabolic panel   Result Value Ref Range     Glucose 240 (H) 74 - 99 mg/dL     Sodium 139 136 - 145 mmol/L     Potassium 4.3 3.5 - 5.3 mmol/L     Chloride 103 98 - 107 mmol/L     Bicarbonate 28 21 - 32 mmol/L     Anion Gap 12 10 - 20 mmol/L     Urea Nitrogen 55 (H) 6 - 23 mg/dL     Creatinine 1.60 (H) 0.50 - 1.30 mg/dL     eGFR 40 (L) >60 mL/min/1.73m*2     Calcium 9.6 8.6 - 10.3 mg/dL     Albumin 3.6 3.4 - 5.0 g/dL     Alkaline Phosphatase 65 33 - 136 U/L     Total Protein 6.3 (L) 6.4 - 8.2 g/dL     AST 17 9 - 39 U/L     Bilirubin, Total 0.3 0.0 - 1.2 mg/dL     ALT 20 10 - 52 U/L   Protime-INR   Result Value Ref Range     Protime >100.0 (HH) 9.8 - 12.4 seconds     INR       Troponin I, High Sensitivity   Result Value Ref Range     Troponin I, High Sensitivity 13 0 - 20 ng/L   Type and screen   Result Value Ref Range     ABO TYPE AB       Rh TYPE POS       ANTIBODY SCREEN NEG     Magnesium   Result Value  Ref Range     Magnesium 1.93 1.60 - 2.40 mg/dL   Verify ABO/Rh Group Test (VERAB)   Result Value Ref Range     ABO TYPE AB       Rh TYPE POS     Prepare RBC: 2 Units   Result Value Ref Range     PRODUCT CODE F0118J26       Unit Number [id number]       Unit ABO A       Unit RH POS       XM INTEP COMP       Dispense Status TR       Blood Expiration Date 5/23/2025 11:59:00 PM EDT       PRODUCT BLOOD TYPE 6200       UNIT VOLUME 350       PRODUCT CODE Z9437A36       Unit Number [id number]       Unit ABO A       Unit RH POS       XM INTEP COMP       Dispense Status IS       Blood Expiration Date 5/23/2025 11:59:00 PM EDT       PRODUCT BLOOD TYPE 6200       UNIT VOLUME 350     Sars-CoV-2, Influenza A/B and RSV PCR   Result Value Ref Range     Coronavirus 2019, PCR Not Detected Not Detected     Flu A Result Not Detected Not Detected     Flu B Result Not Detected Not Detected     RSV PCR Not Detected Not Detected   Legionella Antigen, Urine     Specimen: Clean Catch/Voided; Urine   Result Value Ref Range     L. pneumophila Urine Ag Negative Negative   Streptococcus pneumoniae Antigen, Urine     Specimen: Clean Catch/Voided; Urine   Result Value Ref Range     Streptococcus pneumoniae Ag, Urine Negative Negative   POCT GLUCOSE   Result Value Ref Range     POCT Glucose 279 (H) 74 - 99 mg/dL   POCT GLUCOSE   Result Value Ref Range     POCT Glucose 227 (H) 74 - 99 mg/dL   Hemoglobin and hematocrit, blood   Result Value Ref Range     Hemoglobin 8.8 (L) 13.5 - 17.5 g/dL     Hematocrit 28.0 (L) 41.0 - 52.0 %   CBC   Result Value Ref Range     WBC 8.2 4.4 - 11.3 x10*3/uL     nRBC 0.4 (H) 0.0 - 0.0 /100 WBCs     RBC 2.89 (L) 4.50 - 5.90 x10*6/uL     Hemoglobin 8.8 (L) 13.5 - 17.5 g/dL     Hematocrit 28.0 (L) 41.0 - 52.0 %     MCV 97 80 - 100 fL     MCH 30.4 26.0 - 34.0 pg     MCHC 31.4 (L) 32.0 - 36.0 g/dL     RDW 16.9 (H) 11.5 - 14.5 %     Platelets 211 150 - 450 x10*3/uL   Basic metabolic panel   Result Value Ref Range      Glucose 243 (H) 74 - 99 mg/dL     Sodium 137 136 - 145 mmol/L     Potassium 3.9 3.5 - 5.3 mmol/L     Chloride 99 98 - 107 mmol/L     Bicarbonate 29 21 - 32 mmol/L     Anion Gap 13 10 - 20 mmol/L     Urea Nitrogen 56 (H) 6 - 23 mg/dL     Creatinine 1.67 (H) 0.50 - 1.30 mg/dL     eGFR 38 (L) >60 mL/min/1.73m*2     Calcium 9.1 8.6 - 10.3 mg/dL   Protime-INR   Result Value Ref Range     Protime 16.5 (H) 9.8 - 12.4 seconds     INR 1.5 (H) 0.9 - 1.1   POCT GLUCOSE   Result Value Ref Range     POCT Glucose 227 (H) 74 - 99 mg/dL      XR chest 1 view  Result Date: 5/3/2025  Interpreted By:  Cleve Cardenas, STUDY: XR CHEST 1 VIEW; 5/3/2025 2:02 pm   INDICATION: Signs/Symptoms:anemia.   COMPARISON: Chest x-ray 12/07/2024   ACCESSION NUMBER(S): [id number]   ORDERING CLINICIAN: SUMAN HENRIQUEZ   TECHNIQUE: 1 view of the chest was performed.   FINDINGS: Indeterminate bilateral lower lobe nodular infiltrates could be infectious. No pleural effusion. No pneumothorax.  The cardiomediastinal silhouette is within normal limits. Bilateral shoulder joint degenerative changes.        1. New scattered bilateral lower lobe predominant nodular infiltrates could be infectious. Finding could be further assessed by dedicated chest CT scan.   Signed by: Cleve Cardenas 5/3/2025 2:11 PM Dictation workstation:   [id number]        Assessment & Plan  Anemia, unspecified type     Supratherapeutic INR     Pneumonia     Productive cough     Shortness of breath     Generalized weakness        Saul Asif is a 89 y.o. male presents to the ED from facility for evaluation of low hemoglobin level.  Hemoglobin level 6.3 this morning on lab work at facility.  Patient reports extreme fatigue over the past 2 days.  Also notes dark stools however denies noticing any blood in his stools.  Denies any blood in his urine or coughing up any blood.  Has had blood transfusions in the past.  Reports some generalized weakness over the past couple days and generally not  feeling very well.  Patient's family also notes shortness of breath and productive cough over the past week, granddaughter stating his breathing sounded horrible last week and when she was visiting him.  States the facility did a chest x-ray at that time which was unremarkable.      CODE STATUS: DNR, DNI     #Acute on chronic anemia  #Supratherapeutic INR  #Atrial fibrillation on Coumadin  #Generalized weakness/fatigue  Admit for observation with telemetry monitoring  -Currently taking 2.5 mg Coumadin daily for history of atrial fibrillation.  Patient does report dark stools.  PT level greater than 100, unable to calculate INR level.  2 units RBCs ordered in the ED  Repeat H&H ordered after transfusion, continue to trend, transfuse as needed for hemoglobin less than 7  5 mg IV vitamin K ordered  Stool occult blood ordered  40 mg IV Protonix daily  Monitor daily PT/INR level  Q 4 vitals  CBC, BMP in the a.m.  Cardiac/diabetic/renal diet  Fall precautions, PT/OT for evaluation and treatment  Social work consult for discharge planning     #Suspect pneumonia  #Productive cough  #Intermittent shortness of breath, worse over the past week  Chest x-ray shows new scattered bilateral lower lobe predominant nodular infiltrates  Patient afebrile, no leukocytosis.  Urine pneumonia antigens ordered, procalcitonin ordered  IV Rocephin and IV azithromycin ordered  Cough medication, breathing treatments, oxygen as needed  COVID, flu, RSV ordered     #Diabetes mellitus  Hold home oral antidiabetic medications  Sliding scale insulin  ACHS Accu-Cheks  Tresiba 8 units daily     Continue home medications as appropriate     Chronic conditions:  A-fib, CKD, HLD, hypothyroidism, prostate cancer, aortic valve stenosis, type II DM     #DVT prophylaxis  Hold home oral anticoagulation  SCDs, ambulation as tolerated           had concerns including Weakness, Gen (Pt BIBA with c/o generalized weakness and fatigue x2 days. Pt had labs drawn at [facility] which revealed a Hgb of 6.3, pt endorses dark stools. Alert and [place]. Pt is also on coumadin. Denies CP or SOB. ).        Problem List Items Addressed This Visit         Pneumonia     * (Principal) Anemia, unspecified type - Primary      Other Visit Diagnoses           On warfarin for atrial fibrillation (Multi)           Dark stools           Elevated protime                    HPI         Weakness, Gen     Additional comments: Pt BIBA with c/o generalized weakness and fatigue x2 days. [facility] revealed a Hgb of 6.3, pt endorses dark stools. Alert and [place]. Pt is also on coumadin. Denies CP or SOB.             Last edited by Deepthi Olivia RN on 5/3/2025  1:40 PM.             Problem List as of 5/4/2025 Reviewed: 2/27/2025 10:45 AM by Matthew Tubbs MD        Abdominal bloating     Abdominal pain     Abnormal chest xray     Acquired hypothyroidism     Asymptomatic cholelithiasis     Atrial fibrillation (Multi)     Kraus's esophagus     Diabetes mellitus (Multi)     Gastric ulcer     Helicobacter pylori (H. pylori) infection     HH (hiatus hernia)     History of colon polyps     Hyperlipidemia     Iron deficiency anemia     Irritable bowel syndrome     Leg joint pain     Severe aortic stenosis     Last Assessment & Plan 2/27/2025 Office Visit Written 2/27/2025 10:49 AM by Matthew Tubbs MD   2/27/25 echocqrdiogram svere aortic stenosis and moderate AI LVEF = 65-70%, severe MAC with moderate MR,  Tr with midly elevated RVSP (reviewed today)           Muscular pain     Personal history of Helicobacter infection     Pneumonia     Presence of artificial knee joint     Right hip pain     Tubular adenoma     Stage 4 chronic kidney disease (Multi)     Paroxysmal atrial fibrillation (Multi)     Prostate cancer (Multi)     Squamous cell cancer of skin of left cheek     Secondary hyperparathyroidism of renal origin (Multi)     RBBB     * (Principal) Anemia, unspecified type      Supratherapeutic INR     Productive cough     Shortness of breath     Generalized weakness              Active Ambulatory Problems     Diagnosis Date Noted    Abdominal bloating 10/12/2023    Abdominal pain 10/12/2023    Abnormal chest xray 10/12/2023    Acquired hypothyroidism 10/12/2023    Asymptomatic cholelithiasis 10/12/2023    Atrial fibrillation (Multi) 10/12/2023    Kraus's esophagus 10/12/2023    Diabetes mellitus (Multi) 10/12/2023    Gastric ulcer 10/12/2023    Helicobacter pylori (H. pylori) infection 10/12/2023    HH (hiatus hernia) 10/12/2023    History of colon polyps 10/12/2023    Hyperlipidemia 10/12/2023    Iron deficiency anemia 10/12/2023    Irritable bowel syndrome 10/12/2023    Leg joint pain 10/12/2023    Severe aortic stenosis 10/12/2023    Muscular pain 10/12/2023    Personal history of Helicobacter infection 10/12/2023    Pneumonia 10/12/2023    Presence of artificial knee joint 10/12/2023    Right hip pain 10/12/2023    Tubular adenoma 10/12/2023    Stage 4 chronic kidney disease (Multi) 10/18/2023    Paroxysmal atrial fibrillation (Multi) 02/15/2024    Prostate cancer (Multi) 11/09/2017    Squamous cell cancer of skin of left cheek 11/13/2017    Secondary hyperparathyroidism of renal origin (Multi) 04/23/2024    RBBB 02/27/2025           Resolved Ambulatory Problems     Diagnosis Date Noted    Obesity, Class I, BMI 30.0-34.9 (see actual BMI) 10/12/2023    COVID-19 12/27/2024    COVID 12/28/2024      No Additional Past Medical History                    Active Orders   Microbiology     Occult Blood, Stool       Frequency: Once       Number of Occurrences: 1 Occurrences   Lab     CBC       Frequency: AM draw       Number of Occurrences: 1 Occurrences     Comprehensive Metabolic Panel       Frequency: AM draw       Number of Occurrences: 1 Occurrences     Protime-INR       Frequency: AM draw       Number of Occurrences: 3 Days   Diet     Adult diet Cardiac, Renal, Consistent Carb; CCD 75  gm/meal; 70 gm fat; 2 - 3 grams Sodium; Potassium Restricted 2 gm (50mEq)       Frequency: Effective now       Number of Occurrences: Until Specified   Nursing     Activity (specify) Ambulate; Ambulate with Assistance; As Tolerated       Frequency: Until discontinued       Number of Occurrences: Until Specified     Apply sequential compression device       Frequency: Until discontinued       Number of Occurrences: Until Specified     Check pulse oximetry       Frequency: q8h       Number of Occurrences: Until Specified     Glucose 10-70 mg/dL & CONSCIOUS- Give 15 Grams of Carbohydrates and repeat until blood glucose level reaches 100 mg/dL or greater.       Frequency: Until discontinued       Number of Occurrences: Until Specified       Order Comments: Select 1 of the following:  -1 cup skim milk  -3 or 4 glucose tablets  -4 ounces of fruit juice or regular soda.  Patient MUST be CONSCIOUS and able to eat or drink        Notify provider       Frequency: Until discontinued       Number of Occurrences: Until Specified     Notify provider (specify parameters)       Frequency: Until discontinued       Number of Occurrences: Until Specified       Order Comments: [place] Hypoglycemia interventions.        Notify provider (specify parameters)       Frequency: Until discontinued       Number of Occurrences: Until Specified       Order Comments: For blood glucose greater than 200 mg/dL twice over 24 hours.  Provider to consider Endocrine Consult.        Notify provider (specify parameters)       Frequency: Until discontinued       Number of Occurrences: Until Specified     Pain Assessment       Frequency: q4h       Number of Occurrences: Until Specified     Pulse Oximetry       Frequency: Until discontinued       Number of Occurrences: Until Specified     Vital Signs       Frequency: Per unit standards       Number of Occurrences: Until Specified     Vital Signs       Frequency: q4h       Number of Occurrences: Until  Specified     Vital signs for transfusion       Frequency: Per unit standards       Number of Occurrences: Until Specified       Order Comments: Document required Vital Signs: Immediately prior to transfusion, within the first 15 minutes of transfusion, 45-60 minutes after infusion starts, and within 1 hour of completion of transfusion.        Vital signs for transfusion       Frequency: Per unit standards       Number of Occurrences: Until Specified       Order Comments: Document required Vital Signs: Immediately prior to transfusion, within the first 15 minutes of transfusion, 45-60 minutes after infusion starts, and within 1 hour of completion of transfusion.        Weight on admission       Frequency: Daily       Number of Occurrences: Until Specified   Code Status     DNR and No Intubation       Frequency: Continuous       Number of Occurrences: Until Specified   Consult     Inpatient consult to Social Work and TCC       Frequency: Once       Number of Occurrences: 1 Occurrences   Nourishments     May Participate in Room Service       Frequency: Once       Number of Occurrences: 1 Occurrences   OT     OT eval and treat       Frequency: Until therapy completed       Number of Occurrences: 1 Occurrences   PT     PT eval and treat       Frequency: Until therapy completed       Number of Occurrences: 1 Occurrences   Respiratory Care     Airway Clearance Techniques Device/modality: EzPap       Frequency: TID       Number of Occurrences: Until Specified     Respiratory care eval and treat       Frequency: Once       Number of Occurrences: 1 Occurrences       Order Comments: Triage 5  Due 5/6 0700      ECG     ECG 12 lead       Frequency: Once       Number of Occurrences: 1 Occurrences     Electrocardiogram, 12-lead PRN ACS symptoms       Frequency: PRN       Number of Occurrences: Until Specified       Order Comments: Notify provider if performed.      Precaution     Fall precautions       Frequency: Until  discontinued       Number of Occurrences: Until Specified   Point of Care Testing - Docked Device     POCT Glucose       Frequency: 4x daily - AC and at bedtime       Number of Occurrences: 3 Days       Order Comments: And PRN           POCT Glucose       Frequency: PRN       Number of Occurrences: Until Specified       Order Comments: PRN for hypoglycemia interventions instituted.  Retest blood glucose level every 15 minutes after every hypoglycemic intervention until blood glucose is greater than 100 mg/dL.         Telemetry     Telemetry monitoring - Floor only       Frequency: Until discontinued       Number of Occurrences: 3 Days   Medications     acetaminophen (Tylenol) oral liquid 650 mg       Linked Order: Or       Frequency: q4h PRN       Dose: 650 mg       Route: oral     acetaminophen (Tylenol) suppository 650 mg       Linked Order: Or       Frequency: q4h PRN       Dose: 650 mg       Route: rectal     acetaminophen (Tylenol) tablet 650 mg       Linked Order: Or       Frequency: q4h PRN       Dose: 650 mg       Route: oral     atorvastatin (Lipitor) tablet 10 mg       Frequency: Nightly       Dose: 10 mg       Route: oral     azithromycin (Zithromax) 500 mg in dextrose 5% 250 mL IV       Frequency: q24h       Dose: 500 mg       Route: intravenous     brimonidine (AlphaGAN) 0.2 % ophthalmic solution 1 drop       Frequency: BID       Dose: 1 drop       Route: Both Eyes     cefTRIAXone (Rocephin) 2 g in dextrose (iso) IV 50 mL       Frequency: q24h       Dose: 2 g       Route: intravenous     cholecalciferol (Vitamin D-3) tablet 25 mcg       Frequency: Daily       Dose: 25 mcg       Route: oral     dextrose 50 % injection 12.5 g       Frequency: q15 min PRN       Dose: 12.5 g       Route: intravenous     dextrose 50 % injection 25 g       Frequency: q15 min PRN       Dose: 25 g       Route: intravenous     docusate sodium (Colace) capsule 100 mg       Frequency: Nightly       Dose: 100 mg       Route: oral      ferrous sulfate tablet 325 mg       Frequency: Nightly       Dose: 1 tablet       Route: oral     glucagon (Glucagen) injection 1 mg       Frequency: q15 min PRN       Dose: 1 mg       Route: intramuscular     glucagon (Glucagen) injection 1 mg       Frequency: q15 min PRN       Dose: 1 mg       Route: intramuscular     guaiFENesin (Mucinex) 12 hr tablet 600 mg       Frequency: BID       Dose: 600 mg       Route: oral     insulin glargine (Lantus) injection 10 Units       Frequency: Nightly       Dose: 10 Units       Route: subcutaneous     insulin lispro injection 0-10 Units       Frequency: TID AC       Dose: 0-10 Units       Route: subcutaneous     ipratropium (Atrovent) 0.02 % nebulizer solution 0.5 mg       Frequency: TID       Dose: 0.5 mg       Route: nebulization     ipratropium-albuteroL (Duo-Neb) 0.5-2.5 mg/3 mL nebulizer solution 3 mL       Frequency: q2h PRN       Dose: 3 mL       Route: nebulization     levothyroxine (Synthroid, Levoxyl) tablet 100 mcg       Frequency: Daily       Dose: 100 mcg       Route: oral     melatonin tablet 5 mg       Frequency: Nightly PRN       Dose: 5 mg       Route: oral     oxygen (O2) therapy       Frequency: Continuous PRN - O2/gases       Route: inhalation     pantoprazole (Protonix) injection 40 mg       Frequency: Daily       Dose: 40 mg       Route: intravenous     torsemide (Demadex) tablet 20 mg       Frequency: Daily       Dose: 20 mg       Route: oral     traZODone (Desyrel) tablet 25 mg       Frequency: Nightly       Dose: 25 mg       Route: oral     warfarin (Coumadin) tablet 2.5 mg       Frequency: Daily       Dose: 2.5 mg       Route: oral       Order Comments: Ensure proper admin timing per warfarin policy.      Dietary Orders (From admission, onward)          Start     Ordered     05/03/25 1658   May Participate in Room Service  ( ROOM SERVICE MAY PARTICIPATE)  Once        Question:  .  Answer:  Yes    05/03/25 1657 05/03/25 1645   Adult diet  "Cardiac, Renal, Consistent Carb; CCD 75 gm/meal; 70 gm fat; 2 - 3 grams Sodium; Potassium Restricted 2 gm (50mEq)  Diet effective now        Question Answer Comment   Diet type Cardiac     Diet type Renal     Diet type Consistent Carb     Carb diet selection: CCD 75 gm/meal     Fat restriction: 70 gm fat     Sodium restriction: 2 - 3 grams Sodium     Potassium restriction: Potassium Restricted 2 gm (50mEq)         05/03/25 1655                       89 yrs@  ADMITDTTM@  Dark stools [R19.5]  Elevated protime [R79.1]  Anemia, unspecified type [D64.9]  On warfarin for atrial fibrillation (Multi) [I48.91, Z79.01]  [unfilled]  Weight: 81.6 kg (180 lb)     Vitals          Vitals:     05/02/25 2100 05/03/25 1330 05/03/25 1341 05/03/25 1345   BP: 118/85 111/72 111/72     Pulse: 82 86 88 87   Resp: 16   18 (!) 25   Temp: 36.5 °C (97.7 °F)   36.4 °C (97.5 °F)     TempSrc: Temporal   Temporal     SpO2: 97% 94% (!) 92% 96%   Weight:     81.6 kg (180 lb)     Height:     1.753 m (5' 9\")       05/03/25 1400 05/03/25 1415 05/03/25 1430 05/03/25 1445   BP:     133/58     Pulse: 88 84 79 80   Resp: (!) 22 20 19 19   Temp:           TempSrc:           SpO2: 96% 98% 95% 95%   Weight:           Height:             05/03/25 1500 05/03/25 1515 05/03/25 1530 05/03/25 1545   BP: 115/57         Pulse: 81 87 88 85   Resp: 20 (!) 24 (!) 23 (!) 25   Temp:           TempSrc:           SpO2: 97% 97% 97% 96%   Weight:           Height:             05/03/25 1600 05/03/25 1642 05/03/25 1645 05/03/25 1826   BP:   147/67 147/67 125/60   Pulse: 92 88 88 77   Resp: (!) 33 16   18   Temp:   36.4 °C (97.5 °F) 36.4 °C (97.5 °F) 36.3 °C (97.3 °F)   TempSrc:   Temporal   Temporal   SpO2: 96%   95% 94%   Weight:   81.6 kg (180 lb)       Height:   1.753 m (5' 9.02\")         05/03/25 1841 05/03/25 1926 05/03/25 2041 05/04/25 0042   BP: 126/60 118/85 118/85 122/58   Pulse: 98  Comment: sitting upright for dinner 82 82 71   Resp: 18 16 16 16   Temp: 36.3 °C (97.3 " °F) 36.5 °C (97.7 °F) 36.5 °C (97.7 °F) 35.8 °C (96.4 °F)   TempSrc: Temporal Temporal Temporal Temporal   SpO2: 94% 97% 97% 93%   Weight:           Height:             05/04/25 0056 05/04/25 0142 05/04/25 0345 05/04/25 0402   BP: (!) 111/6 109/59 124/60 124/60   Pulse: 73 78 72 72   Resp: 18 18 16 16   Temp: 36.3 °C (97.3 °F) 35.5 °C (95.9 °F) 36 °C (96.8 °F) 36 °C (96.8 °F)   TempSrc: Temporal Temporal Temporal Temporal   SpO2: 97% 96% 93% 93%   Weight:           Height:             05/04/25 0637 05/04/25 1058 05/04/25 1155   BP: 131/63 108/56     Pulse: 72 72     Resp: 16       Temp: 36.2 °C (97.2 °F) 36 °C (96.8 °F)     TempSrc: Temporal       SpO2: 95% 94% 95%   Weight:         Height:                        Chief Complaint    Weakness, Gen            Patient seen resting in bed with head of bed elevated, no s/s or c/o acute difficulties at this time.  Vital signs for last 24 hours Temp:  [35.5 °C (95.9 °F)-36.5 °C (97.7 °F)] 36 °C (96.8 °F)  Heart Rate:  [71-98] 72  Resp:  [16-33] 16  BP: (108-147)/(6-85) 108/56    No intake/output data recorded.  Patient still requiring frequent cardiac and SPO2 monitoring. Continue aggressive pulmonary hygiene and oral hygiene. Off loading as tolerated for skin integrity. Medications and labs reviewed-    Patient recently received an antibiotic (last 12 hours)         None                    Results for orders placed or performed during the hospital encounter of 05/03/25 (from the past 96 hours)   CBC and Auto Differential   Result Value Ref Range     WBC 10.0 4.4 - 11.3 x10*3/uL     nRBC 0.2 (H) 0.0 - 0.0 /100 WBCs     RBC 2.03 (L) 4.50 - 5.90 x10*6/uL     Hemoglobin 6.3 (LL) 13.5 - 17.5 g/dL     Hematocrit 20.8 (L) 41.0 - 52.0 %      (H) 80 - 100 fL     MCH 31.0 26.0 - 34.0 pg     MCHC 30.3 (L) 32.0 - 36.0 g/dL     RDW 15.3 (H) 11.5 - 14.5 %     Platelets 241 150 - 450 x10*3/uL     Neutrophils % 78.6 40.0 - 80.0 %     Immature Granulocytes %, Automated 0.7 0.0 - 0.9  %     Lymphocytes % 11.9 13.0 - 44.0 %     Monocytes % 6.2 2.0 - 10.0 %     Eosinophils % 2.2 0.0 - 6.0 %     Basophils % 0.4 0.0 - 2.0 %     Neutrophils Absolute 7.89 (H) 1.60 - 5.50 x10*3/uL     Immature Granulocytes Absolute, Automated 0.07 0.00 - 0.50 x10*3/uL     Lymphocytes Absolute 1.19 0.80 - 3.00 x10*3/uL     Monocytes Absolute 0.62 0.05 - 0.80 x10*3/uL     Eosinophils Absolute 0.22 0.00 - 0.40 x10*3/uL     Basophils Absolute 0.04 0.00 - 0.10 x10*3/uL   Comprehensive metabolic panel   Result Value Ref Range     Glucose 240 (H) 74 - 99 mg/dL     Sodium 139 136 - 145 mmol/L     Potassium 4.3 3.5 - 5.3 mmol/L     Chloride 103 98 - 107 mmol/L     Bicarbonate 28 21 - 32 mmol/L     Anion Gap 12 10 - 20 mmol/L     Urea Nitrogen 55 (H) 6 - 23 mg/dL     Creatinine 1.60 (H) 0.50 - 1.30 mg/dL     eGFR 40 (L) >60 mL/min/1.73m*2     Calcium 9.6 8.6 - 10.3 mg/dL     Albumin 3.6 3.4 - 5.0 g/dL     Alkaline Phosphatase 65 33 - 136 U/L     Total Protein 6.3 (L) 6.4 - 8.2 g/dL     AST 17 9 - 39 U/L     Bilirubin, Total 0.3 0.0 - 1.2 mg/dL     ALT 20 10 - 52 U/L   Protime-INR   Result Value Ref Range     Protime >100.0 (HH) 9.8 - 12.4 seconds     INR       Troponin I, High Sensitivity   Result Value Ref Range     Troponin I, High Sensitivity 13 0 - 20 ng/L   Type and screen   Result Value Ref Range     ABO TYPE AB       Rh TYPE POS       ANTIBODY SCREEN NEG     Magnesium   Result Value Ref Range     Magnesium 1.93 1.60 - 2.40 mg/dL   Verify ABO/Rh Group Test (VERAB)   Result Value Ref Range     ABO TYPE AB       Rh TYPE POS     Prepare RBC: 2 Units   Result Value Ref Range     PRODUCT CODE P4589T56       Unit Number [id number]       Unit ABO A       Unit RH POS       XM INTEP COMP       Dispense Status TR       Blood Expiration Date 5/23/2025 11:59:00 PM EDT       PRODUCT BLOOD TYPE 6200       UNIT VOLUME 350       PRODUCT CODE U3342U61       Unit Number [id number]       Unit ABO A       Unit RH POS       XM INTEP  COMP       Dispense Status IS       Blood Expiration Date 5/23/2025 11:59:00 PM EDT       PRODUCT BLOOD TYPE 6200       UNIT VOLUME 350     Sars-CoV-2, Influenza A/B and RSV PCR   Result Value Ref Range     Coronavirus 2019, PCR Not Detected Not Detected     Flu A Result Not Detected Not Detected     Flu B Result Not Detected Not Detected     RSV PCR Not Detected Not Detected   Legionella Antigen, Urine     Specimen: Clean Catch/Voided; Urine   Result Value Ref Range     L. pneumophila Urine Ag Negative Negative   Streptococcus pneumoniae Antigen, Urine     Specimen: Clean Catch/Voided; Urine   Result Value Ref Range     Streptococcus pneumoniae Ag, Urine Negative Negative   POCT GLUCOSE   Result Value Ref Range     POCT Glucose 279 (H) 74 - 99 mg/dL   POCT GLUCOSE   Result Value Ref Range     POCT Glucose 227 (H) 74 - 99 mg/dL   Hemoglobin and hematocrit, blood   Result Value Ref Range     Hemoglobin 8.8 (L) 13.5 - 17.5 g/dL     Hematocrit 28.0 (L) 41.0 - 52.0 %   CBC   Result Value Ref Range     WBC 8.2 4.4 - 11.3 x10*3/uL     nRBC 0.4 (H) 0.0 - 0.0 /100 WBCs     RBC 2.89 (L) 4.50 - 5.90 x10*6/uL     Hemoglobin 8.8 (L) 13.5 - 17.5 g/dL     Hematocrit 28.0 (L) 41.0 - 52.0 %     MCV 97 80 - 100 fL     MCH 30.4 26.0 - 34.0 pg     MCHC 31.4 (L) 32.0 - 36.0 g/dL     RDW 16.9 (H) 11.5 - 14.5 %     Platelets 211 150 - 450 x10*3/uL   Basic metabolic panel   Result Value Ref Range     Glucose 243 (H) 74 - 99 mg/dL     Sodium 137 136 - 145 mmol/L     Potassium 3.9 3.5 - 5.3 mmol/L     Chloride 99 98 - 107 mmol/L     Bicarbonate 29 21 - 32 mmol/L     Anion Gap 13 10 - 20 mmol/L     Urea Nitrogen 56 (H) 6 - 23 mg/dL     Creatinine 1.67 (H) 0.50 - 1.30 mg/dL     eGFR 38 (L) >60 mL/min/1.73m*2     Calcium 9.1 8.6 - 10.3 mg/dL   Protime-INR   Result Value Ref Range     Protime 16.5 (H) 9.8 - 12.4 seconds     INR 1.5 (H) 0.9 - 1.1   POCT GLUCOSE   Result Value Ref Range     POCT Glucose 227 (H) 74 - 99 mg/dL           Patient  fully evaluated 05/03, thorough record review performed of previous labs and notes from prior encounters. Plan discussed with interdisciplinary team, consults placed, appreciate input. Will continue current and repeat labs in the AM.          Discharge planning discussed with patient and care team. Therapy evaluations ordered. Patient aware and agreeable to current plan, continue plan as above.  Pulmonology consultation obtained, appreciate input.     I spent a total of 75 minutes on the date of the service which included preparing to see the patient, face-to-face patient care, completing clinical documentation, obtaining and/or reviewing separately obtained history, performing a medically appropriate examination, counseling and educating the patient/family/caregiver, ordering medications, tests, or procedures, communicating with other HCPs (not separately reported), independently interpreting results (not separately reported), communicating results to the patient/family/caregiver, and care coordination (not separately reported).         Ngozi Lea                 Revision History         Objective     Last Recorded Vitals  /53 (BP Location: Right arm, Patient Position: Sitting)   Pulse 94   Temp 36.5 °C (97.7 °F) (Temporal)   Resp 18   Wt 78.5 kg (173 lb 1 oz)   SpO2 95%   Intake/Output last 3 Shifts:    Intake/Output Summary (Last 24 hours) at 5/8/2025 1510  Last data filed at 5/8/2025 1003  Gross per 24 hour   Intake 50 ml   Output 770 ml   Net -720 ml       Admission Weight  Weight: 81.6 kg (180 lb) (05/03/25 1341)    Daily Weight  05/06/25 : 78.5 kg (173 lb 1 oz)    Image Results  Esophagogastroduodenoscopy (EGD)  Table formatting from the original result was not included.  Impression  Irregular Z-line  Medium type I hiatal hernia with Maximino lesions present  Mild erythematous mucosa in the antrum, consistent with gastritis;   performed cold forceps biopsy to rule out H. pylori  The duodenum  appeared normal.    Findings  Irregular Z-line  Medium sliding hiatal hernia (type I hiatal hernia) with Maximino lesions   present, confirmed by retroflexion. Hill grade III hiatal hernia  Mild erythematous mucosa in the antrum, consistent with gastritis;   performed cold forceps biopsy to rule out H. pylori  The duodenum appeared normal.    Recommendation  Await pathology results   Continue PPI therapy  Proceed with colonoscopy       Indication  Dark stools, Anemia, unspecified type, On warfarin for atrial fibrillation   (Multi)    Post-Op Diagnosis  None    Staff  Staff Role   Cleve Greene MD Proceduralist     Medications  See Anesthesia Record.     Preprocedure  A history and physical has been performed, and patient medication   allergies have been reviewed. The patient's tolerance of previous   anesthesia has been reviewed. The risks and benefits of the procedure and   the sedation options and risks were discussed with the patient. All   questions were answered and informed consent obtained.    Details of the Procedure  The patient underwent monitored anesthesia care, which was administered by   an anesthesia professional. The patient's blood pressure, ECG, ETCO2,   heart rate, level of consciousness, oxygen and respirations were monitored   throughout the procedure. The scope was introduced through the mouth and   advanced to the second part of the duodenum. Retroflexion was performed in   the cardia. Prior to the procedure, the patient's H. Pylori status was   unknown. The patient experienced no blood loss. The procedure was not   difficult. The patient tolerated the procedure well. There were no   apparent adverse events.     Events  Procedure Events   Event Event Time   ENDO SCOPE IN TIME 5/7/2025 12:24 PM   ENDO SCOPE OUT TIME 5/7/2025 12:28 PM   ENDO SCOPE IN TIME 5/7/2025 12:33 PM   ENDO CECUM REACHED 5/7/2025 12:36 PM     Specimens  ID Type Source Tests Collected by Time   1 : COLD BX Tissue STOMACH  ANTRUM BIOPSY SURGICAL PATHOLOGY EXAM Cleve Greene MD 5/7/2025 1227     Procedure Location  [facility] 3  [address]  [facility] [id number] [phone]    Referring Provider  Marita Moreno, APRN-CNP    Procedure Provider  Cleve Greene MD  Colonoscopy Diagnostic  Table formatting from the original result was not included.  Impression  Examination limited by quality of prep.  6 polyps in the ascending colon, transverse colon and descending colon.   Not removed due to indication of examination and overall health  No etiology for anemia identified. Based on findings of EGD, suspect   anemia may be due to intermittent delilah ulcers secondary to hiatal   hernia.   Diverticulosis in the sigmoid colon    Findings  Six polyps measuring smaller than 5 mm in the ascending colon, transverse   colon and descending colon  Few diverticula in the sigmoid colon       Recommendation  Follow up with primary gastroenterologist   PPI therapy definitely  Resume prior diet  Watch for complications         Indication  Dark stools, Anemia, unspecified type, On warfarin for atrial fibrillation   (Multi)    Post-Op Diagnosis  None    Staff  Staff Role   Cleve Greene MD Proceduralist     Medications  See Anesthesia Record.     Preprocedure  A history and physical has been performed, and patient medication   allergies have been reviewed. The patient's tolerance of previous   anesthesia has been reviewed. The risks and benefits of the procedure and   the sedation options and risks were discussed with the patient. All   questions were answered and informed consent obtained.    Details of the Procedure  The patient underwent monitored anesthesia care, which was administered by   an anesthesia professional. The patient's blood pressure, ECG, ETCO2,   heart rate, level of consciousness, oxygen and respirations were monitored   throughout the procedure. A digital rectal exam was performed. The scope   was  introduced through the anus and advanced to the cecum. Retroflexion   was performed in the rectum. The quality of bowel preparation was   evaluated using the [place] Bowel Preparation Scale with scores of: right   colon = 2, transverse colon = 2, left colon = 1. The total BBPS score was   5. Bowel prep was not adequate. The patient experienced no blood loss. The   procedure was not difficult. The patient tolerated the procedure well.   There were no apparent adverse events.     Events  Procedure Events   Event Event Time   ENDO SCOPE IN TIME 5/7/2025 12:24 PM   ENDO SCOPE OUT TIME 5/7/2025 12:28 PM   ENDO SCOPE IN TIME 5/7/2025 12:33 PM   ENDO CECUM REACHED 5/7/2025 12:36 PM   ENDO SCOPE OUT TIME 5/7/2025 12:46 PM     Specimens  ID Type Source Tests Collected by Time   1 : COLD BX Tissue STOMACH ANTRUM BIOPSY SURGICAL PATHOLOGY EXAM Cleve Greene MD 5/7/2025 1227     Procedure Location  [facility] 3  [address] [facility] [address]  [phone]    Referring Provider  Marita Moreno, APRN-CNP    Procedure Provider  MD Marita Pollard      Physical Exam  Vitals and nursing note reviewed.       General appearance: Not in pain or distress, in no respiratory distress. Weakness noted    HEENT: Atraumatic/normocephalic, EOMI, SABRA, pharynx clear, moist mucosa, redness of the uvula appreciated,   Neck: Supple, no jugular venous distension, lymphadenopathy, thyromegaly or carotid bruits  Chest: Rhonchi  Cardiovascular: Normal S1, S2, regular rate and rhythm, no murmur, rub or gallop  Abdomen: Normal sounds present, soft, lax with no tenderness, no hepatosplenomegaly, and no masses  Extremities: No edema. Pulses are equally present.   Skin: intact, no rashes   Neurologic: Alert and oriented x 3, No focal deficit        Assessment & Plan  Anemia, unspecified type    Supratherapeutic INR    Productive cough    Shortness of breath    Generalized  weakness    Patient evaluated May 5. Patient anemic at 8.6. Overall, anemia is improving. 2 units of PRBCs ordered. PT down trending from >100, to 16.5 today. GI consult placed, would appreciate input. Stool occult blood pending. Hyperglycemic at 216. Continue to monitor H/H.     Discharge planning discussed with patient and care team. Therapy evaluations ordered. Patient aware and agreeable to current plan, continue plan as above.     I spent a total of 75 minutes on the date of the service which included preparing to see the patient, face-to-face patient care, completing clinical documentation, obtaining and/or reviewing separately obtained history, performing a medically appropriate examination, counseling and educating the patient/family/caregiver, ordering medications, tests, or procedures, communicating with other HCPs (not separately reported), independently interpreting results (not separately reported), communicating results to the patient/family/caregiver, and care coordination (not separately reported).     Patient fully evaluated  05/06  for    Problem List Items Addressed This Visit          Hematology and Neoplasia    * (Principal) Anemia, unspecified type - Primary    Relevant Orders    Esophagogastroduodenoscopy (EGD) (Completed)    Colonoscopy Diagnostic (Completed)    Surgical Pathology Exam       Pulmonary and Pneumonias    RESOLVED: Pneumonia    Relevant Orders    Surgical Pathology Exam     Other Visit Diagnoses         On warfarin for atrial fibrillation (Multi)        Relevant Orders    Esophagogastroduodenoscopy (EGD) (Completed)    Colonoscopy Diagnostic (Completed)    Surgical Pathology Exam      Dark stools        Relevant Orders    Esophagogastroduodenoscopy (EGD) (Completed)    Colonoscopy Diagnostic (Completed)    Surgical Pathology Exam      Elevated protime        Relevant Orders    Surgical Pathology Exam          Patient seen resting in bed with head of bed elevated, no s/s or c/o  acute difficulties at this time.  Vital signs for last 24 hours Temp:  [36.5 °C (97.7 °F)-36.6 °C (97.9 °F)] 36.5 °C (97.7 °F)  Heart Rate:  [81-95] 94  Resp:  [18-20] 18  BP: (107-121)/(53-60) 108/53    I/O this shift:  In: -   Out: 200 [Urine:200]  Patient still requiring frequent cardiac and SPO2 monitoring. Continue aggressive pulmonary hygiene and oral hygiene. Off loading as tolerated for skin integrity. Medications and labs reviewed-   Results for orders placed or performed during the hospital encounter of 05/03/25 (from the past 24 hours)   POCT GLUCOSE   Result Value Ref Range    POCT Glucose 151 (H) 74 - 99 mg/dL   POCT GLUCOSE   Result Value Ref Range    POCT Glucose 295 (H) 74 - 99 mg/dL   Comprehensive Metabolic Panel   Result Value Ref Range    Glucose 179 (H) 74 - 99 mg/dL    Sodium 134 (L) 136 - 145 mmol/L    Potassium 3.5 3.5 - 5.3 mmol/L    Chloride 96 (L) 98 - 107 mmol/L    Bicarbonate 30 21 - 32 mmol/L    Anion Gap 12 10 - 20 mmol/L    Urea Nitrogen 26 (H) 6 - 23 mg/dL    Creatinine 1.60 (H) 0.50 - 1.30 mg/dL    eGFR 40 (L) >60 mL/min/1.73m*2    Calcium 8.1 (L) 8.6 - 10.3 mg/dL    Albumin 3.3 (L) 3.4 - 5.0 g/dL    Alkaline Phosphatase 65 33 - 136 U/L    Total Protein 5.8 (L) 6.4 - 8.2 g/dL    AST 20 9 - 39 U/L    Bilirubin, Total 0.4 0.0 - 1.2 mg/dL    ALT 16 10 - 52 U/L   CBC and Auto Differential   Result Value Ref Range    WBC 10.6 4.4 - 11.3 x10*3/uL    nRBC 0.0 0.0 - 0.0 /100 WBCs    RBC 2.60 (L) 4.50 - 5.90 x10*6/uL    Hemoglobin 8.0 (L) 13.5 - 17.5 g/dL    Hematocrit 25.5 (L) 41.0 - 52.0 %    MCV 98 80 - 100 fL    MCH 30.8 26.0 - 34.0 pg    MCHC 31.4 (L) 32.0 - 36.0 g/dL    RDW 16.6 (H) 11.5 - 14.5 %    Platelets 220 150 - 450 x10*3/uL    Neutrophils % 80.5 40.0 - 80.0 %    Immature Granulocytes %, Automated 0.6 0.0 - 0.9 %    Lymphocytes % 6.8 13.0 - 44.0 %    Monocytes % 9.3 2.0 - 10.0 %    Eosinophils % 2.3 0.0 - 6.0 %    Basophils % 0.5 0.0 - 2.0 %    Neutrophils Absolute 8.53 (H) 1.60  - 5.50 x10*3/uL    Immature Granulocytes Absolute, Automated 0.06 0.00 - 0.50 x10*3/uL    Lymphocytes Absolute 0.72 (L) 0.80 - 3.00 x10*3/uL    Monocytes Absolute 0.99 (H) 0.05 - 0.80 x10*3/uL    Eosinophils Absolute 0.24 0.00 - 0.40 x10*3/uL    Basophils Absolute 0.05 0.00 - 0.10 x10*3/uL   POCT GLUCOSE   Result Value Ref Range    POCT Glucose 172 (H) 74 - 99 mg/dL   POCT GLUCOSE   Result Value Ref Range    POCT Glucose 307 (H) 74 - 99 mg/dL      Patient recently received an antibiotic (last 12 hours)       None           Plan discussed with interdisciplinary team,  seen  by GI with plan -   Suspect this was exacerbated by his supratherapeutic INR  -Presented with increased fatigue, hemoglobin 6.3  -Continue holding Coumadin, continue IV PPI, monitor Hgb and transfuse as necessary  Appreciate input, will continue current, continue IV antibiotics  Azithromycin, Rocephin, IV protonix, INR @ 1.5, hemoglobin @ 9.3 today. Patient tolerating diet, abdomen soft, nontender, bowel sounds x4, unaware of last BM per patient, on oral iron, will order bowel regimen scheduled and PRN. Up in chair 3 x day, ambulate as tolerated/indicated. Will continue to monitor overnight, monitor for acute bleeding, history of cardiac issues, transfuse to keep hemoglobin >8. Will continue current and repeat labs in the AM.     Discharge planning discussed with patient and care team. Therapy evaluations ordered. Anticipate return to facility SNF at discharge. Patient aware and agreeable to current plan, continue plan as above.    Patient fully evaluated on May 8.  Clinically patient is improved continue to monitor H&H which slightly dropped today.  Recheck labs in a.m. with possible discharge tomorrow    I spent a total of 60 minutes on the date of the service which included preparing to see the patient, face-to-face patient care, completing clinical documentation, obtaining and/or reviewing separately obtained history, performing a medically  appropriate examination, counseling and educating the patient/family/caregiver, ordering medications, tests, or procedures, communicating with other HCPs (not separately reported), independently interpreting results (not separately reported), communicating results to the patient/family/caregiver, and care coordination (not separately reported).

## 2025-05-09 ENCOUNTER — APPOINTMENT (OUTPATIENT)
Dept: GASTROENTEROLOGY | Facility: CLINIC | Age: OVER 89
End: 2025-05-09
Payer: MEDICARE

## 2025-05-09 ENCOUNTER — PHARMACY VISIT (OUTPATIENT)
Dept: PHARMACY | Facility: CLINIC | Age: OVER 89
End: 2025-05-09
Payer: COMMERCIAL

## 2025-05-09 VITALS
HEART RATE: 83 BPM | WEIGHT: 173.06 LBS | SYSTOLIC BLOOD PRESSURE: 106 MMHG | DIASTOLIC BLOOD PRESSURE: 55 MMHG | HEIGHT: 69 IN | BODY MASS INDEX: 25.63 KG/M2 | OXYGEN SATURATION: 95 % | RESPIRATION RATE: 18 BRPM | TEMPERATURE: 98.6 F

## 2025-05-09 LAB
ALBUMIN SERPL BCP-MCNC: 3.2 G/DL (ref 3.4–5)
ALP SERPL-CCNC: 62 U/L (ref 33–136)
ALT SERPL W P-5'-P-CCNC: 14 U/L (ref 10–52)
ANION GAP SERPL CALC-SCNC: 13 MMOL/L (ref 10–20)
AST SERPL W P-5'-P-CCNC: 18 U/L (ref 9–39)
ATRIAL RATE: 87 BPM
BASOPHILS # BLD AUTO: 0.02 X10*3/UL (ref 0–0.1)
BASOPHILS NFR BLD AUTO: 0.3 %
BILIRUB SERPL-MCNC: 0.4 MG/DL (ref 0–1.2)
BUN SERPL-MCNC: 24 MG/DL (ref 6–23)
CALCIUM SERPL-MCNC: 8.1 MG/DL (ref 8.6–10.3)
CHLORIDE SERPL-SCNC: 98 MMOL/L (ref 98–107)
CO2 SERPL-SCNC: 29 MMOL/L (ref 21–32)
CREAT SERPL-MCNC: 1.67 MG/DL (ref 0.5–1.3)
EGFRCR SERPLBLD CKD-EPI 2021: 38 ML/MIN/1.73M*2
EOSINOPHIL # BLD AUTO: 0.18 X10*3/UL (ref 0–0.4)
EOSINOPHIL NFR BLD AUTO: 2.8 %
ERYTHROCYTE [DISTWIDTH] IN BLOOD BY AUTOMATED COUNT: 16.2 % (ref 11.5–14.5)
GLUCOSE BLD MANUAL STRIP-MCNC: 131 MG/DL (ref 74–99)
GLUCOSE BLD MANUAL STRIP-MCNC: 264 MG/DL (ref 74–99)
GLUCOSE SERPL-MCNC: 131 MG/DL (ref 74–99)
HCT VFR BLD AUTO: 26.1 % (ref 41–52)
HGB BLD-MCNC: 8 G/DL (ref 13.5–17.5)
IMM GRANULOCYTES # BLD AUTO: 0.04 X10*3/UL (ref 0–0.5)
IMM GRANULOCYTES NFR BLD AUTO: 0.6 % (ref 0–0.9)
LYMPHOCYTES # BLD AUTO: 0.8 X10*3/UL (ref 0.8–3)
LYMPHOCYTES NFR BLD AUTO: 12.4 %
MCH RBC QN AUTO: 30 PG (ref 26–34)
MCHC RBC AUTO-ENTMCNC: 30.7 G/DL (ref 32–36)
MCV RBC AUTO: 98 FL (ref 80–100)
MONOCYTES # BLD AUTO: 0.77 X10*3/UL (ref 0.05–0.8)
MONOCYTES NFR BLD AUTO: 11.9 %
NEUTROPHILS # BLD AUTO: 4.65 X10*3/UL (ref 1.6–5.5)
NEUTROPHILS NFR BLD AUTO: 72 %
NRBC BLD-RTO: 0 /100 WBCS (ref 0–0)
P AXIS: 212 DEGREES
PLATELET # BLD AUTO: 212 X10*3/UL (ref 150–450)
POTASSIUM SERPL-SCNC: 3.4 MMOL/L (ref 3.5–5.3)
PR INTERVAL: 260 MS
PROT SERPL-MCNC: 5.8 G/DL (ref 6.4–8.2)
Q ONSET: 249 MS
QRS COUNT: 14 BEATS
QRS DURATION: 144 MS
QT INTERVAL: 408 MS
QTC CALCULATION(BAZETT): 486 MS
QTC FREDERICIA: 458 MS
R AXIS: 75 DEGREES
RBC # BLD AUTO: 2.67 X10*6/UL (ref 4.5–5.9)
SODIUM SERPL-SCNC: 137 MMOL/L (ref 136–145)
T AXIS: -45 DEGREES
T OFFSET: 453 MS
VENTRICULAR RATE: 85 BPM
WBC # BLD AUTO: 6.5 X10*3/UL (ref 4.4–11.3)

## 2025-05-09 PROCEDURE — 2500000004 HC RX 250 GENERAL PHARMACY W/ HCPCS (ALT 636 FOR OP/ED): Performed by: INTERNAL MEDICINE

## 2025-05-09 PROCEDURE — 97116 GAIT TRAINING THERAPY: CPT | Mod: GP,CQ

## 2025-05-09 PROCEDURE — 82947 ASSAY GLUCOSE BLOOD QUANT: CPT

## 2025-05-09 PROCEDURE — 94640 AIRWAY INHALATION TREATMENT: CPT

## 2025-05-09 PROCEDURE — 84075 ASSAY ALKALINE PHOSPHATASE: CPT | Performed by: INTERNAL MEDICINE

## 2025-05-09 PROCEDURE — 2500000004 HC RX 250 GENERAL PHARMACY W/ HCPCS (ALT 636 FOR OP/ED): Mod: JZ | Performed by: PHYSICIAN ASSISTANT

## 2025-05-09 PROCEDURE — 2500000002 HC RX 250 W HCPCS SELF ADMINISTERED DRUGS (ALT 637 FOR MEDICARE OP, ALT 636 FOR OP/ED): Performed by: INTERNAL MEDICINE

## 2025-05-09 PROCEDURE — 36415 COLL VENOUS BLD VENIPUNCTURE: CPT | Performed by: INTERNAL MEDICINE

## 2025-05-09 PROCEDURE — RXMED WILLOW AMBULATORY MEDICATION CHARGE

## 2025-05-09 PROCEDURE — 2500000001 HC RX 250 WO HCPCS SELF ADMINISTERED DRUGS (ALT 637 FOR MEDICARE OP): Performed by: PHYSICIAN ASSISTANT

## 2025-05-09 PROCEDURE — 85025 COMPLETE CBC W/AUTO DIFF WBC: CPT | Performed by: INTERNAL MEDICINE

## 2025-05-09 PROCEDURE — 2500000002 HC RX 250 W HCPCS SELF ADMINISTERED DRUGS (ALT 637 FOR MEDICARE OP, ALT 636 FOR OP/ED): Performed by: PHYSICIAN ASSISTANT

## 2025-05-09 RX ORDER — GUAIFENESIN 600 MG/1
600 TABLET, EXTENDED RELEASE ORAL 2 TIMES DAILY
Qty: 20 TABLET | Refills: 0 | Status: SHIPPED | OUTPATIENT
Start: 2025-05-09 | End: 2025-05-19

## 2025-05-09 RX ORDER — AMOXICILLIN 250 MG
1 CAPSULE ORAL NIGHTLY
Qty: 30 TABLET | Refills: 0 | Status: SHIPPED | OUTPATIENT
Start: 2025-05-09 | End: 2025-06-08

## 2025-05-09 RX ORDER — PANTOPRAZOLE SODIUM 40 MG/1
40 TABLET, DELAYED RELEASE ORAL 2 TIMES DAILY
Qty: 60 TABLET | Refills: 0 | Status: SHIPPED | OUTPATIENT
Start: 2025-05-09 | End: 2025-06-08

## 2025-05-09 RX ORDER — WARFARIN 2 MG/1
2 TABLET ORAL DAILY
Status: DISCONTINUED | OUTPATIENT
Start: 2025-05-09 | End: 2025-05-09 | Stop reason: HOSPADM

## 2025-05-09 RX ORDER — ACETAMINOPHEN 325 MG/1
650 TABLET ORAL EVERY 4 HOURS PRN
Qty: 30 TABLET | Refills: 0 | Status: SHIPPED | OUTPATIENT
Start: 2025-05-09

## 2025-05-09 RX ORDER — DOXYCYCLINE 100 MG/1
100 CAPSULE ORAL 2 TIMES DAILY
Qty: 20 CAPSULE | Refills: 0 | Status: SHIPPED | OUTPATIENT
Start: 2025-05-09 | End: 2025-05-19

## 2025-05-09 RX ORDER — WARFARIN 2 MG/1
TABLET ORAL
Qty: 30 TABLET | Refills: 0 | Status: SHIPPED | OUTPATIENT
Start: 2025-05-09 | End: 2025-05-09 | Stop reason: HOSPADM

## 2025-05-09 RX ADMIN — POLYETHYLENE GLYCOL 3350 17 G: 17 POWDER, FOR SOLUTION ORAL at 09:15

## 2025-05-09 RX ADMIN — TORSEMIDE 20 MG: 20 TABLET ORAL at 09:10

## 2025-05-09 RX ADMIN — BRIMONIDINE TARTRATE 1 DROP: 2 SOLUTION/ DROPS OPHTHALMIC at 09:11

## 2025-05-09 RX ADMIN — BISACODYL 5 MG: 5 TABLET, COATED ORAL at 09:11

## 2025-05-09 RX ADMIN — Medication 25 MCG: at 09:10

## 2025-05-09 RX ADMIN — PANTOPRAZOLE SODIUM 40 MG: 40 INJECTION, POWDER, FOR SOLUTION INTRAVENOUS at 09:10

## 2025-05-09 RX ADMIN — GUAIFENESIN 600 MG: 600 TABLET, EXTENDED RELEASE ORAL at 09:11

## 2025-05-09 RX ADMIN — IPRATROPIUM BROMIDE 0.5 MG: 0.5 SOLUTION RESPIRATORY (INHALATION) at 06:26

## 2025-05-09 RX ADMIN — LEVOTHYROXINE SODIUM 100 MCG: 0.1 TABLET ORAL at 06:10

## 2025-05-09 ASSESSMENT — COGNITIVE AND FUNCTIONAL STATUS - GENERAL
MOVING FROM LYING ON BACK TO SITTING ON SIDE OF FLAT BED WITH BEDRAILS: A LITTLE
MOBILITY SCORE: 14
MOVING FROM LYING ON BACK TO SITTING ON SIDE OF FLAT BED WITH BEDRAILS: A LITTLE
WALKING IN HOSPITAL ROOM: A LOT
STANDING UP FROM CHAIR USING ARMS: A LOT
MOVING TO AND FROM BED TO CHAIR: A LITTLE
WALKING IN HOSPITAL ROOM: A LITTLE
CLIMB 3 TO 5 STEPS WITH RAILING: A LOT
DRESSING REGULAR UPPER BODY CLOTHING: A LITTLE
CLIMB 3 TO 5 STEPS WITH RAILING: A LOT
MOVING TO AND FROM BED TO CHAIR: A LOT
HELP NEEDED FOR BATHING: A LITTLE
TOILETING: A LOT
TURNING FROM BACK TO SIDE WHILE IN FLAT BAD: A LITTLE
DAILY ACTIVITIY SCORE: 18
PERSONAL GROOMING: A LITTLE
TURNING FROM BACK TO SIDE WHILE IN FLAT BAD: A LITTLE
MOBILITY SCORE: 17
STANDING UP FROM CHAIR USING ARMS: A LITTLE
DRESSING REGULAR LOWER BODY CLOTHING: A LITTLE

## 2025-05-09 ASSESSMENT — PAIN - FUNCTIONAL ASSESSMENT: PAIN_FUNCTIONAL_ASSESSMENT: 0-10

## 2025-05-09 ASSESSMENT — PAIN SCALES - GENERAL
PAINLEVEL_OUTOF10: 0 - NO PAIN
PAINLEVEL_OUTOF10: 0 - NO PAIN

## 2025-05-09 NOTE — CARE PLAN
Problem: Pain - Adult  Goal: Verbalizes/displays adequate comfort level or baseline comfort level  Outcome: Progressing     Problem: Safety - Adult  Goal: Free from fall injury  Outcome: Progressing     Problem: Chronic Conditions and Co-morbidities  Goal: Patient's chronic conditions and co-morbidity symptoms are monitored and maintained or improved  Outcome: Progressing     Problem: Nutrition  Goal: Nutrient intake appropriate for maintaining nutritional needs  Outcome: Progressing     Problem: Diabetes  Goal: Achieve decreasing blood glucose levels by end of shift  Outcome: Progressing     Problem: Diabetes  Goal: Increase stability of blood glucose readings by end of shift  Outcome: Progressing     Problem: Discharge Planning  Goal: Discharge to home or other facility with appropriate resources  Outcome: Progressing

## 2025-05-09 NOTE — DISCHARGE SUMMARY
Discharge Diagnosis  Anemia, unspecified type     Issues Requiring Follow-Up  Patient fully evaluated  05/09  for   Assessment & Plan  Anemia, unspecified type    Supratherapeutic INR    Productive cough    Shortness of breath    Generalized weakness    No acute events overnight, stable at time of discharge. Patient with significant clinical improvement noted, patient medically cleared for discharge today. Patient seen resting in bed with head of bed elevated, no s/s or c/o acute difficulties at this time. Vital signs for last 24 hours:  Temp:  [35.9 °C (96.6 °F)-37 °C (98.6 °F)] 37 °C (98.6 °F)  Heart Rate:  [77-94] 83  Resp:  [18] 18  BP: (106-117)/(52-59) 106/55 Medications and labs reviewed-   Results for orders placed or performed during the hospital encounter of 05/03/25 (from the past 24 hours)   POCT GLUCOSE   Result Value Ref Range    POCT Glucose 108 (H) 74 - 99 mg/dL   POCT GLUCOSE   Result Value Ref Range    POCT Glucose 131 (H) 74 - 99 mg/dL   Comprehensive Metabolic Panel   Result Value Ref Range    Glucose 131 (H) 74 - 99 mg/dL    Sodium 137 136 - 145 mmol/L    Potassium 3.4 (L) 3.5 - 5.3 mmol/L    Chloride 98 98 - 107 mmol/L    Bicarbonate 29 21 - 32 mmol/L    Anion Gap 13 10 - 20 mmol/L    Urea Nitrogen 24 (H) 6 - 23 mg/dL    Creatinine 1.67 (H) 0.50 - 1.30 mg/dL    eGFR 38 (L) >60 mL/min/1.73m*2    Calcium 8.1 (L) 8.6 - 10.3 mg/dL    Albumin 3.2 (L) 3.4 - 5.0 g/dL    Alkaline Phosphatase 62 33 - 136 U/L    Total Protein 5.8 (L) 6.4 - 8.2 g/dL    AST 18 9 - 39 U/L    Bilirubin, Total 0.4 0.0 - 1.2 mg/dL    ALT 14 10 - 52 U/L   CBC and Auto Differential   Result Value Ref Range    WBC 6.5 4.4 - 11.3 x10*3/uL    nRBC 0.0 0.0 - 0.0 /100 WBCs    RBC 2.67 (L) 4.50 - 5.90 x10*6/uL    Hemoglobin 8.0 (L) 13.5 - 17.5 g/dL    Hematocrit 26.1 (L) 41.0 - 52.0 %    MCV 98 80 - 100 fL    MCH 30.0 26.0 - 34.0 pg    MCHC 30.7 (L) 32.0 - 36.0 g/dL    RDW 16.2 (H) 11.5 - 14.5 %    Platelets 212 150 - 450 x10*3/uL     Neutrophils % 72.0 40.0 - 80.0 %    Immature Granulocytes %, Automated 0.6 0.0 - 0.9 %    Lymphocytes % 12.4 13.0 - 44.0 %    Monocytes % 11.9 2.0 - 10.0 %    Eosinophils % 2.8 0.0 - 6.0 %    Basophils % 0.3 0.0 - 2.0 %    Neutrophils Absolute 4.65 1.60 - 5.50 x10*3/uL    Immature Granulocytes Absolute, Automated 0.04 0.00 - 0.50 x10*3/uL    Lymphocytes Absolute 0.80 0.80 - 3.00 x10*3/uL    Monocytes Absolute 0.77 0.05 - 0.80 x10*3/uL    Eosinophils Absolute 0.18 0.00 - 0.40 x10*3/uL    Basophils Absolute 0.02 0.00 - 0.10 x10*3/uL   POCT GLUCOSE   Result Value Ref Range    POCT Glucose 264 (H) 74 - 99 mg/dL      Patient recently received an antibiotic (last 12 hours)       None           No results found for the last 90 days.       Continue aggressive pulmonary hygiene and oral hygiene. Off loading as tolerated for skin integrity. No new complaints per patient, stable at time of exam.  Plan discussed with interdisciplinary team, no acute events overnight, patient denies chest pain, worsening SOB at this time. Ok to discharge, will continue current and repeat labs in the AM if patient still hospitalized. Patient aware and agreeable to current plan, continue plan as above.     I spent 60 minutes on the date of the service which included preparing to see the patient, face-to-face patient care, completing clinical documentation, obtaining and/or reviewing separately obtained history, performing a medically appropriate examination, counseling and educating the patient/family/caregiver, ordering medications, tests, or procedures, communicating with other HCPs (not separately reported), independently interpreting results (not separately reported), communicating results to the patient/family/caregiver, and care coordination (not separately reported    Discharge Meds     Medication List      START taking these medications     acetaminophen 325 mg tablet; Commonly known as: Tylenol; Take 2 tablets   (650 mg) by mouth every 4  hours if needed for mild pain (1 - 3).   doxycycline 100 mg capsule; Commonly known as: Vibramycin; Take 1   capsule (100 mg) by mouth 2 times a day for 10 days. Take with at least 8   ounces (large glass) of water, do not lie down for 30 minutes after   Mucus Relief  mg 12 hr tablet; Generic drug: guaiFENesin; Take 1   tablet (600 mg) by mouth 2 times a day for 10 days. Do not crush, chew, or   split.   polyethylene glycol 17 gram packet; Commonly known as: Glycolax,   Miralax; Take 17 g by mouth once daily.   Senna Plus 8.6-50 mg tablet; Generic drug: sennosides-docusate sodium;   Take 1 tablet by mouth once daily at bedtime.     CHANGE how you take these medications     Blood Glucose Test; Generic drug: blood sugar diagnostic; 1 strip by   abdominal subcutaneous route 2 times a day. twice a day.; What changed:   how to take this, additional instructions   insulin degludec 100 unit/mL (3 mL) pen; Commonly known as: Tresiba   FlexTouch U-100; Inject 8 Units under the skin once daily in the morning.   * levothyroxine 100 mcg tablet; Commonly known as: Synthroid, Levoxyl;   Take 1 tablet (100 mcg) by mouth once daily.; What changed: when to take   this   * levothyroxine 100 mcg tablet; Commonly known as: Synthroid, Levoxyl;   TAKE 1 TABLET BY MOUTH ONCE  DAILY; What changed: Another medication with   the same name was changed. Make sure you understand how and when to take   each.   pantoprazole 40 mg EC tablet; Commonly known as: ProtoNix; Take 1 tablet   (40 mg) by mouth 2 times a day. Do not crush, chew, or split.; What   changed: when to take this, additional instructions   torsemide 20 mg tablet; Commonly known as: Demadex; What changed:   Another medication with the same name was removed. Continue taking this   medication, and follow the directions you see here.   warfarin 2.5 mg tablet; Commonly known as: Coumadin; Take 1 tablet (2.5   mg) by mouth once daily at bedtime.  * This list has 2 medication(s)  "that are the same as other medications   prescribed for you. Read the directions carefully, and ask your doctor or   other care provider to review them with you.     CONTINUE taking these medications     Afrin (oxymetazoline) 0.05 % nasal spray; Generic drug: oxymetazoline   albuterol 90 mcg/actuation inhaler; Inhale 2 puffs every 6 hours if   needed for wheezing or shortness of breath.   atorvastatin 10 mg tablet; Commonly known as: Lipitor; Take 1 tablet (10   mg) by mouth once daily at bedtime.   brimonidine 0.2 % ophthalmic solution; Commonly known as: AlphaGAN   Calcium 500 + D 500 mg-10 mcg (400 unit) tablet; Generic drug: calcium   carbonate-vitamin D3   cholecalciferol 25 mcg (1,000 units) tablet; Commonly known as: Vitamin   D-3   docusate sodium 100 mg capsule; Commonly known as: Colace   ferrous sulfate 325 mg (65 mg elemental) tablet   Coleen-Lanta 200-200-20 mg/5 mL oral suspension; Generic drug: alum-mag   hydroxide-simeth   glipiZIDE 5 mg tablet; Commonly known as: Glucotrol   melatonin 5 mg tablet   NovoLOG U-100 Insulin aspart 100 unit/mL injection; Generic drug:   insulin aspart   pen needle, diabetic 31 gauge x 3/16\" needle; Commonly known as: BD   Ultra-Fine Mini Pen Needle; TEST BLOOD SUGAR TWICE A DAY   traZODone 50 mg tablet; Commonly known as: Desyrel   Tylenol PM Extra Strength  mg per tablet; Generic drug:   diphenhydrAMINE-acetaminophen       Test Results Pending At Discharge  Pending Labs       Order Current Status    Surgical Pathology Exam In process            Hospital Course           Sameer Weaver MD   Physician  Internal Medicine     Progress Notes      Signed     Date of Service: 5/8/2025  3:10 PM     Signed       Expand All Collapse All    Subjective  Saul Asif is a 89 y.o. male on day 1 of admission presenting with Anemia, unspecified type.           Sameer Weaver MD   Physician  Internal Medicine     H&P      Signed     Date of Service: 5/3/2025  5:40 PM      Signed "        Expand All Collapse All    History Of Present Illness  Saul Asif is a 89 y.o. male with past medical history significant for atrial fibrillation on Coumadin, CKD, HLD, hypothyroidism, prostate cancer, aortic valve stenosis, and type 2 diabetes mellitus who presents to the ED from facility for evaluation of low hemoglobin level.  Hemoglobin level 6.3 this morning on lab work at facility.  Patient reports extreme fatigue over the past 2 days.  Also notes dark stools however denies noticing any blood in his stools.  Denies any blood in his urine or coughing up any blood.  Has had blood transfusions in the past.  Reports some generalized weakness over the past couple days and generally not feeling very well.  Patient's family also notes shortness of breath and productive cough over the past week, granddaughter stating his breathing sounded horrible last week and when she was visiting him.  States the facility did a chest x-ray at that time which was unremarkable.  Patient still reports congestion, coughing up yellow phlegm and intermittent shortness of breath.  Denies any fevers or chills.  Denies headaches, dizziness, chest pains, abdominal pain or nausea, appetite changes, or urinary changes.  Says he had COVID at the beginning of the year which was very hard on him.     ED course: On arrival to the ED, patient afebrile and hemodynamically stable with SpO2 92% on room air.  Glucose 240, electrolytes WNL, BUN 55/creatinine 1.6, LFTs WNL, troponin 13.  PT greater than 100, unable to calculate INR level.  WBC 10, hemoglobin 6.3/hematocrit 20.8, platelets 241.  Chest x-ray shows new scattered bilateral lower lobe predominant nodular infiltrates could be infectious.     EKG (interpreted by ED physician): Sinus rhythm with prolonged NM interval, rate of 85, RBBB, no ST segment depression or elevation consistent with ischemia or infarction.           Past Medical History  Medical History     Surgical  "History  Surgical History     Social History  He reports that he has quit smoking. His smoking use included cigarettes. He has never used smokeless tobacco. No history on file for alcohol use and drug use.     Family History  Family History     Allergies  Patient has no known allergies.     Review of Systems   All other systems reviewed and are negative.     10 point review system negative except as noted above in HPI     Physical Exam  Vitals and nursing note reviewed.   Constitutional:       General: He is not in acute distress.     Appearance: Normal appearance. He is not toxic-appearing.   HENT:      Head: Normocephalic and atraumatic.      Mouth/Throat:      Pharynx: Oropharynx is clear.   Eyes:      Conjunctiva/sclera: Conjunctivae normal.   Cardiovascular:      Rate and Rhythm: Normal rate and regular rhythm.   Pulmonary:      Effort: Pulmonary effort is normal.      Breath sounds: No wheezing.      Comments: Coarse breath sounds bilaterally.  Currently on room air, SpO2 93%.  Tachypneic.  Abdominal:      General: Bowel sounds are normal. There is no distension.      Palpations: Abdomen is soft.      Tenderness: There is no abdominal tenderness.   Musculoskeletal:         General: Normal range of motion.      Right lower leg: No edema.      Left lower leg: No edema.   Skin:     General: Skin is warm and dry.   Neurological:      Mental Status: He is alert and oriented to person, place, and time.   Psychiatric:         Behavior: Behavior normal.         Last Recorded Vitals  Blood pressure 108/56, pulse 72, temperature 36 °C (96.8 °F), resp. rate 16, height 1.753 m (5' 9.02\"), weight 81.6 kg (180 lb), SpO2 95%.     Relevant Results            Results for orders placed or performed during the hospital encounter of 05/03/25 (from the past 24 hours)   CBC and Auto Differential   Result Value Ref Range     WBC 10.0 4.4 - 11.3 x10*3/uL     nRBC 0.2 (H) 0.0 - 0.0 /100 WBCs     RBC 2.03 (L) 4.50 - 5.90 x10*6/uL     " Hemoglobin 6.3 (LL) 13.5 - 17.5 g/dL     Hematocrit 20.8 (L) 41.0 - 52.0 %      (H) 80 - 100 fL     MCH 31.0 26.0 - 34.0 pg     MCHC 30.3 (L) 32.0 - 36.0 g/dL     RDW 15.3 (H) 11.5 - 14.5 %     Platelets 241 150 - 450 x10*3/uL     Neutrophils % 78.6 40.0 - 80.0 %     Immature Granulocytes %, Automated 0.7 0.0 - 0.9 %     Lymphocytes % 11.9 13.0 - 44.0 %     Monocytes % 6.2 2.0 - 10.0 %     Eosinophils % 2.2 0.0 - 6.0 %     Basophils % 0.4 0.0 - 2.0 %     Neutrophils Absolute 7.89 (H) 1.60 - 5.50 x10*3/uL     Immature Granulocytes Absolute, Automated 0.07 0.00 - 0.50 x10*3/uL     Lymphocytes Absolute 1.19 0.80 - 3.00 x10*3/uL     Monocytes Absolute 0.62 0.05 - 0.80 x10*3/uL     Eosinophils Absolute 0.22 0.00 - 0.40 x10*3/uL     Basophils Absolute 0.04 0.00 - 0.10 x10*3/uL   Comprehensive metabolic panel   Result Value Ref Range     Glucose 240 (H) 74 - 99 mg/dL     Sodium 139 136 - 145 mmol/L     Potassium 4.3 3.5 - 5.3 mmol/L     Chloride 103 98 - 107 mmol/L     Bicarbonate 28 21 - 32 mmol/L     Anion Gap 12 10 - 20 mmol/L     Urea Nitrogen 55 (H) 6 - 23 mg/dL     Creatinine 1.60 (H) 0.50 - 1.30 mg/dL     eGFR 40 (L) >60 mL/min/1.73m*2     Calcium 9.6 8.6 - 10.3 mg/dL     Albumin 3.6 3.4 - 5.0 g/dL     Alkaline Phosphatase 65 33 - 136 U/L     Total Protein 6.3 (L) 6.4 - 8.2 g/dL     AST 17 9 - 39 U/L     Bilirubin, Total 0.3 0.0 - 1.2 mg/dL     ALT 20 10 - 52 U/L   Protime-INR   Result Value Ref Range     Protime >100.0 (HH) 9.8 - 12.4 seconds     INR       Troponin I, High Sensitivity   Result Value Ref Range     Troponin I, High Sensitivity 13 0 - 20 ng/L   Type and screen   Result Value Ref Range     ABO TYPE AB       Rh TYPE POS       ANTIBODY SCREEN NEG     Magnesium   Result Value Ref Range     Magnesium 1.93 1.60 - 2.40 mg/dL   Verify ABO/Rh Group Test (VERAB)   Result Value Ref Range     ABO TYPE AB       Rh TYPE POS     Prepare RBC: 2 Units   Result Value Ref Range     PRODUCT CODE M9302T63        Unit Number [id number]       Unit ABO A       Unit RH POS       XM INTEP COMP       Dispense Status TR       Blood Expiration Date 5/23/2025 11:59:00 PM EDT       PRODUCT BLOOD TYPE 6200       UNIT VOLUME 350       PRODUCT CODE U4926K87       Unit Number [id number]       Unit ABO A       Unit RH POS       XM INTEP COMP       Dispense Status IS       Blood Expiration Date 5/23/2025 11:59:00 PM EDT       PRODUCT BLOOD TYPE 6200       UNIT VOLUME 350     Sars-CoV-2, Influenza A/B and RSV PCR   Result Value Ref Range     Coronavirus 2019, PCR Not Detected Not Detected     Flu A Result Not Detected Not Detected     Flu B Result Not Detected Not Detected     RSV PCR Not Detected Not Detected   Legionella Antigen, Urine     Specimen: Clean Catch/Voided; Urine   Result Value Ref Range     L. pneumophila Urine Ag Negative Negative   Streptococcus pneumoniae Antigen, Urine     Specimen: Clean Catch/Voided; Urine   Result Value Ref Range     Streptococcus pneumoniae Ag, Urine Negative Negative   POCT GLUCOSE   Result Value Ref Range     POCT Glucose 279 (H) 74 - 99 mg/dL   POCT GLUCOSE   Result Value Ref Range     POCT Glucose 227 (H) 74 - 99 mg/dL   Hemoglobin and hematocrit, blood   Result Value Ref Range     Hemoglobin 8.8 (L) 13.5 - 17.5 g/dL     Hematocrit 28.0 (L) 41.0 - 52.0 %   CBC   Result Value Ref Range     WBC 8.2 4.4 - 11.3 x10*3/uL     nRBC 0.4 (H) 0.0 - 0.0 /100 WBCs     RBC 2.89 (L) 4.50 - 5.90 x10*6/uL     Hemoglobin 8.8 (L) 13.5 - 17.5 g/dL     Hematocrit 28.0 (L) 41.0 - 52.0 %     MCV 97 80 - 100 fL     MCH 30.4 26.0 - 34.0 pg     MCHC 31.4 (L) 32.0 - 36.0 g/dL     RDW 16.9 (H) 11.5 - 14.5 %     Platelets 211 150 - 450 x10*3/uL   Basic metabolic panel   Result Value Ref Range     Glucose 243 (H) 74 - 99 mg/dL     Sodium 137 136 - 145 mmol/L     Potassium 3.9 3.5 - 5.3 mmol/L     Chloride 99 98 - 107 mmol/L     Bicarbonate 29 21 - 32 mmol/L     Anion Gap 13 10 - 20 mmol/L     Urea Nitrogen 56 (H) 6  - 23 mg/dL     Creatinine 1.67 (H) 0.50 - 1.30 mg/dL     eGFR 38 (L) >60 mL/min/1.73m*2     Calcium 9.1 8.6 - 10.3 mg/dL   Protime-INR   Result Value Ref Range     Protime 16.5 (H) 9.8 - 12.4 seconds     INR 1.5 (H) 0.9 - 1.1   POCT GLUCOSE   Result Value Ref Range     POCT Glucose 227 (H) 74 - 99 mg/dL      XR chest 1 view  Result Date: 5/3/2025  Interpreted By:  Cleve Cardenas, STUDY: XR CHEST 1 VIEW; 5/3/2025 2:02 pm   INDICATION: Signs/Symptoms:anemia.   COMPARISON: Chest x-ray 12/07/2024   ACCESSION NUMBER(S): [id number]   ORDERING CLINICIAN: SUMAN HENRIQUEZ   TECHNIQUE: 1 view of the chest was performed.   FINDINGS: Indeterminate bilateral lower lobe nodular infiltrates could be infectious. No pleural effusion. No pneumothorax.  The cardiomediastinal silhouette is within normal limits. Bilateral shoulder joint degenerative changes.        1. New scattered bilateral lower lobe predominant nodular infiltrates could be infectious. Finding could be further assessed by dedicated chest CT scan.   Signed by: Cleve Cardenas 5/3/2025 2:11 PM Dictation workstation:   [id number]        Assessment & Plan  Anemia, unspecified type     Supratherapeutic INR     Pneumonia     Productive cough     Shortness of breath     Generalized weakness        Saul Asif is a 89 y.o. male presents to the ED from facility for evaluation of low hemoglobin level.  Hemoglobin level 6.3 this morning on lab work at facility.  Patient reports extreme fatigue over the past 2 days.  Also notes dark stools however denies noticing any blood in his stools.  Denies any blood in his urine or coughing up any blood.  Has had blood transfusions in the past.  Reports some generalized weakness over the past couple days and generally not feeling very well.  Patient's family also notes shortness of breath and productive cough over the past week, granddaughter stating his breathing sounded horrible last week and when she was visiting him.  States the facility  did a chest x-ray at that time which was unremarkable.      CODE STATUS: DNR, DNI     #Acute on chronic anemia  #Supratherapeutic INR  #Atrial fibrillation on Coumadin  #Generalized weakness/fatigue  Admit for observation with telemetry monitoring  -Currently taking 2.5 mg Coumadin daily for history of atrial fibrillation.  Patient does report dark stools.  PT level greater than 100, unable to calculate INR level.  2 units RBCs ordered in the ED  Repeat H&H ordered after transfusion, continue to trend, transfuse as needed for hemoglobin less than 7  5 mg IV vitamin K ordered  Stool occult blood ordered  40 mg IV Protonix daily  Monitor daily PT/INR level  Q 4 vitals  CBC, BMP in the a.m.  Cardiac/diabetic/renal diet  Fall precautions, PT/OT for evaluation and treatment  Social work consult for discharge planning     #Suspect pneumonia  #Productive cough  #Intermittent shortness of breath, worse over the past week  Chest x-ray shows new scattered bilateral lower lobe predominant nodular infiltrates  Patient afebrile, no leukocytosis.  Urine pneumonia antigens ordered, procalcitonin ordered  IV Rocephin and IV azithromycin ordered  Cough medication, breathing treatments, oxygen as needed  COVID, flu, RSV ordered     #Diabetes mellitus  Hold home oral antidiabetic medications  Sliding scale insulin  ACHS Accu-Cheks  Tresiba 8 units daily     Continue home medications as appropriate     Chronic conditions:  A-fib, CKD, HLD, hypothyroidism, prostate cancer, aortic valve stenosis, type II DM     #DVT prophylaxis  Hold home oral anticoagulation  SCDs, ambulation as tolerated           had concerns including Weakness, Gen (Pt BIBA with c/o generalized weakness and fatigue x2 days. Pt had labs drawn at [facility] which revealed a Hgb of 6.3, pt endorses dark stools. Alert and Solomon. Pt is also on coumadin. Denies CP or SOB. ).        Problem List Items Addressed This Visit         Pneumonia     * (Principal) Anemia,  unspecified type - Primary      Other Visit Diagnoses           On warfarin for atrial fibrillation (Multi)           Dark stools           Elevated protime                    HPI         Weakness, Gen     Additional comments: Pt BIBA with c/o generalized weakness and fatigue x2 days. Pt had labs drawn at [facility] which revealed a Hgb of 6.3, pt endorses dark stools. Alert and [place]. Pt is also on coumadin. Denies CP or SOB.             Last edited by Deepthi Olivia RN on 5/3/2025  1:40 PM.             Problem List as of 5/4/2025 Reviewed: 2/27/2025 10:45 AM by Matthew Tubbs MD        Abdominal bloating     Abdominal pain     Abnormal chest xray     Acquired hypothyroidism     Asymptomatic cholelithiasis     Atrial fibrillation (Multi)     Kraus's esophagus     Diabetes mellitus (Multi)     Gastric ulcer     Helicobacter pylori (H. pylori) infection     HH (hiatus hernia)     History of colon polyps     Hyperlipidemia     Iron deficiency anemia     Irritable bowel syndrome     Leg joint pain     Severe aortic stenosis     Last Assessment & Plan 2/27/2025 Office Visit Written 2/27/2025 10:49 AM by Matthew Tubbs MD   2/27/25 echocqrdiogram svere aortic stenosis and moderate AI LVEF = 65-70%, severe MAC with moderate MR,  Tr with midly elevated RVSP (reviewed today)           Muscular pain     Personal history of Helicobacter infection     Pneumonia     Presence of artificial knee joint     Right hip pain     Tubular adenoma     Stage 4 chronic kidney disease (Multi)     Paroxysmal atrial fibrillation (Multi)     Prostate cancer (Multi)     Squamous cell cancer of skin of left cheek     Secondary hyperparathyroidism of renal origin (Multi)     RBBB     * (Principal) Anemia, unspecified type     Supratherapeutic INR     Productive cough     Shortness of breath     Generalized weakness                 Active Ambulatory Problems     Diagnosis Date Noted    Abdominal bloating 10/12/2023    Abdominal pain  10/12/2023    Abnormal chest xray 10/12/2023    Acquired hypothyroidism 10/12/2023    Asymptomatic cholelithiasis 10/12/2023    Atrial fibrillation (Multi) 10/12/2023    Kraus's esophagus 10/12/2023    Diabetes mellitus (Multi) 10/12/2023    Gastric ulcer 10/12/2023    Helicobacter pylori (H. pylori) infection 10/12/2023    HH (hiatus hernia) 10/12/2023    History of colon polyps 10/12/2023    Hyperlipidemia 10/12/2023    Iron deficiency anemia 10/12/2023    Irritable bowel syndrome 10/12/2023    Leg joint pain 10/12/2023    Severe aortic stenosis 10/12/2023    Muscular pain 10/12/2023    Personal history of Helicobacter infection 10/12/2023    Pneumonia 10/12/2023    Presence of artificial knee joint 10/12/2023    Right hip pain 10/12/2023    Tubular adenoma 10/12/2023    Stage 4 chronic kidney disease (Multi) 10/18/2023    Paroxysmal atrial fibrillation (Multi) 02/15/2024    Prostate cancer (Multi) 11/09/2017    Squamous cell cancer of skin of left cheek 11/13/2017    Secondary hyperparathyroidism of renal origin (Multi) 04/23/2024    RBBB 02/27/2025              Resolved Ambulatory Problems     Diagnosis Date Noted    Obesity, Class I, BMI 30.0-34.9 (see actual BMI) 10/12/2023    COVID-19 12/27/2024    COVID 12/28/2024      No Additional Past Medical History                       Active Orders   Microbiology     Occult Blood, Stool       Frequency: Once       Number of Occurrences: 1 Occurrences   Lab     CBC       Frequency: AM draw       Number of Occurrences: 1 Occurrences     Comprehensive Metabolic Panel       Frequency: AM draw       Number of Occurrences: 1 Occurrences     Protime-INR       Frequency: AM draw       Number of Occurrences: 3 Days   Diet     Adult diet Cardiac, Renal, Consistent Carb; CCD 75 gm/meal; 70 gm fat; 2 - 3 grams Sodium; Potassium Restricted 2 gm (50mEq)       Frequency: Effective now       Number of Occurrences: Until Specified   Nursing     Activity (specify) Ambulate; Ambulate  with Assistance; As Tolerated       Frequency: Until discontinued       Number of Occurrences: Until Specified     Apply sequential compression device       Frequency: Until discontinued       Number of Occurrences: Until Specified     Check pulse oximetry       Frequency: q8h       Number of Occurrences: Until Specified     Glucose 10-70 mg/dL & CONSCIOUS- Give 15 Grams of Carbohydrates and repeat until blood glucose level reaches 100 mg/dL or greater.       Frequency: Until discontinued       Number of Occurrences: Until Specified       Order Comments: Select 1 of the following:  -1 cup skim milk  -3 or 4 glucose tablets  -4 ounces of fruit juice or regular soda.  Patient MUST be CONSCIOUS and able to eat or drink        Notify provider       Frequency: Until discontinued       Number of Occurrences: Until Specified     Notify provider (specify parameters)       Frequency: Until discontinued       Number of Occurrences: Until Specified       Order Comments: [place] Hypoglycemia interventions.        Notify provider (specify parameters)       Frequency: Until discontinued       Number of Occurrences: Until Specified       Order Comments: For blood glucose greater than 200 mg/dL twice over 24 hours.  Provider to consider Endocrine Consult.        Notify provider (specify parameters)       Frequency: Until discontinued       Number of Occurrences: Until Specified     Pain Assessment       Frequency: q4h       Number of Occurrences: Until Specified     Pulse Oximetry       Frequency: Until discontinued       Number of Occurrences: Until Specified     Vital Signs       Frequency: Per unit standards       Number of Occurrences: Until Specified     Vital Signs       Frequency: q4h       Number of Occurrences: Until Specified     Vital signs for transfusion       Frequency: Per unit standards       Number of Occurrences: Until Specified       Order Comments: Document required Vital Signs: Immediately prior to  transfusion, within the first 15 minutes of transfusion, 45-60 minutes after infusion starts, and within 1 hour of completion of transfusion.        Vital signs for transfusion       Frequency: Per unit standards       Number of Occurrences: Until Specified       Order Comments: Document required Vital Signs: Immediately prior to transfusion, within the first 15 minutes of transfusion, 45-60 minutes after infusion starts, and within 1 hour of completion of transfusion.        Weight on admission       Frequency: Daily       Number of Occurrences: Until Specified   Code Status     DNR and No Intubation       Frequency: Continuous       Number of Occurrences: Until Specified   Consult     Inpatient consult to Social Work and TCC       Frequency: Once       Number of Occurrences: 1 Occurrences   Nourishments     May Participate in Room Service       Frequency: Once       Number of Occurrences: 1 Occurrences   OT     OT eval and treat       Frequency: Until therapy completed       Number of Occurrences: 1 Occurrences   PT     PT eval and treat       Frequency: Until therapy completed       Number of Occurrences: 1 Occurrences   Respiratory Care     Airway Clearance Techniques Device/modality: EzPap       Frequency: TID       Number of Occurrences: Until Specified     Respiratory care eval and treat       Frequency: Once       Number of Occurrences: 1 Occurrences       Order Comments: Triage 5  Due 5/6 0700      ECG     ECG 12 lead       Frequency: Once       Number of Occurrences: 1 Occurrences     Electrocardiogram, 12-lead PRN ACS symptoms       Frequency: PRN       Number of Occurrences: Until Specified       Order Comments: Notify provider if performed.      Precaution     Fall precautions       Frequency: Until discontinued       Number of Occurrences: Until Specified   Point of Care Testing - Docked Device     POCT Glucose       Frequency: 4x daily - AC and at bedtime       Number of Occurrences: 3 Days        Order Comments: And PRN           POCT Glucose       Frequency: PRN       Number of Occurrences: Until Specified       Order Comments: PRN for hypoglycemia interventions instituted.  Retest blood glucose level every 15 minutes after every hypoglycemic intervention until blood glucose is greater than 100 mg/dL.         Telemetry     Telemetry monitoring - Floor only       Frequency: Until discontinued       Number of Occurrences: 3 Days   Medications     acetaminophen (Tylenol) oral liquid 650 mg       Linked Order: Or       Frequency: q4h PRN       Dose: 650 mg       Route: oral     acetaminophen (Tylenol) suppository 650 mg       Linked Order: Or       Frequency: q4h PRN       Dose: 650 mg       Route: rectal     acetaminophen (Tylenol) tablet 650 mg       Linked Order: Or       Frequency: q4h PRN       Dose: 650 mg       Route: oral     atorvastatin (Lipitor) tablet 10 mg       Frequency: Nightly       Dose: 10 mg       Route: oral     azithromycin (Zithromax) 500 mg in dextrose 5% 250 mL IV       Frequency: q24h       Dose: 500 mg       Route: intravenous     brimonidine (AlphaGAN) 0.2 % ophthalmic solution 1 drop       Frequency: BID       Dose: 1 drop       Route: Both Eyes     cefTRIAXone (Rocephin) 2 g in dextrose (iso) IV 50 mL       Frequency: q24h       Dose: 2 g       Route: intravenous     cholecalciferol (Vitamin D-3) tablet 25 mcg       Frequency: Daily       Dose: 25 mcg       Route: oral     dextrose 50 % injection 12.5 g       Frequency: q15 min PRN       Dose: 12.5 g       Route: intravenous     dextrose 50 % injection 25 g       Frequency: q15 min PRN       Dose: 25 g       Route: intravenous     docusate sodium (Colace) capsule 100 mg       Frequency: Nightly       Dose: 100 mg       Route: oral     ferrous sulfate tablet 325 mg       Frequency: Nightly       Dose: 1 tablet       Route: oral     glucagon (Glucagen) injection 1 mg       Frequency: q15 min PRN       Dose: 1 mg       Route:  intramuscular     glucagon (Glucagen) injection 1 mg       Frequency: q15 min PRN       Dose: 1 mg       Route: intramuscular     guaiFENesin (Mucinex) 12 hr tablet 600 mg       Frequency: BID       Dose: 600 mg       Route: oral     insulin glargine (Lantus) injection 10 Units       Frequency: Nightly       Dose: 10 Units       Route: subcutaneous     insulin lispro injection 0-10 Units       Frequency: TID AC       Dose: 0-10 Units       Route: subcutaneous     ipratropium (Atrovent) 0.02 % nebulizer solution 0.5 mg       Frequency: TID       Dose: 0.5 mg       Route: nebulization     ipratropium-albuteroL (Duo-Neb) 0.5-2.5 mg/3 mL nebulizer solution 3 mL       Frequency: q2h PRN       Dose: 3 mL       Route: nebulization     levothyroxine (Synthroid, Levoxyl) tablet 100 mcg       Frequency: Daily       Dose: 100 mcg       Route: oral     melatonin tablet 5 mg       Frequency: Nightly PRN       Dose: 5 mg       Route: oral     oxygen (O2) therapy       Frequency: Continuous PRN - O2/gases       Route: inhalation     pantoprazole (Protonix) injection 40 mg       Frequency: Daily       Dose: 40 mg       Route: intravenous     torsemide (Demadex) tablet 20 mg       Frequency: Daily       Dose: 20 mg       Route: oral     traZODone (Desyrel) tablet 25 mg       Frequency: Nightly       Dose: 25 mg       Route: oral     warfarin (Coumadin) tablet 2.5 mg       Frequency: Daily       Dose: 2.5 mg       Route: oral       Order Comments: Ensure proper admin timing per warfarin policy.      Dietary Orders (From admission, onward)                  Start     Ordered     05/03/25 1658   May Participate in Room Service  ( ROOM SERVICE MAY PARTICIPATE)  Once        Question:  .  Answer:  Yes    05/03/25 1657     05/03/25 1645   Adult diet Cardiac, Renal, Consistent Carb; CCD 75 gm/meal; 70 gm fat; 2 - 3 grams Sodium; Potassium Restricted 2 gm (50mEq)  Diet effective now        Question Answer Comment   Diet type Cardiac    "  Diet type Renal     Diet type Consistent Carb     Carb diet selection: CCD 75 gm/meal     Fat restriction: 70 gm fat     Sodium restriction: 2 - 3 grams Sodium     Potassium restriction: Potassium Restricted 2 gm (50mEq)         05/03/25 1655                       89 yrs@  ADMITDTTM@  Dark stools [R19.5]  Elevated protime [R79.1]  Anemia, unspecified type [D64.9]  On warfarin for atrial fibrillation (Multi) [I48.91, Z79.01]  [unfilled]  Weight: 81.6 kg (180 lb)     Vitals               Vitals:     05/02/25 2100 05/03/25 1330 05/03/25 1341 05/03/25 1345   BP: 118/85 111/72 111/72     Pulse: 82 86 88 87   Resp: 16   18 (!) 25   Temp: 36.5 °C (97.7 °F)   36.4 °C (97.5 °F)     TempSrc: Temporal   Temporal     SpO2: 97% 94% (!) 92% 96%   Weight:     81.6 kg (180 lb)     Height:     1.753 m (5' 9\")       05/03/25 1400 05/03/25 1415 05/03/25 1430 05/03/25 1445   BP:     133/58     Pulse: 88 84 79 80   Resp: (!) 22 20 19 19   Temp:           TempSrc:           SpO2: 96% 98% 95% 95%   Weight:           Height:             05/03/25 1500 05/03/25 1515 05/03/25 1530 05/03/25 1545   BP: 115/57         Pulse: 81 87 88 85   Resp: 20 (!) 24 (!) 23 (!) 25   Temp:           TempSrc:           SpO2: 97% 97% 97% 96%   Weight:           Height:             05/03/25 1600 05/03/25 1642 05/03/25 1645 05/03/25 1826   BP:   147/67 147/67 125/60   Pulse: 92 88 88 77   Resp: (!) 33 16   18   Temp:   36.4 °C (97.5 °F) 36.4 °C (97.5 °F) 36.3 °C (97.3 °F)   TempSrc:   Temporal   Temporal   SpO2: 96%   95% 94%   Weight:   81.6 kg (180 lb)       Height:   1.753 m (5' 9.02\")         05/03/25 1841 05/03/25 1926 05/03/25 2041 05/04/25 0042   BP: 126/60 118/85 118/85 122/58   Pulse: 98  Comment: sitting upright for dinner 82 82 71   Resp: 18 16 16 16   Temp: 36.3 °C (97.3 °F) 36.5 °C (97.7 °F) 36.5 °C (97.7 °F) 35.8 °C (96.4 °F)   TempSrc: Temporal Temporal Temporal Temporal   SpO2: 94% 97% 97% 93%   Weight:           Height:             05/04/25 " 0056 05/04/25 0142 05/04/25 0345 05/04/25 0402   BP: (!) 111/6 109/59 124/60 124/60   Pulse: 73 78 72 72   Resp: 18 18 16 16   Temp: 36.3 °C (97.3 °F) 35.5 °C (95.9 °F) 36 °C (96.8 °F) 36 °C (96.8 °F)   TempSrc: Temporal Temporal Temporal Temporal   SpO2: 97% 96% 93% 93%   Weight:           Height:             05/04/25 0637 05/04/25 1058 05/04/25 1155   BP: 131/63 108/56     Pulse: 72 72     Resp: 16       Temp: 36.2 °C (97.2 °F) 36 °C (96.8 °F)     TempSrc: Temporal       SpO2: 95% 94% 95%   Weight:         Height:                        Chief Complaint    Weakness, Gen            Patient seen resting in bed with head of bed elevated, no s/s or c/o acute difficulties at this time.  Vital signs for last 24 hours Temp:  [35.5 °C (95.9 °F)-36.5 °C (97.7 °F)] 36 °C (96.8 °F)  Heart Rate:  [71-98] 72  Resp:  [16-33] 16  BP: (108-147)/(6-85) 108/56    No intake/output data recorded.  Patient still requiring frequent cardiac and SPO2 monitoring. Continue aggressive pulmonary hygiene and oral hygiene. Off loading as tolerated for skin integrity. Medications and labs reviewed-    Patient recently received an antibiotic (last 12 hours)         None                        Results for orders placed or performed during the hospital encounter of 05/03/25 (from the past 96 hours)   CBC and Auto Differential   Result Value Ref Range     WBC 10.0 4.4 - 11.3 x10*3/uL     nRBC 0.2 (H) 0.0 - 0.0 /100 WBCs     RBC 2.03 (L) 4.50 - 5.90 x10*6/uL     Hemoglobin 6.3 (LL) 13.5 - 17.5 g/dL     Hematocrit 20.8 (L) 41.0 - 52.0 %      (H) 80 - 100 fL     MCH 31.0 26.0 - 34.0 pg     MCHC 30.3 (L) 32.0 - 36.0 g/dL     RDW 15.3 (H) 11.5 - 14.5 %     Platelets 241 150 - 450 x10*3/uL     Neutrophils % 78.6 40.0 - 80.0 %     Immature Granulocytes %, Automated 0.7 0.0 - 0.9 %     Lymphocytes % 11.9 13.0 - 44.0 %     Monocytes % 6.2 2.0 - 10.0 %     Eosinophils % 2.2 0.0 - 6.0 %     Basophils % 0.4 0.0 - 2.0 %     Neutrophils Absolute 7.89 (H)  1.60 - 5.50 x10*3/uL     Immature Granulocytes Absolute, Automated 0.07 0.00 - 0.50 x10*3/uL     Lymphocytes Absolute 1.19 0.80 - 3.00 x10*3/uL     Monocytes Absolute 0.62 0.05 - 0.80 x10*3/uL     Eosinophils Absolute 0.22 0.00 - 0.40 x10*3/uL     Basophils Absolute 0.04 0.00 - 0.10 x10*3/uL   Comprehensive metabolic panel   Result Value Ref Range     Glucose 240 (H) 74 - 99 mg/dL     Sodium 139 136 - 145 mmol/L     Potassium 4.3 3.5 - 5.3 mmol/L     Chloride 103 98 - 107 mmol/L     Bicarbonate 28 21 - 32 mmol/L     Anion Gap 12 10 - 20 mmol/L     Urea Nitrogen 55 (H) 6 - 23 mg/dL     Creatinine 1.60 (H) 0.50 - 1.30 mg/dL     eGFR 40 (L) >60 mL/min/1.73m*2     Calcium 9.6 8.6 - 10.3 mg/dL     Albumin 3.6 3.4 - 5.0 g/dL     Alkaline Phosphatase 65 33 - 136 U/L     Total Protein 6.3 (L) 6.4 - 8.2 g/dL     AST 17 9 - 39 U/L     Bilirubin, Total 0.3 0.0 - 1.2 mg/dL     ALT 20 10 - 52 U/L   Protime-INR   Result Value Ref Range     Protime >100.0 (HH) 9.8 - 12.4 seconds     INR       Troponin I, High Sensitivity   Result Value Ref Range     Troponin I, High Sensitivity 13 0 - 20 ng/L   Type and screen   Result Value Ref Range     ABO TYPE AB       Rh TYPE POS       ANTIBODY SCREEN NEG     Magnesium   Result Value Ref Range     Magnesium 1.93 1.60 - 2.40 mg/dL   Verify ABO/Rh Group Test (VERAB)   Result Value Ref Range     ABO TYPE AB       Rh TYPE POS     Prepare RBC: 2 Units   Result Value Ref Range     PRODUCT CODE K9523N39       Unit Number [id number]       Unit ABO A       Unit RH POS       XM INTEP COMP       Dispense Status TR       Blood Expiration Date 5/23/2025 11:59:00 PM EDT       PRODUCT BLOOD TYPE 6200       UNIT VOLUME 350       PRODUCT CODE N1182Y75       Unit Number [id number]       Unit ABO A       Unit RH POS       XM INTEP COMP       Dispense Status IS       Blood Expiration Date 5/23/2025 11:59:00 PM EDT       PRODUCT BLOOD TYPE 6200       UNIT VOLUME 350     Sars-CoV-2, Influenza A/B and RSV  PCR   Result Value Ref Range     Coronavirus 2019, PCR Not Detected Not Detected     Flu A Result Not Detected Not Detected     Flu B Result Not Detected Not Detected     RSV PCR Not Detected Not Detected   Legionella Antigen, Urine     Specimen: Clean Catch/Voided; Urine   Result Value Ref Range     L. pneumophila Urine Ag Negative Negative   Streptococcus pneumoniae Antigen, Urine     Specimen: Clean Catch/Voided; Urine   Result Value Ref Range     Streptococcus pneumoniae Ag, Urine Negative Negative   POCT GLUCOSE   Result Value Ref Range     POCT Glucose 279 (H) 74 - 99 mg/dL   POCT GLUCOSE   Result Value Ref Range     POCT Glucose 227 (H) 74 - 99 mg/dL   Hemoglobin and hematocrit, blood   Result Value Ref Range     Hemoglobin 8.8 (L) 13.5 - 17.5 g/dL     Hematocrit 28.0 (L) 41.0 - 52.0 %   CBC   Result Value Ref Range     WBC 8.2 4.4 - 11.3 x10*3/uL     nRBC 0.4 (H) 0.0 - 0.0 /100 WBCs     RBC 2.89 (L) 4.50 - 5.90 x10*6/uL     Hemoglobin 8.8 (L) 13.5 - 17.5 g/dL     Hematocrit 28.0 (L) 41.0 - 52.0 %     MCV 97 80 - 100 fL     MCH 30.4 26.0 - 34.0 pg     MCHC 31.4 (L) 32.0 - 36.0 g/dL     RDW 16.9 (H) 11.5 - 14.5 %     Platelets 211 150 - 450 x10*3/uL   Basic metabolic panel   Result Value Ref Range     Glucose 243 (H) 74 - 99 mg/dL     Sodium 137 136 - 145 mmol/L     Potassium 3.9 3.5 - 5.3 mmol/L     Chloride 99 98 - 107 mmol/L     Bicarbonate 29 21 - 32 mmol/L     Anion Gap 13 10 - 20 mmol/L     Urea Nitrogen 56 (H) 6 - 23 mg/dL     Creatinine 1.67 (H) 0.50 - 1.30 mg/dL     eGFR 38 (L) >60 mL/min/1.73m*2     Calcium 9.1 8.6 - 10.3 mg/dL   Protime-INR   Result Value Ref Range     Protime 16.5 (H) 9.8 - 12.4 seconds     INR 1.5 (H) 0.9 - 1.1   POCT GLUCOSE   Result Value Ref Range     POCT Glucose 227 (H) 74 - 99 mg/dL           Patient fully evaluated 05/03, thorough record review performed of previous labs and notes from prior encounters. Plan discussed with interdisciplinary team, consults placed, appreciate  input. Will continue current and repeat labs in the AM.          Discharge planning discussed with patient and care team. Therapy evaluations ordered. Patient aware and agreeable to current plan, continue plan as above.  Pulmonology consultation obtained, appreciate input.     I spent a total of 75 minutes on the date of the service which included preparing to see the patient, face-to-face patient care, completing clinical documentation, obtaining and/or reviewing separately obtained history, performing a medically appropriate examination, counseling and educating the patient/family/caregiver, ordering medications, tests, or procedures, communicating with other HCPs (not separately reported), independently interpreting results (not separately reported), communicating results to the patient/family/caregiver, and care coordination (not separately reported).         Ngozi Lea                  Revision History          Objective  Last Recorded Vitals  /53 (BP Location: Right arm, Patient Position: Sitting)   Pulse 94   Temp 36.5 °C (97.7 °F) (Temporal)   Resp 18   Wt 78.5 kg (173 lb 1 oz)   SpO2 95%   Intake/Output last 3 Shifts:     Intake/Output Summary (Last 24 hours) at 5/8/2025 1510  Last data filed at 5/8/2025 1003      Gross per 24 hour   Intake 50 ml   Output 770 ml   Net -720 ml         Admission Weight  Weight: 81.6 kg (180 lb) (05/03/25 1341)     Daily Weight  05/06/25 : 78.5 kg (173 lb 1 oz)     Image Results  Esophagogastroduodenoscopy (EGD)  Table formatting from the original result was not included.  Impression  Irregular Z-line  Medium type I hiatal hernia with Maximino lesions present  Mild erythematous mucosa in the antrum, consistent with gastritis;   performed cold forceps biopsy to rule out H. pylori  The duodenum appeared normal.     Findings  Irregular Z-line  Medium sliding hiatal hernia (type I hiatal hernia) with Maximino lesions   present, confirmed by retroflexion. Hill grade III  hiatal hernia  Mild erythematous mucosa in the antrum, consistent with gastritis;   performed cold forceps biopsy to rule out H. pylori  The duodenum appeared normal.     Recommendation  Await pathology results   Continue PPI therapy  Proceed with colonoscopy        Indication  Dark stools, Anemia, unspecified type, On warfarin for atrial fibrillation   (Multi)     Post-Op Diagnosis  None     Staff  Staff Role   Cleve Greene MD Proceduralist      Medications  See Anesthesia Record.      Preprocedure  A history and physical has been performed, and patient medication   allergies have been reviewed. The patient's tolerance of previous   anesthesia has been reviewed. The risks and benefits of the procedure and   the sedation options and risks were discussed with the patient. All   questions were answered and informed consent obtained.     Details of the Procedure  The patient underwent monitored anesthesia care, which was administered by   an anesthesia professional. The patient's blood pressure, ECG, ETCO2,   heart rate, level of consciousness, oxygen and respirations were monitored   throughout the procedure. The scope was introduced through the mouth and   advanced to the second part of the duodenum. Retroflexion was performed in   the cardia. Prior to the procedure, the patient's H. Pylori status was   unknown. The patient experienced no blood loss. The procedure was not   difficult. The patient tolerated the procedure well. There were no   apparent adverse events.      Events  Procedure Events   Event Event Time   ENDO SCOPE IN TIME 5/7/2025 12:24 PM   ENDO SCOPE OUT TIME 5/7/2025 12:28 PM   ENDO SCOPE IN TIME 5/7/2025 12:33 PM   ENDO CECUM REACHED 5/7/2025 12:36 PM      Specimens  ID Type Source Tests Collected by Time   1 : COLD BX Tissue STOMACH ANTRUM BIOPSY SURGICAL PATHOLOGY EXAM Cleve Greene MD 5/7/2025 1227      Procedure Location  [facility] 3  [address]  [address]  [phone]     Referring Provider  Marita Moreno, APRN-CNP     Procedure Provider  Cleve Greene MD  Colonoscopy Diagnostic  Table formatting from the original result was not included.  Impression  Examination limited by quality of prep.  6 polyps in the ascending colon, transverse colon and descending colon.   Not removed due to indication of examination and overall health  No etiology for anemia identified. Based on findings of EGD, suspect   anemia may be due to intermittent delilah ulcers secondary to hiatal   hernia.   Diverticulosis in the sigmoid colon     Findings  Six polyps measuring smaller than 5 mm in the ascending colon, transverse   colon and descending colon  Few diverticula in the sigmoid colon        Recommendation  Follow up with primary gastroenterologist   PPI therapy definitely  Resume prior diet  Watch for complications           Indication  Dark stools, Anemia, unspecified type, On warfarin for atrial fibrillation   (Multi)     Post-Op Diagnosis  None     Staff  Staff Role   Cleve Greene MD Proceduralist      Medications  See Anesthesia Record.      Preprocedure  A history and physical has been performed, and patient medication   allergies have been reviewed. The patient's tolerance of previous   anesthesia has been reviewed. The risks and benefits of the procedure and   the sedation options and risks were discussed with the patient. All   questions were answered and informed consent obtained.     Details of the Procedure  The patient underwent monitored anesthesia care, which was administered by   an anesthesia professional. The patient's blood pressure, ECG, ETCO2,   heart rate, level of consciousness, oxygen and respirations were monitored   throughout the procedure. A digital rectal exam was performed. The scope   was introduced through the anus and advanced to the cecum. Retroflexion   was performed in the rectum. The quality of bowel preparation was   evaluated using  the [place] Bowel Preparation Scale with scores of: right   colon = 2, transverse colon = 2, left colon = 1. The total BBPS score was   5. Bowel prep was not adequate. The patient experienced no blood loss. The   procedure was not difficult. The patient tolerated the procedure well.   There were no apparent adverse events.      Events  Procedure Events   Event Event Time   ENDO SCOPE IN TIME 5/7/2025 12:24 PM   ENDO SCOPE OUT TIME 5/7/2025 12:28 PM   ENDO SCOPE IN TIME 5/7/2025 12:33 PM   ENDO CECUM REACHED 5/7/2025 12:36 PM   ENDO SCOPE OUT TIME 5/7/2025 12:46 PM      Specimens  ID Type Source Tests Collected by Time   1 : COLD BX Tissue STOMACH ANTRUM BIOPSY SURGICAL PATHOLOGY EXAM Cleve Greene MD 5/7/2025 1227      Procedure Location  [facility] 3  [address] [facility] [address]  [phone]     Referring Provider  Marita Moreno, APRN-CNP     Procedure Provider  MD Marita Pollard        Physical Exam  Vitals and nursing note reviewed.         General appearance: Not in pain or distress, in no respiratory distress. Weakness noted    HEENT: Atraumatic/normocephalic, EOMI, SABRA, pharynx clear, moist mucosa, redness of the uvula appreciated,   Neck: Supple, no jugular venous distension, lymphadenopathy, thyromegaly or carotid bruits  Chest: Rhonchi  Cardiovascular: Normal S1, S2, regular rate and rhythm, no murmur, rub or gallop  Abdomen: Normal sounds present, soft, lax with no tenderness, no hepatosplenomegaly, and no masses  Extremities: No edema. Pulses are equally present.   Skin: intact, no rashes   Neurologic: Alert and oriented x 3, No focal deficit             Assessment & Plan  Anemia, unspecified type     Supratherapeutic INR     Productive cough     Shortness of breath     Generalized weakness     Patient evaluated May 5. Patient anemic at 8.6. Overall, anemia is improving. 2 units of PRBCs ordered. PT down trending from >100, to 16.5  today. GI consult placed, would appreciate input. Stool occult blood pending. Hyperglycemic at 216. Continue to monitor H/H.      Discharge planning discussed with patient and care team. Therapy evaluations ordered. Patient aware and agreeable to current plan, continue plan as above.     I spent a total of 75 minutes on the date of the service which included preparing to see the patient, face-to-face patient care, completing clinical documentation, obtaining and/or reviewing separately obtained history, performing a medically appropriate examination, counseling and educating the patient/family/caregiver, ordering medications, tests, or procedures, communicating with other HCPs (not separately reported), independently interpreting results (not separately reported), communicating results to the patient/family/caregiver, and care coordination (not separately reported).      Patient fully evaluated  05/06  for    Problem List Items Addressed This Visit                  Hematology and Neoplasia     * (Principal) Anemia, unspecified type - Primary     Relevant Orders     Esophagogastroduodenoscopy (EGD) (Completed)     Colonoscopy Diagnostic (Completed)     Surgical Pathology Exam          Pulmonary and Pneumonias     RESOLVED: Pneumonia     Relevant Orders     Surgical Pathology Exam      Other Visit Diagnoses           On warfarin for atrial fibrillation (Multi)         Relevant Orders     Esophagogastroduodenoscopy (EGD) (Completed)     Colonoscopy Diagnostic (Completed)     Surgical Pathology Exam       Dark stools         Relevant Orders     Esophagogastroduodenoscopy (EGD) (Completed)     Colonoscopy Diagnostic (Completed)     Surgical Pathology Exam       Elevated protime         Relevant Orders     Surgical Pathology Exam             Patient seen resting in bed with head of bed elevated, no s/s or c/o acute difficulties at this time.  Vital signs for last 24 hours Temp:  [36.5 °C (97.7 °F)-36.6 °C (97.9 °F)] 36.5  °C (97.7 °F)  Heart Rate:  [81-95] 94  Resp:  [18-20] 18  BP: (107-121)/(53-60) 108/53    I/O this shift:  In: -   Out: 200 [Urine:200]  Patient still requiring frequent cardiac and SPO2 monitoring. Continue aggressive pulmonary hygiene and oral hygiene. Off loading as tolerated for skin integrity. Medications and labs reviewed-         Results for orders placed or performed during the hospital encounter of 05/03/25 (from the past 24 hours)   POCT GLUCOSE   Result Value Ref Range     POCT Glucose 151 (H) 74 - 99 mg/dL   POCT GLUCOSE   Result Value Ref Range     POCT Glucose 295 (H) 74 - 99 mg/dL   Comprehensive Metabolic Panel   Result Value Ref Range     Glucose 179 (H) 74 - 99 mg/dL     Sodium 134 (L) 136 - 145 mmol/L     Potassium 3.5 3.5 - 5.3 mmol/L     Chloride 96 (L) 98 - 107 mmol/L     Bicarbonate 30 21 - 32 mmol/L     Anion Gap 12 10 - 20 mmol/L     Urea Nitrogen 26 (H) 6 - 23 mg/dL     Creatinine 1.60 (H) 0.50 - 1.30 mg/dL     eGFR 40 (L) >60 mL/min/1.73m*2     Calcium 8.1 (L) 8.6 - 10.3 mg/dL     Albumin 3.3 (L) 3.4 - 5.0 g/dL     Alkaline Phosphatase 65 33 - 136 U/L     Total Protein 5.8 (L) 6.4 - 8.2 g/dL     AST 20 9 - 39 U/L     Bilirubin, Total 0.4 0.0 - 1.2 mg/dL     ALT 16 10 - 52 U/L   CBC and Auto Differential   Result Value Ref Range     WBC 10.6 4.4 - 11.3 x10*3/uL     nRBC 0.0 0.0 - 0.0 /100 WBCs     RBC 2.60 (L) 4.50 - 5.90 x10*6/uL     Hemoglobin 8.0 (L) 13.5 - 17.5 g/dL     Hematocrit 25.5 (L) 41.0 - 52.0 %     MCV 98 80 - 100 fL     MCH 30.8 26.0 - 34.0 pg     MCHC 31.4 (L) 32.0 - 36.0 g/dL     RDW 16.6 (H) 11.5 - 14.5 %     Platelets 220 150 - 450 x10*3/uL     Neutrophils % 80.5 40.0 - 80.0 %     Immature Granulocytes %, Automated 0.6 0.0 - 0.9 %     Lymphocytes % 6.8 13.0 - 44.0 %     Monocytes % 9.3 2.0 - 10.0 %     Eosinophils % 2.3 0.0 - 6.0 %     Basophils % 0.5 0.0 - 2.0 %     Neutrophils Absolute 8.53 (H) 1.60 - 5.50 x10*3/uL     Immature Granulocytes Absolute, Automated 0.06 0.00  - 0.50 x10*3/uL     Lymphocytes Absolute 0.72 (L) 0.80 - 3.00 x10*3/uL     Monocytes Absolute 0.99 (H) 0.05 - 0.80 x10*3/uL     Eosinophils Absolute 0.24 0.00 - 0.40 x10*3/uL     Basophils Absolute 0.05 0.00 - 0.10 x10*3/uL   POCT GLUCOSE   Result Value Ref Range     POCT Glucose 172 (H) 74 - 99 mg/dL   POCT GLUCOSE   Result Value Ref Range     POCT Glucose 307 (H) 74 - 99 mg/dL      Patient recently received an antibiotic (last 12 hours)         None             Plan discussed with interdisciplinary team,  seen  by GI with plan -   Suspect this was exacerbated by his supratherapeutic INR  -Presented with increased fatigue, hemoglobin 6.3  -Continue holding Coumadin, continue IV PPI, monitor Hgb and transfuse as necessary  Appreciate input, will continue current, continue IV antibiotics  Azithromycin, Rocephin, IV protonix, INR @ 1.5, hemoglobin @ 9.3 today. Patient tolerating diet, abdomen soft, nontender, bowel sounds x4, unaware of last BM per patient, on oral iron, will order bowel regimen scheduled and PRN. Up in chair 3 x day, ambulate as tolerated/indicated. Will continue to monitor overnight, monitor for acute bleeding, history of cardiac issues, transfuse to keep hemoglobin >8. Will continue current and repeat labs in the AM.      Discharge planning discussed with patient and care team. Therapy evaluations ordered. Anticipate return to facility SNF at discharge. Patient aware and agreeable to current plan, continue plan as above.     Patient fully evaluated on May 8.  Clinically patient is improved continue to monitor H&H which slightly dropped today.  Recheck labs in a.m. with possible discharge tomorrow     I spent a total of 60 minutes on the date of the service which included preparing to see the patient, face-to-face patient care, completing clinical documentation, obtaining and/or reviewing separately obtained history, performing a medically appropriate examination, counseling and educating the  patient/family/caregiver, ordering medications, tests, or procedures, communicating with other HCPs (not separately reported), independently interpreting results (not separately reported), communicating results to the patient/family/caregiver, and care coordination (not separately reported).                    Pertinent Physical Exam At Time of Discharge  Physical Exam  Vitals and nursing note reviewed.     no acute events overnight, patient denies chest pain, worsening SOB at this time.    Outpatient Follow-Up  Future Appointments   Date Time Provider Department Center   8/28/2025  9:00 AM [facility] 3 ECHO ROOM 1 [id number] [facility] 3300   8/28/2025 10:00 AM Matthew Tubbs MD [id number] [place]         Ngozi Lea

## 2025-05-09 NOTE — PROGRESS NOTES
TCC Note: Pt has a written discharge to return to [facility] today. Met with pt at bedside and informed him that we would be setting up a transport time for pt to return and that I would let his Daughter, Ivelisse know. Called Daughter however, had to leave a voicemail  message informing her of the discharge today and transport back to [facility]. Pt is returning to the [place] portion and thus, [facility] will set up transport time and I will inform the facility. Ruth Snow, VINNY, RN, TCC.    ADDENDUM: Per pt request, left daughter another voicemail. Pt was requesting that she meet him at [facility]. Daughter did call me back and acknowledged pt request. Said she had to stop home first and then will meet pt at facility. Relayed that message to RN who informed pt. Pt getting dressed to leave. Ruth Snow, MSN, RN,TCC.

## 2025-05-09 NOTE — PROGRESS NOTES
Physical Therapy    Physical Therapy Treatment    Patient Name: Saul Asif  MRN: [id number]  Department:   Room: [address] [place] [address]  Today's Date: 5/9/2025  Time Calculation  Start Time: 1155  Stop Time: 1210  Time Calculation (min): 15 min         Assessment/Plan   PT Assessment  End of Session Communication: Bedside nurse  End of Session Patient Position: Up in chair, Alarm on (Seated in recliner,call bell in reach.)     PT Plan  Treatment/Interventions: Bed mobility, Transfer training, Gait training, Balance training, Neuromuscular re-education, Strengthening, Endurance training, Therapeutic exercise, Therapeutic activity  PT Plan: Ongoing PT  PT Frequency: 3 times per week  PT Discharge Recommendations: Low intensity level of continued care  PT Recommended Transfer Status: Stand by assist  PT - OK to Discharge: Yes      General Visit Information:   PT  Visit  PT Received On: 05/09/25  General  Prior to Session Communication: Bedside nurse  Patient Position Received: Bed, 2 rail up, Alarm on  General Comment:  (Pt pleasant and agreeable to participate in PT session.)    Subjective   Precautions:  Precautions  Hearing/Visual Limitations: very [place]  Medical Precautions: Fall precautions            Objective   Pain:  Pain Assessment  Pain Assessment: 0-10  0-10 (Numeric) Pain Score: 0 - No pain              Treatments:  Bed Mobility  Bed Mobility: Yes (Pt performed supine>sit with supervision.)    Ambulation/Gait Training  Ambulation/Gait Training Performed: Yes (Pt able to amb 100'x2 with FWW and CGA/SBA; demos steady faisal with reciprocal gait pattern. Denies dizziness and no LOB noted.)  Transfers  Transfer: Yes (Pt performed sit<>stand with FWW and SBA; v/c for proper hand placement safety.)    Outcome Measures:  [facility] Basic Mobility  Turning from your back to your side while in a flat bed without using bedrails: A little  Moving from lying on your back to sitting on the side of a flat bed without using bedrails: A  little  Moving to and from bed to chair (including a wheelchair): A little  Standing up from a chair using your arms (e.g. wheelchair or bedside chair): A little  To walk in hospital room: A little  Climbing 3-5 steps with railing: A lot  Basic Mobility - Total Score: 17    Education Documentation  Precautions, taught by Abimael Diaz PTA at 5/9/2025 12:26 PM.  Learner: Patient  Readiness: Acceptance  Method: Explanation  Response: Verbalizes Understanding    Mobility Training, taught by Abimael Diaz PTA at 5/9/2025 12:26 PM.  Learner: Patient  Readiness: Acceptance  Method: Explanation  Response: Verbalizes Understanding    Education Comments  No comments found.        OP EDUCATION:       Encounter Problems       Encounter Problems (Active)       PT Problem       STG - Pt will transition supine <> sitting with mod I (Progressing)       Start:  05/04/25    Expected End:  05/18/25            STG - Pt will transfer STS with mod I (Progressing)       Start:  05/04/25    Expected End:  05/18/25            STG - Pt will amb >/=150' using LRAD with mod I (Progressing)       Start:  05/04/25    Expected End:  05/18/25            STG - Pt will maintain G standing balance to decrease risk of falls (Progressing)       Start:  05/04/25    Expected End:  05/18/25               Pain - Adult

## 2025-05-15 LAB
LABORATORY COMMENT REPORT: NORMAL
PATH REPORT.FINAL DX SPEC: NORMAL
PATH REPORT.GROSS SPEC: NORMAL
PATH REPORT.RELEVANT HX SPEC: NORMAL
PATH REPORT.TOTAL CANCER: NORMAL

## 2025-06-05 ENCOUNTER — TELEPHONE (OUTPATIENT)
Dept: GASTROENTEROLOGY | Facility: CLINIC | Age: OVER 89
End: 2025-06-05

## 2025-06-05 NOTE — TELEPHONE ENCOUNTER
Spoke with patient's daughter following a staff message from Dr. Greene. Patient was to be seen for anemia. Patient's daughter decided to wait, since the patient is in a nursing facility, and under constant medical care. Also, patient's PCP is very much involved in the patient's care, so she will call back after she spoke with him.

## 2025-08-07 PROBLEM — M25.50 LEG JOINT PAIN: Status: RESOLVED | Noted: 2023-10-12 | Resolved: 2025-08-07

## 2025-08-07 PROBLEM — R10.9 ABDOMINAL PAIN: Status: RESOLVED | Noted: 2023-10-12 | Resolved: 2025-08-07

## 2025-08-28 ENCOUNTER — OFFICE VISIT (OUTPATIENT)
Dept: CARDIOLOGY | Facility: CLINIC | Age: OVER 89
End: 2025-08-28
Payer: MEDICARE

## 2025-08-28 ENCOUNTER — HOSPITAL ENCOUNTER (OUTPATIENT)
Dept: CARDIOLOGY | Facility: CLINIC | Age: OVER 89
Discharge: HOME | End: 2025-08-28
Payer: MEDICARE

## 2025-08-28 VITALS
WEIGHT: 191 LBS | SYSTOLIC BLOOD PRESSURE: 120 MMHG | BODY MASS INDEX: 28.19 KG/M2 | OXYGEN SATURATION: 95 % | DIASTOLIC BLOOD PRESSURE: 80 MMHG | HEART RATE: 72 BPM

## 2025-08-28 DIAGNOSIS — Z96.659 ARTIFICIAL KNEE JOINT PRESENT, UNSPECIFIED LATERALITY: ICD-10-CM

## 2025-08-28 DIAGNOSIS — I48.91 ATRIAL FIBRILLATION, UNSPECIFIED TYPE (MULTI): ICD-10-CM

## 2025-08-28 DIAGNOSIS — E78.5 HYPERLIPIDEMIA, UNSPECIFIED HYPERLIPIDEMIA TYPE: ICD-10-CM

## 2025-08-28 DIAGNOSIS — I35.0 SEVERE AORTIC STENOSIS: ICD-10-CM

## 2025-08-28 DIAGNOSIS — I48.0 PAROXYSMAL ATRIAL FIBRILLATION (MULTI): ICD-10-CM

## 2025-08-28 DIAGNOSIS — E11.9 TYPE 2 DIABETES MELLITUS WITHOUT COMPLICATION, UNSPECIFIED WHETHER LONG TERM INSULIN USE: ICD-10-CM

## 2025-08-28 DIAGNOSIS — N18.4 STAGE 4 CHRONIC KIDNEY DISEASE (MULTI): ICD-10-CM

## 2025-08-28 DIAGNOSIS — I48.91 UNSPECIFIED ATRIAL FIBRILLATION (MULTI): ICD-10-CM

## 2025-08-28 DIAGNOSIS — I34.0 NONRHEUMATIC MITRAL VALVE REGURGITATION: ICD-10-CM

## 2025-08-28 DIAGNOSIS — D50.0 IRON DEFICIENCY ANEMIA DUE TO CHRONIC BLOOD LOSS: ICD-10-CM

## 2025-08-28 DIAGNOSIS — I45.10 RIGHT BUNDLE BRANCH BLOCK (RBBB): ICD-10-CM

## 2025-08-28 DIAGNOSIS — I45.10 RBBB: ICD-10-CM

## 2025-08-28 DIAGNOSIS — D64.9 ANEMIA, UNSPECIFIED TYPE: ICD-10-CM

## 2025-08-28 DIAGNOSIS — I35.0 SEVERE AORTIC STENOSIS: Primary | ICD-10-CM

## 2025-08-28 PROBLEM — E63.9 UNDERNUTRITION: Status: ACTIVE | Noted: 2025-01-23

## 2025-08-28 PROBLEM — M62.81 MUSCLE WEAKNESS: Status: ACTIVE | Noted: 2025-01-17

## 2025-08-28 LAB
AORTIC VALVE MEAN GRADIENT: 58 MMHG
AORTIC VALVE PEAK VELOCITY: 5 M/S
AV PEAK GRADIENT: 100 MMHG
AVA (PEAK VEL): 0.56 CM2
AVA (VTI): 0.58 CM2
EJECTION FRACTION APICAL 4 CHAMBER: 68.5
EJECTION FRACTION: 68 %
LEFT ATRIUM VOLUME AREA LENGTH INDEX BSA: 66.6 ML/M2
LEFT VENTRICLE INTERNAL DIMENSION DIASTOLE: 4.63 CM (ref 3.5–6)
LEFT VENTRICULAR OUTFLOW TRACT DIAMETER: 1.97 CM
Q ONSET: 227 MS
QRS COUNT: 12 BEATS
QRS DURATION: 148 MS
QT INTERVAL: 448 MS
QTC CALCULATION(BAZETT): 490 MS
QTC FREDERICIA: 476 MS
R AXIS: 98 DEGREES
RIGHT VENTRICLE FREE WALL PEAK S': 0.1 CM/S
RIGHT VENTRICLE PEAK SYSTOLIC PRESSURE: 36 MMHG
T AXIS: 8 DEGREES
T OFFSET: 451 MS
TRICUSPID ANNULAR PLANE SYSTOLIC EXCURSION: 1.8 CM
VENTRICULAR RATE: 72 BPM

## 2025-08-28 PROCEDURE — 1036F TOBACCO NON-USER: CPT | Performed by: INTERNAL MEDICINE

## 2025-08-28 PROCEDURE — 1159F MED LIST DOCD IN RCRD: CPT | Performed by: INTERNAL MEDICINE

## 2025-08-28 PROCEDURE — 93306 TTE W/DOPPLER COMPLETE: CPT

## 2025-08-28 PROCEDURE — 93010 ELECTROCARDIOGRAM REPORT: CPT | Performed by: INTERNAL MEDICINE

## 2025-08-28 PROCEDURE — 1160F RVW MEDS BY RX/DR IN RCRD: CPT | Performed by: INTERNAL MEDICINE

## 2025-08-28 PROCEDURE — 99215 OFFICE O/P EST HI 40 MIN: CPT | Performed by: INTERNAL MEDICINE

## 2025-08-28 PROCEDURE — 93005 ELECTROCARDIOGRAM TRACING: CPT | Performed by: INTERNAL MEDICINE

## 2025-08-28 PROCEDURE — 93306 TTE W/DOPPLER COMPLETE: CPT | Performed by: INTERNAL MEDICINE

## 2025-08-28 PROCEDURE — 99212 OFFICE O/P EST SF 10 MIN: CPT | Mod: 25

## 2025-08-28 RX ORDER — ATORVASTATIN CALCIUM 10 MG/1
10 TABLET, FILM COATED ORAL NIGHTLY
Qty: 90 TABLET | Refills: 3 | Status: SHIPPED | OUTPATIENT
Start: 2025-08-28 | End: 2026-08-28